# Patient Record
Sex: MALE | Race: WHITE | NOT HISPANIC OR LATINO | Employment: OTHER | URBAN - METROPOLITAN AREA
[De-identification: names, ages, dates, MRNs, and addresses within clinical notes are randomized per-mention and may not be internally consistent; named-entity substitution may affect disease eponyms.]

---

## 2017-02-14 DIAGNOSIS — E11.9 TYPE 2 DIABETES MELLITUS WITHOUT COMPLICATION, WITHOUT LONG-TERM CURRENT USE OF INSULIN (HCC): ICD-10-CM

## 2017-02-14 RX ORDER — LANCETS
EACH MISCELLANEOUS
Qty: 100 EACH | Refills: 4 | Status: SHIPPED | OUTPATIENT
Start: 2017-02-14 | End: 2018-03-24 | Stop reason: SDUPTHER

## 2017-04-03 ENCOUNTER — RESULTS ENCOUNTER (OUTPATIENT)
Dept: FAMILY MEDICINE CLINIC | Facility: CLINIC | Age: 77
End: 2017-04-03

## 2017-04-03 DIAGNOSIS — E11.9 TYPE 2 DIABETES MELLITUS WITHOUT COMPLICATION, WITHOUT LONG-TERM CURRENT USE OF INSULIN (HCC): ICD-10-CM

## 2017-04-12 LAB
ALBUMIN SERPL-MCNC: 4.3 G/DL (ref 3.5–5.2)
ALBUMIN/GLOB SERPL: 1.3 G/DL
ALP SERPL-CCNC: 137 U/L (ref 39–117)
ALT SERPL-CCNC: 24 U/L (ref 1–41)
AST SERPL-CCNC: 26 U/L (ref 1–40)
BILIRUB SERPL-MCNC: 0.5 MG/DL (ref 0.1–1.2)
BUN SERPL-MCNC: 20 MG/DL (ref 8–23)
BUN/CREAT SERPL: 19.4 (ref 7–25)
CALCIUM SERPL-MCNC: 9.7 MG/DL (ref 8.6–10.5)
CHLORIDE SERPL-SCNC: 103 MMOL/L (ref 98–107)
CHOLEST SERPL-MCNC: 191 MG/DL (ref 0–200)
CO2 SERPL-SCNC: 27.2 MMOL/L (ref 22–29)
CREAT SERPL-MCNC: 1.03 MG/DL (ref 0.76–1.27)
ERYTHROCYTE [DISTWIDTH] IN BLOOD BY AUTOMATED COUNT: 14 % (ref 11.5–14.5)
GLOBULIN SER CALC-MCNC: 3.4 GM/DL
GLUCOSE SERPL-MCNC: 121 MG/DL (ref 65–99)
HBA1C MFR BLD: 7.63 % (ref 4.8–5.6)
HCT VFR BLD AUTO: 48.4 % (ref 40.4–52.2)
HDLC SERPL-MCNC: 44 MG/DL (ref 40–60)
HGB BLD-MCNC: 15.9 G/DL (ref 13.7–17.6)
LDLC SERPL CALC-MCNC: 129 MG/DL (ref 0–100)
MCH RBC QN AUTO: 29.9 PG (ref 27–32.7)
MCHC RBC AUTO-ENTMCNC: 32.9 G/DL (ref 32.6–36.4)
MCV RBC AUTO: 91.1 FL (ref 79.8–96.2)
PLATELET # BLD AUTO: 204 10*3/MM3 (ref 140–500)
POTASSIUM SERPL-SCNC: 4.4 MMOL/L (ref 3.5–5.2)
PROT SERPL-MCNC: 7.7 G/DL (ref 6–8.5)
RBC # BLD AUTO: 5.31 10*6/MM3 (ref 4.6–6)
SODIUM SERPL-SCNC: 143 MMOL/L (ref 136–145)
TRIGL SERPL-MCNC: 91 MG/DL (ref 0–150)
VLDLC SERPL CALC-MCNC: 18.2 MG/DL (ref 5–40)
WBC # BLD AUTO: 6.4 10*3/MM3 (ref 4.5–10.7)

## 2017-04-19 ENCOUNTER — OFFICE VISIT (OUTPATIENT)
Dept: FAMILY MEDICINE CLINIC | Facility: CLINIC | Age: 77
End: 2017-04-19

## 2017-04-19 VITALS
SYSTOLIC BLOOD PRESSURE: 132 MMHG | HEART RATE: 63 BPM | OXYGEN SATURATION: 95 % | HEIGHT: 70 IN | TEMPERATURE: 97.9 F | WEIGHT: 173.3 LBS | DIASTOLIC BLOOD PRESSURE: 78 MMHG | BODY MASS INDEX: 24.81 KG/M2

## 2017-04-19 DIAGNOSIS — L57.0 ACTINIC KERATOSIS: ICD-10-CM

## 2017-04-19 DIAGNOSIS — E78.5 DYSLIPIDEMIA: ICD-10-CM

## 2017-04-19 DIAGNOSIS — Z79.4 TYPE 2 DIABETES MELLITUS WITHOUT COMPLICATION, WITH LONG-TERM CURRENT USE OF INSULIN (HCC): Primary | ICD-10-CM

## 2017-04-19 DIAGNOSIS — E11.9 TYPE 2 DIABETES MELLITUS WITHOUT COMPLICATION, WITH LONG-TERM CURRENT USE OF INSULIN (HCC): Primary | ICD-10-CM

## 2017-04-19 PROCEDURE — 99214 OFFICE O/P EST MOD 30 MIN: CPT | Performed by: INTERNAL MEDICINE

## 2017-04-19 NOTE — PROGRESS NOTES
Chief Complaint   Patient presents with   • Diabetes     4 month f/u   History of Present Illness   His diabetes developed fairly quickly following pancreatitis in the past. Initially, his sugars were greater than 200. When he presented to me in March, 2014, his sugar was 377. We started multiple oral medications, and his sugars were trending down. He developed acute and severe nausea and vomiting, and was eventually hospitalized with a gastric outlet obstruction.      He underwent partial gastrectomy (and cholecystectomy) in May, 2014. When he was discharged, we kept him on Lantus because of his history of pancreatitis. All of his oral medications were discontinued.      His A1c was up to 8.7% in April, so he changed his diet but did not change his morning Lantus dose.; he still uses 22 Units every night. His A1c dropped to 7.0% by August. He checks his sugars every morning, but not in the evenings. Readings are usually in the 120-130s (no log provided).     He saw Dr. Ramila Stanley for his eye exam lsast year. There was no evidence of retinopathy.     Mr. Greene  reports that he has never smoked. He has never used smokeless tobacco. He reports that he does not drink alcohol or use illicit drugs.        Current Outpatient Prescriptions:   •  aspirin 81 MG chewable tablet, Chew 81 mg daily., Disp: , Rfl:   •  JUVENTINO CONTOUR TEST test strip, USE TO TEST BLOOD SUGAR 3 TIMES A DAY for uncontrolled type 2 diabetes (E11.65)., Disp: 100 each, Rfl: 12  •  BD PEN NEEDLE DELISA U/F 32G X 4 MM misc, USE 1 WITH LANTUS EVERY DAY, Disp: 100 each, Rfl: 4  •  Insulin Degludec 200 UNIT/ML solution pen-injector, Inject 22 Units under the skin Daily., Disp: 1 pen, Rfl: 3  •  MICROLET LANCETS misc, USE 1 TIME A DAY, Disp: 100 each, Rfl: 4  •  omeprazole (PriLOSEC) 20 MG capsule, Take 20 mg by mouth daily., Disp: , Rfl:     ROS: No CP, claudication. No TIA/CVA symptoms. No paresthesias in the hands and feet.  No polyuria/polydipsia.  Sore,  "scaling site over the left eye.    /78 (BP Location: Left arm, Patient Position: Sitting, Cuff Size: Adult)  Pulse 63  Temp 97.9 °F (36.6 °C) (Oral)   Ht 70\" (177.8 cm)  Wt 173 lb 4.8 oz (78.6 kg)  SpO2 95%  BMI 24.87 kg/m2    EXAM  Constitutional: He is oriented to person, place, and time. He appears well-developed and well-nourished.   Eyes: Conjunctivae are normal. No scleral icterus.   Neck: Carotid bruit is not present. No thyroid mass and no thyromegaly present.   Cardiovascular: Normal rate, regular rhythm, normal heart sounds and intact distal pulses.   No pedal edema.   Abdominal: Soft. Bowel sounds are normal. He exhibits no abdominal bruit.   Neurological: He is alert and oriented to person, place, and time. He has normal strength.    Monofilament normal bilaterally.  No pedal lesion and pulses are full bilaterally.  Scaling, erythematous macule over the left eye, neat the eyebrow, c/w and AK.    Bennie was seen today for diabetes.    Diagnoses and all orders for this visit:    Type 2 diabetes mellitus without complication, with long-term current use of insulin    Dyslipidemia    Actinic keratosis  -     Ambulatory Referral to Dermatology    His A1c is up to 7.6 and his LDL is up to 126.  Instead of starting or changing or adding any new medications, he will look at his diet objectively, make some changes, and come back in 3 months so that we can see what his A1c is down to.  We will recheck his cholesterol in the fall.    "

## 2017-07-19 ENCOUNTER — OFFICE VISIT (OUTPATIENT)
Dept: FAMILY MEDICINE CLINIC | Facility: CLINIC | Age: 77
End: 2017-07-19

## 2017-07-19 VITALS — SYSTOLIC BLOOD PRESSURE: 122 MMHG | DIASTOLIC BLOOD PRESSURE: 68 MMHG | HEART RATE: 60 BPM

## 2017-07-19 DIAGNOSIS — Z79.4 TYPE 2 DIABETES MELLITUS WITHOUT COMPLICATION, WITH LONG-TERM CURRENT USE OF INSULIN (HCC): Primary | ICD-10-CM

## 2017-07-19 DIAGNOSIS — E11.9 TYPE 2 DIABETES MELLITUS WITHOUT COMPLICATION, WITH LONG-TERM CURRENT USE OF INSULIN (HCC): Primary | ICD-10-CM

## 2017-07-19 LAB
EXPIRATION DATE: ABNORMAL
HBA1C MFR BLD: 6.7 % (ref 4.8–5.6)
Lab: ABNORMAL

## 2017-07-19 PROCEDURE — 36416 COLLJ CAPILLARY BLOOD SPEC: CPT | Performed by: INTERNAL MEDICINE

## 2017-07-19 PROCEDURE — 99213 OFFICE O/P EST LOW 20 MIN: CPT | Performed by: INTERNAL MEDICINE

## 2017-07-19 PROCEDURE — 83036 HEMOGLOBIN GLYCOSYLATED A1C: CPT | Performed by: INTERNAL MEDICINE

## 2017-07-19 NOTE — PROGRESS NOTES
Chief Complaint   Patient presents with   • Diabetes     Chronic, now insulin-dependent, since a bout of pancreatitis years ago.    A1c was up to 7.63% in April. He has since changed his diet, decreasing his carb intake in the evenings.  He has noticed the difference in his readings.  He denies any hypoglycemic episodes.    No Known Allergies      Current Outpatient Prescriptions:   •  aspirin 81 MG chewable tablet, Chew 81 mg daily., Disp: , Rfl:   •  JUVENTINO CONTOUR TEST test strip, USE TO TEST BLOOD SUGAR 3 TIMES A DAY for uncontrolled type 2 diabetes (E11.65)., Disp: 100 each, Rfl: 12  •  BD PEN NEEDLE DELISA U/F 32G X 4 MM misc, USE 1 WITH LANTUS EVERY DAY, Disp: 100 each, Rfl: 4  •  Insulin Degludec 200 UNIT/ML solution pen-injector, Inject 22 Units under the skin Daily., Disp: 1 pen, Rfl: 3  •  MICROLET LANCETS misc, USE 1 TIME A DAY, Disp: 100 each, Rfl: 4  •  omeprazole (PriLOSEC) 20 MG capsule, Take 20 mg by mouth daily., Disp: , Rfl:     Sister recently  from breast cancer.    He  reports that he has never smoked. He has never used smokeless tobacco. He reports that he does not drink alcohol or use illicit drugs.    ROS: No vision changes. No chest pain. No claudication.  No paresthesias. No angina.      /68 (BP Location: Left arm, Patient Position: Sitting, Cuff Size: Adult)  Pulse 60    EXAM  Well-developed, well-nourished, in no acute distress.  No periorbital edema.  Regular rate and rhythm without murmur.  Normal S1 and S2.  No pedal edema and pedal pulses are full bilaterally.    Bennie was seen today for diabetes.    Diagnoses and all orders for this visit:    Type 2 diabetes mellitus without complication, with long-term current use of insulin  -     Hemoglobin A1c    A1c has improved and is now 6.7%.  He should continue to monitor her sugars, but I have asked him to do.  Testing around the time of dinner at least 1 night a week.  We may wish to move his Lantus to nighttime to improve  control as well.  He should continue with the diet changes that have his A1c down from 7.3.

## 2017-10-03 DIAGNOSIS — Z12.5 SPECIAL SCREENING FOR MALIGNANT NEOPLASM OF PROSTATE: Primary | ICD-10-CM

## 2017-10-03 DIAGNOSIS — Z00.00 ROUTINE GENERAL MEDICAL EXAMINATION AT HEALTH CARE FACILITY: ICD-10-CM

## 2017-10-08 ENCOUNTER — RESULTS ENCOUNTER (OUTPATIENT)
Dept: FAMILY MEDICINE CLINIC | Facility: CLINIC | Age: 77
End: 2017-10-08

## 2017-10-08 DIAGNOSIS — Z00.00 ROUTINE GENERAL MEDICAL EXAMINATION AT HEALTH CARE FACILITY: ICD-10-CM

## 2017-10-17 RX ORDER — PEN NEEDLE, DIABETIC 32GX 5/32"
NEEDLE, DISPOSABLE MISCELLANEOUS
Qty: 100 EACH | Refills: 3 | Status: SHIPPED | OUTPATIENT
Start: 2017-10-17 | End: 2018-09-21 | Stop reason: SDUPTHER

## 2017-10-18 ENCOUNTER — OFFICE VISIT (OUTPATIENT)
Dept: FAMILY MEDICINE CLINIC | Facility: CLINIC | Age: 77
End: 2017-10-18

## 2017-10-18 VITALS
SYSTOLIC BLOOD PRESSURE: 128 MMHG | OXYGEN SATURATION: 98 % | DIASTOLIC BLOOD PRESSURE: 76 MMHG | HEART RATE: 64 BPM | WEIGHT: 173.7 LBS | BODY MASS INDEX: 24.87 KG/M2 | HEIGHT: 70 IN

## 2017-10-18 DIAGNOSIS — Z85.46 PERSONAL HISTORY OF MALIGNANT NEOPLASM OF PROSTATE: ICD-10-CM

## 2017-10-18 DIAGNOSIS — Z79.4 TYPE 2 DIABETES MELLITUS WITHOUT COMPLICATION, WITH LONG-TERM CURRENT USE OF INSULIN (HCC): ICD-10-CM

## 2017-10-18 DIAGNOSIS — Z23 FLU VACCINE NEED: ICD-10-CM

## 2017-10-18 DIAGNOSIS — Z00.00 ROUTINE GENERAL MEDICAL EXAMINATION AT HEALTH CARE FACILITY: Primary | ICD-10-CM

## 2017-10-18 DIAGNOSIS — E11.9 TYPE 2 DIABETES MELLITUS WITHOUT COMPLICATION, WITH LONG-TERM CURRENT USE OF INSULIN (HCC): ICD-10-CM

## 2017-10-18 PROCEDURE — 90662 IIV NO PRSV INCREASED AG IM: CPT | Performed by: INTERNAL MEDICINE

## 2017-10-18 PROCEDURE — G0439 PPPS, SUBSEQ VISIT: HCPCS | Performed by: INTERNAL MEDICINE

## 2017-10-18 PROCEDURE — G0008 ADMIN INFLUENZA VIRUS VAC: HCPCS | Performed by: INTERNAL MEDICINE

## 2017-10-18 NOTE — PATIENT INSTRUCTIONS
Medicare Wellness  Personal Prevention Plan of Service     Date of Office Visit:  10/18/2017  Encounter Provider:  Olvin Correa MD  Place of Service:  [facility] INTERNAL MEDICINE  Patient Name: Bennie Greene  :  1940    As part of the Medicare Wellness portion of your visit today, we are providing you with this personalized preventive plan of services (PPPS). This plan is based upon recommendations of the [place] Preventive Services Task Force (USPSTF) and the Advisory Committee on Immunization Practices (ACIP).    This lists the preventive care services that should be considered, and provides dates of when you are due. Items listed as completed are up-to-date and do not require any further intervention.    Health Maintenance   Topic Date Due   • TDAP/TD VACCINES (1 - Tdap) ?   • PNEUMOCOCCAL VACCINES (65+ LOW/MEDIUM RISK) (1 of 2 - PCV13) ?   • DIABETIC FOOT EXAM  2018   • COLONOSCOPY  10/2024 ?   • ZOSTER VACCINE  ?   • DIABETIC EYE EXAM  Yearly by Dr. Stanley   • URINE MICROALBUMIN  2018   • INFLUENZA VACCINE  2017   • HEMOGLOBIN A1C  2018       Orders Placed This Encounter   Procedures   • Flu Vaccine High Dose PF 65YR+   • Hemoglobin A1c   • Comprehensive Metabolic Panel   • PSA   • Microalbumin / Creatinine Urine Ratio - Urine, Clean Catch       Return in about 3 months (around 2018) for 20 min.    Please have the Health Dept fax a record of your immunizations to our office.

## 2017-10-18 NOTE — PROGRESS NOTES
QUICK REFERENCE INFORMATION:  The ABCs of the Annual Wellness Visit    Subsequent Medicare Wellness Visit    HEALTH RISK ASSESSMENT    1940    Recent Hospitalizations:  No hospitalization(s) within the last year..        Current Medical Providers:  Patient Care Team:  Olvin Correa MD as PCP - General  Olvin Correa MD as PCP - Claims Attributed  Ramila Stanlye MD as Consulting Physician (Ophthalmology)  Shila Killian MD as Surgeon (General Surgery)  Mata Wright MD as Consulting Physician (Dermatology)        Smoking Status:  History   Smoking Status   • Never Smoker   Smokeless Tobacco   • Never Used       Alcohol Consumption:  History   Alcohol Use No       Depression Screen:   PHQ-2/PHQ-9 Depression Screening 10/18/2017   Little interest or pleasure in doing things 0   Feeling down, depressed, or hopeless 0   Total Score 0       Health Habits and Functional and Cognitive Screening:  Functional & Cognitive Status 10/18/2017   Do you have difficulty preparing food and eating? No   Do you have difficulty bathing yourself? No   Do you have difficulty getting dressed? No   Do you have difficulty using the toilet? No   Do you have difficulty moving around from place to place? No   In the past year have you fallen or experienced a near fall? No   Do you need help using the phone?  No   Are you deaf or do you have serious difficulty hearing?  No   Do you need help with transportation? No   Do you need help shopping? No   Do you need help preparing meals?  No   Do you need help with housework?  No   Do you need help with laundry? No   Do you need help taking your medications? No   Do you need help managing money? No       Health Habits  Current Diet: Well Balanced Diet  Dental Exam: Up to date  Eye Exam: Up to date  Exercise (times per week): 4 times per week  Current Exercise Activities Include: Walking      Does the patient have evidence of cognitive impairment? No    Aspirin use  counseling: Taking ASA appropriately as indicated      Recent Lab Results:  CMP:  Lab Results   Component Value Date     (H) 04/12/2017    BUN 20 04/12/2017    CREATININE 1.03 04/12/2017    EGFRIFNONA 70 04/12/2017    EGFRIFAFRI 85 04/12/2017    BCR 19.4 04/12/2017     04/12/2017    K 4.4 04/12/2017    CO2 27.2 04/12/2017    CALCIUM 9.7 04/12/2017    PROTENTOTREF 7.7 04/12/2017    ALBUMIN 4.30 04/12/2017    LABGLOBREF 3.4 04/12/2017    LABIL2 1.3 04/12/2017    BILITOT 0.5 04/12/2017    ALKPHOS 137 (H) 04/12/2017    AST 26 04/12/2017    ALT 24 04/12/2017     Lipid Panel:  Lab Results   Component Value Date    TRIG 91 04/12/2017    HDL 44 04/12/2017    VLDL 18.2 04/12/2017     HbA1c:  Lab Results   Component Value Date    HGBA1C 6.7 (A) 07/19/2017       Visual Acuity:  No exam data present    Age-appropriate Screening Schedule:  Refer to the list below for future screening recommendations based on patient's age, sex and/or medical conditions. Orders for these recommended tests are listed in the plan section. The patient has been provided with a written plan.    Health Maintenance   Topic Date Due   • TDAP/TD VACCINES (1 - Tdap) ?   • PNEUMOCOCCAL VACCINES (65+ LOW/MEDIUM RISK) (1 of 2 - PCV13) ?   • DIABETIC FOOT EXAM  04/06/2018   • COLONOSCOPY  10/2024 ?   • ZOSTER VACCINE  ?   • DIABETIC EYE EXAM  Yearly by Dr. Stanley   • URINE MICROALBUMIN  4/2018   • INFLUENZA VACCINE  08/01/2017   • HEMOGLOBIN A1C  01/19/2018        Subjective   History of Present Illness    Bennie Greene is a 77 y.o. male who presents for an Subsequent Wellness Visit.    The following portions of the patient's history were reviewed and updated as appropriate: allergies, current medications, past family history, past medical history, past social history, past surgical history and problem list.    Outpatient Medications Prior to Visit   Medication Sig Dispense Refill   • aspirin 81 MG chewable tablet Chew 81 mg daily.     • JUVENTINO  "CONTOUR TEST test strip USE TO TEST BLOOD SUGAR 3 TIMES A DAY for uncontrolled type 2 diabetes (E11.65). 100 each 12   • BD PEN NEEDLE DELISA U/F 32G X 4 MM misc USE 1 WITH LANTUS EVERY  each 3   • Insulin Degludec 200 UNIT/ML solution pen-injector Inject 22 Units under the skin Daily. 1 pen 3   • MICROLET LANCETS misc USE 1 TIME A  each 4   • omeprazole (PriLOSEC) 20 MG capsule Take 20 mg by mouth daily.       No facility-administered medications prior to visit.        Patient Active Problem List   Diagnosis   • Type 2 diabetes mellitus without complication       Advance Care Planning:  has NO advance directive - information provided to the patient today    Identification of Risk Factors:  Risk factors include: unhealthy diet and cardiovascular risk.    Review of Systems   Constitutional: Negative for unexpected weight change.   Respiratory: Negative for shortness of breath.    Cardiovascular: Negative for chest pain.   Musculoskeletal: Negative for gait problem.   Neurological: Negative for weakness.       Compared to one year ago, the patient feels his physical health is the same.  Compared to one year ago, the patient feels his mental health is the same.    Objective     Physical Exam  Well-developed, well-nourished, in no acute distress.  Regular rate and rhythm with a 1/6 early systolic murmur.  Mood is upbeat and affect is appropriate.  Alert and oriented ×4 and answers all questions appropriately.  Able to ascend and descend from the exam table fluidly and without assistance.  Station, gait, and coordination are normal.    Vitals:    10/18/17 0851   BP: 128/76   BP Location: Left arm   Patient Position: Sitting   Cuff Size: Adult   Pulse: 64   SpO2: 98%   Weight: 173 lb 11.2 oz (78.8 kg)   Height: 70\" (177.8 cm)       Body mass index is 24.92 kg/(m^2).  Discussed the patient's BMI with him. The BMI is in the acceptable range.    Assessment/Plan   Patient Self-Management and Personalized Health " Advice  The patient has been provided with information about: prevention of cardiac or vascular disease and preventive services including:   · Advance directive, Influenza vaccine, Pneumococcal vaccine , Prostate cancer screening discussed, Zostavax vaccine (Herpes Zoster).    Visit Diagnoses:    ICD-10-CM ICD-9-CM   1. Routine general medical examination at health care facility Z00.00 V70.0   2. Flu vaccine need Z23 V04.81       Orders Placed This Encounter   Procedures   • Flu Vaccine High Dose PF 65YR+       Outpatient Encounter Prescriptions as of 10/18/2017   Medication Sig Dispense Refill   • aspirin 81 MG chewable tablet Chew 81 mg daily.     • JUVENTINO CONTOUR TEST test strip USE TO TEST BLOOD SUGAR 3 TIMES A DAY for uncontrolled type 2 diabetes (E11.65). 100 each 12   • BD PEN NEEDLE DELISA U/F 32G X 4 MM misc USE 1 WITH LANTUS EVERY  each 3   • Insulin Degludec 200 UNIT/ML solution pen-injector Inject 22 Units under the skin Daily. 1 pen 3   • MICROLET LANCETS misc USE 1 TIME A  each 4   • omeprazole (PriLOSEC) 20 MG capsule Take 20 mg by mouth daily.       No facility-administered encounter medications on file as of 10/18/2017.        Reviewed use of high risk medication in the elderly: yes  Reviewed for potential of harmful drug interactions in the elderly: yes    Follow Up:  No Follow-up on file.     An After Visit Summary and PPPS with all of these plans were given to the patient.       We discussed age-appropriate health maintenance issues today.  He will have the health Department forward a copy of his immunizations to us.  However, he denies having had pneumonia vaccines or shingles vaccine.  If this is the case, we will begin immunizing him at his next office visit.

## 2017-10-19 LAB
ALBUMIN SERPL-MCNC: 4.3 G/DL (ref 3.5–5.2)
ALBUMIN/CREAT UR: ABNORMAL MG/G CREAT (ref 0–30)
ALBUMIN/GLOB SERPL: 1.7 G/DL
ALP SERPL-CCNC: 122 U/L (ref 39–117)
ALT SERPL-CCNC: 21 U/L (ref 1–41)
AST SERPL-CCNC: 19 U/L (ref 1–40)
BILIRUB SERPL-MCNC: 0.4 MG/DL (ref 0.1–1.2)
BUN SERPL-MCNC: 23 MG/DL (ref 8–23)
BUN/CREAT SERPL: 17.6 (ref 7–25)
CALCIUM SERPL-MCNC: 9.5 MG/DL (ref 8.6–10.5)
CHLORIDE SERPL-SCNC: 104 MMOL/L (ref 98–107)
CO2 SERPL-SCNC: 29.2 MMOL/L (ref 22–29)
CREAT SERPL-MCNC: 1.31 MG/DL (ref 0.76–1.27)
CREAT UR-MCNC: 142.5 MG/DL
GFR SERPLBLD CREATININE-BSD FMLA CKD-EPI: 53 ML/MIN/1.73
GFR SERPLBLD CREATININE-BSD FMLA CKD-EPI: 64 ML/MIN/1.73
GLOBULIN SER CALC-MCNC: 2.6 GM/DL
GLUCOSE SERPL-MCNC: 164 MG/DL (ref 65–99)
HBA1C MFR BLD: 6.98 % (ref 4.8–5.6)
MICROALBUMIN UR-MCNC: <3 UG/ML
POTASSIUM SERPL-SCNC: 4.2 MMOL/L (ref 3.5–5.2)
PROT SERPL-MCNC: 6.9 G/DL (ref 6–8.5)
PSA SERPL-MCNC: 0.12 NG/ML (ref 0–4)
SODIUM SERPL-SCNC: 144 MMOL/L (ref 136–145)

## 2017-12-27 DIAGNOSIS — E11.9 TYPE 2 DIABETES MELLITUS WITHOUT COMPLICATION, WITHOUT LONG-TERM CURRENT USE OF INSULIN (HCC): ICD-10-CM

## 2017-12-27 RX ORDER — INSULIN DEGLUDEC 200 U/ML
INJECTION, SOLUTION SUBCUTANEOUS
Qty: 2 PEN | Refills: 0 | Status: SHIPPED | OUTPATIENT
Start: 2017-12-27 | End: 2018-01-24 | Stop reason: SDUPTHER

## 2018-01-11 ENCOUNTER — OFFICE VISIT (OUTPATIENT)
Dept: FAMILY MEDICINE CLINIC | Facility: CLINIC | Age: 78
End: 2018-01-11

## 2018-01-11 VITALS
WEIGHT: 176.8 LBS | SYSTOLIC BLOOD PRESSURE: 104 MMHG | DIASTOLIC BLOOD PRESSURE: 80 MMHG | HEART RATE: 64 BPM | HEIGHT: 70 IN | OXYGEN SATURATION: 96 % | BODY MASS INDEX: 25.31 KG/M2

## 2018-01-11 DIAGNOSIS — Z79.4 TYPE 2 DIABETES MELLITUS WITHOUT COMPLICATION, WITH LONG-TERM CURRENT USE OF INSULIN (HCC): Primary | ICD-10-CM

## 2018-01-11 DIAGNOSIS — E11.9 TYPE 2 DIABETES MELLITUS WITHOUT COMPLICATION, WITH LONG-TERM CURRENT USE OF INSULIN (HCC): Primary | ICD-10-CM

## 2018-01-11 LAB
EXPIRATION DATE: ABNORMAL
HBA1C MFR BLD: 7.4 % (ref 4.8–5.6)
Lab: ABNORMAL

## 2018-01-11 PROCEDURE — 36416 COLLJ CAPILLARY BLOOD SPEC: CPT | Performed by: INTERNAL MEDICINE

## 2018-01-11 PROCEDURE — 83036 HEMOGLOBIN GLYCOSYLATED A1C: CPT | Performed by: INTERNAL MEDICINE

## 2018-01-11 PROCEDURE — 99213 OFFICE O/P EST LOW 20 MIN: CPT | Performed by: INTERNAL MEDICINE

## 2018-01-11 NOTE — PROGRESS NOTES
"Chief Complaint   Patient presents with   • Diabetes     3 month f/u     Chronic, now insulin-dependent DM, since a bout of severe pancreatitis years ago.  He has been on      A1c was up to 7.63% in April, 2017, but his readings were below 7% the rest of the year.  He checks his sugars most mornings and sees readings in the 110-135 range.  He denies any hypoglycemic episodes.    No Known Allergies      Current Outpatient Prescriptions:   •  aspirin 81 MG chewable tablet, Chew 81 mg daily., Disp: , Rfl:   •  JUVENTINO CONTOUR TEST test strip, USE TO TEST BLOOD SUGAR 3 TIMES A DAY for uncontrolled type 2 diabetes (E11.65)., Disp: 100 each, Rfl: 12  •  BD PEN NEEDLE DELISA U/F 32G X 4 MM misc, USE 1 WITH LANTUS EVERY DAY, Disp: 100 each, Rfl: 3  •  MICROLET LANCETS misc, USE 1 TIME A DAY, Disp: 100 each, Rfl: 4  •  omeprazole (PriLOSEC) 20 MG capsule, Take 20 mg by mouth daily., Disp: , Rfl:   •  TRESIBA FLEXTOUCH 200 UNIT/ML solution pen-injector, INJECT 22 UNITS UNDER THE SKIN DAILY., Disp: 2 pen, Rfl: 0      He  reports that he has never smoked. He has never used smokeless tobacco. He reports that he does not drink alcohol or use illicit drugs.      ROS:  No vision changes.  No polyuria.  No numbness or tingling.    /80 (BP Location: Left arm, Patient Position: Sitting, Cuff Size: Adult)  Pulse 64  Ht 177.8 cm (70\")  Wt 80.2 kg (176 lb 12.8 oz)  SpO2 96%  BMI 25.37 kg/m2      EXAM:  In NAD.  No carotid bruit.  RRR, no murmur.  No LE edema.      Bennie was seen today for diabetes.    Diagnoses and all orders for this visit:    Type 2 diabetes mellitus without complication, with long-term current use of insulin  -     POC Glycated Hemoglobin is 7.4%  -     Ambulatory Referral to Ophthalmology  Diet changes first, but we may try adding an SGLT-2 inhibitor for better control.   RTO in 3-4 months w/ labs prior.      "

## 2018-01-24 DIAGNOSIS — E11.9 TYPE 2 DIABETES MELLITUS WITHOUT COMPLICATION, WITHOUT LONG-TERM CURRENT USE OF INSULIN (HCC): ICD-10-CM

## 2018-01-25 RX ORDER — INSULIN DEGLUDEC 200 U/ML
INJECTION, SOLUTION SUBCUTANEOUS
Qty: 10 PEN | Refills: 1 | Status: SHIPPED | OUTPATIENT
Start: 2018-01-25 | End: 2018-04-25 | Stop reason: SDUPTHER

## 2018-03-24 DIAGNOSIS — E11.9 TYPE 2 DIABETES MELLITUS WITHOUT COMPLICATION, WITHOUT LONG-TERM CURRENT USE OF INSULIN (HCC): ICD-10-CM

## 2018-04-08 ENCOUNTER — RESULTS ENCOUNTER (OUTPATIENT)
Dept: FAMILY MEDICINE CLINIC | Facility: CLINIC | Age: 78
End: 2018-04-08

## 2018-04-08 DIAGNOSIS — Z79.4 TYPE 2 DIABETES MELLITUS WITHOUT COMPLICATION, WITH LONG-TERM CURRENT USE OF INSULIN (HCC): ICD-10-CM

## 2018-04-08 DIAGNOSIS — E11.9 TYPE 2 DIABETES MELLITUS WITHOUT COMPLICATION, WITH LONG-TERM CURRENT USE OF INSULIN (HCC): ICD-10-CM

## 2018-04-25 ENCOUNTER — OFFICE VISIT (OUTPATIENT)
Dept: FAMILY MEDICINE CLINIC | Facility: CLINIC | Age: 78
End: 2018-04-25

## 2018-04-25 VITALS
BODY MASS INDEX: 25.53 KG/M2 | SYSTOLIC BLOOD PRESSURE: 132 MMHG | HEIGHT: 70 IN | WEIGHT: 178.3 LBS | DIASTOLIC BLOOD PRESSURE: 82 MMHG | OXYGEN SATURATION: 93 % | HEART RATE: 61 BPM

## 2018-04-25 DIAGNOSIS — Z79.4 TYPE 2 DIABETES MELLITUS WITH HYPERGLYCEMIA, WITH LONG-TERM CURRENT USE OF INSULIN (HCC): Primary | ICD-10-CM

## 2018-04-25 DIAGNOSIS — E11.22 TYPE 2 DIABETES MELLITUS WITH STAGE 3 CHRONIC KIDNEY DISEASE, WITH LONG-TERM CURRENT USE OF INSULIN (HCC): ICD-10-CM

## 2018-04-25 DIAGNOSIS — Z79.4 TYPE 2 DIABETES MELLITUS WITH STAGE 3 CHRONIC KIDNEY DISEASE, WITH LONG-TERM CURRENT USE OF INSULIN (HCC): ICD-10-CM

## 2018-04-25 DIAGNOSIS — E11.65 TYPE 2 DIABETES MELLITUS WITH HYPERGLYCEMIA, WITH LONG-TERM CURRENT USE OF INSULIN (HCC): Primary | ICD-10-CM

## 2018-04-25 DIAGNOSIS — N18.30 TYPE 2 DIABETES MELLITUS WITH STAGE 3 CHRONIC KIDNEY DISEASE, WITH LONG-TERM CURRENT USE OF INSULIN (HCC): ICD-10-CM

## 2018-04-25 LAB
EXPIRATION DATE: ABNORMAL
HBA1C MFR BLD: 7.6 % (ref 4.8–5.6)
Lab: ABNORMAL

## 2018-04-25 PROCEDURE — 83036 HEMOGLOBIN GLYCOSYLATED A1C: CPT | Performed by: INTERNAL MEDICINE

## 2018-04-25 PROCEDURE — 36416 COLLJ CAPILLARY BLOOD SPEC: CPT | Performed by: INTERNAL MEDICINE

## 2018-04-25 PROCEDURE — 99213 OFFICE O/P EST LOW 20 MIN: CPT | Performed by: INTERNAL MEDICINE

## 2018-04-25 NOTE — PROGRESS NOTES
"Diabetes (3 month f/u)    Chronic, now insulin-dependent DM, since a bout of severe pancreatitis years ago.  He has been on insulin since that hospitalization.     A1c was up to 7.63% in April, 2017, his readings were below 7% the rest of the year, and then chantelle to 7.4% at our office visit 1/11/18.  He denied any further medications.  He has embarked on additional diet changes and has increased his activity this spring. He checks his sugars most mornings and sees readings in the  range. He denies any hypoglycemic episodes.     No Known Allergies       Current Outpatient Prescriptions:   •  aspirin 81 MG chewable tablet, Chew 81 mg daily., Disp: , Rfl:   •  JUVENTINO CONTOUR TEST test strip, USE TO TEST BLOOD SUGAR 3 TIMES A DAY for uncontrolled type 2 diabetes (E11.65)., Disp: 100 each, Rfl: 12  •  JUVENTINO MICROLET LANCETS lancets, USE 1 TIME A DAY, Disp: 100 each, Rfl: 3  •  BD PEN NEEDLE DELISA U/F 32G X 4 MM misc, USE 1 WITH LANTUS EVERY DAY, Disp: 100 each, Rfl: 3  •  omeprazole (PriLOSEC) 20 MG capsule, Take 20 mg by mouth daily., Disp: , Rfl:   •  TRESIBA FLEXTOUCH 200 UNIT/ML solution pen-injector, INJECT 22 UNITS UNDER THE SKIN DAILY., Disp: 10 pen, Rfl: 1    ROS:   No vision changes.  No syncope or near-syncope.  No angina.  No paresthesias in the extremities.    /82 (BP Location: Left arm, Patient Position: Sitting, Cuff Size: Adult)   Pulse 61   Ht 177.8 cm (70\")   Wt 80.9 kg (178 lb 4.8 oz)   SpO2 93%   BMI 25.58 kg/m²      Well-developed, well-nourished, in good spirits.  Sclerae are anicteric.    Conjunctivae are pink.  No periorbital edema.  RRR; no murmur.  No edema of the lower extremities.      Bennie was seen today for diabetes.    Diagnoses and all orders for this visit:    Type 2 diabetes mellitus with hyperglycemia, with long-term current use of insulin  -     POC Glycated Hemoglobin remains elevated at 7.6%.  We will have him increase the insulin to 26 units and recheck his A1c in 3 " months.  He will call if he develops hypoglycemia (readings below 80) more than 3 times in any one week.

## 2018-06-04 DIAGNOSIS — Z85.46 PERSONAL HISTORY OF MALIGNANT NEOPLASM OF PROSTATE: Primary | ICD-10-CM

## 2018-06-04 DIAGNOSIS — N18.30 TYPE 2 DIABETES MELLITUS WITH STAGE 3 CHRONIC KIDNEY DISEASE, WITH LONG-TERM CURRENT USE OF INSULIN (HCC): ICD-10-CM

## 2018-06-04 DIAGNOSIS — Z79.4 TYPE 2 DIABETES MELLITUS WITH STAGE 3 CHRONIC KIDNEY DISEASE, WITH LONG-TERM CURRENT USE OF INSULIN (HCC): ICD-10-CM

## 2018-06-04 DIAGNOSIS — E11.22 TYPE 2 DIABETES MELLITUS WITH STAGE 3 CHRONIC KIDNEY DISEASE, WITH LONG-TERM CURRENT USE OF INSULIN (HCC): ICD-10-CM

## 2018-07-22 ENCOUNTER — RESULTS ENCOUNTER (OUTPATIENT)
Dept: FAMILY MEDICINE CLINIC | Facility: CLINIC | Age: 78
End: 2018-07-22

## 2018-07-22 DIAGNOSIS — E11.22 TYPE 2 DIABETES MELLITUS WITH STAGE 3 CHRONIC KIDNEY DISEASE, WITH LONG-TERM CURRENT USE OF INSULIN (HCC): ICD-10-CM

## 2018-07-22 DIAGNOSIS — N18.30 TYPE 2 DIABETES MELLITUS WITH STAGE 3 CHRONIC KIDNEY DISEASE, WITH LONG-TERM CURRENT USE OF INSULIN (HCC): ICD-10-CM

## 2018-07-22 DIAGNOSIS — Z79.4 TYPE 2 DIABETES MELLITUS WITH STAGE 3 CHRONIC KIDNEY DISEASE, WITH LONG-TERM CURRENT USE OF INSULIN (HCC): ICD-10-CM

## 2018-07-22 DIAGNOSIS — Z85.46 PERSONAL HISTORY OF MALIGNANT NEOPLASM OF PROSTATE: ICD-10-CM

## 2018-08-03 LAB
ALBUMIN SERPL-MCNC: 4.1 G/DL (ref 3.5–5.2)
ALBUMIN/CREAT UR: <2.4 MG/G CREAT (ref 0–30)
ALBUMIN/GLOB SERPL: 1.6 G/DL
ALP SERPL-CCNC: 125 U/L (ref 39–117)
ALT SERPL-CCNC: 24 U/L (ref 1–41)
AST SERPL-CCNC: 27 U/L (ref 1–40)
BASOPHILS # BLD AUTO: 0.02 10*3/MM3 (ref 0–0.2)
BASOPHILS NFR BLD AUTO: 0.3 % (ref 0–1.5)
BILIRUB SERPL-MCNC: 0.5 MG/DL (ref 0.1–1.2)
BUN SERPL-MCNC: 21 MG/DL (ref 8–23)
BUN/CREAT SERPL: 19.6 (ref 7–25)
CALCIUM SERPL-MCNC: 9.1 MG/DL (ref 8.6–10.5)
CHLORIDE SERPL-SCNC: 103 MMOL/L (ref 98–107)
CHOLEST SERPL-MCNC: 149 MG/DL (ref 0–200)
CO2 SERPL-SCNC: 26.1 MMOL/L (ref 22–29)
CREAT SERPL-MCNC: 1.07 MG/DL (ref 0.76–1.27)
CREAT UR-MCNC: 125.2 MG/DL
EOSINOPHIL # BLD AUTO: 0.27 10*3/MM3 (ref 0–0.7)
EOSINOPHIL NFR BLD AUTO: 4.6 % (ref 0.3–6.2)
ERYTHROCYTE [DISTWIDTH] IN BLOOD BY AUTOMATED COUNT: 13.7 % (ref 11.5–14.5)
GLOBULIN SER CALC-MCNC: 2.5 GM/DL
GLUCOSE SERPL-MCNC: 176 MG/DL (ref 65–99)
HCT VFR BLD AUTO: 44.9 % (ref 40.4–52.2)
HDLC SERPL-MCNC: 37 MG/DL (ref 40–60)
HGB BLD-MCNC: 15.2 G/DL (ref 13.7–17.6)
IMM GRANULOCYTES # BLD: 0 10*3/MM3 (ref 0–0.03)
IMM GRANULOCYTES NFR BLD: 0 % (ref 0–0.5)
LDLC SERPL CALC-MCNC: 92 MG/DL (ref 0–100)
LYMPHOCYTES # BLD AUTO: 1.25 10*3/MM3 (ref 0.9–4.8)
LYMPHOCYTES NFR BLD AUTO: 21.2 % (ref 19.6–45.3)
MCH RBC QN AUTO: 30.6 PG (ref 27–32.7)
MCHC RBC AUTO-ENTMCNC: 33.9 G/DL (ref 32.6–36.4)
MCV RBC AUTO: 90.3 FL (ref 79.8–96.2)
MICROALBUMIN UR-MCNC: <3 UG/ML
MONOCYTES # BLD AUTO: 0.41 10*3/MM3 (ref 0.2–1.2)
MONOCYTES NFR BLD AUTO: 7 % (ref 5–12)
NEUTROPHILS # BLD AUTO: 3.94 10*3/MM3 (ref 1.9–8.1)
NEUTROPHILS NFR BLD AUTO: 66.9 % (ref 42.7–76)
PLATELET # BLD AUTO: 162 10*3/MM3 (ref 140–500)
POTASSIUM SERPL-SCNC: 4.3 MMOL/L (ref 3.5–5.2)
PROT SERPL-MCNC: 6.6 G/DL (ref 6–8.5)
PSA SERPL-MCNC: 0.09 NG/ML (ref 0–4)
RBC # BLD AUTO: 4.97 10*6/MM3 (ref 4.6–6)
SODIUM SERPL-SCNC: 142 MMOL/L (ref 136–145)
TRIGL SERPL-MCNC: 100 MG/DL (ref 0–150)
TSH SERPL DL<=0.005 MIU/L-ACNC: 5.91 MIU/ML (ref 0.27–4.2)
VLDLC SERPL CALC-MCNC: 20 MG/DL (ref 5–40)
WBC # BLD AUTO: 5.89 10*3/MM3 (ref 4.5–10.7)

## 2018-08-08 ENCOUNTER — OFFICE VISIT (OUTPATIENT)
Dept: FAMILY MEDICINE CLINIC | Facility: CLINIC | Age: 78
End: 2018-08-08

## 2018-08-08 VITALS
DIASTOLIC BLOOD PRESSURE: 78 MMHG | SYSTOLIC BLOOD PRESSURE: 132 MMHG | OXYGEN SATURATION: 98 % | HEART RATE: 61 BPM | WEIGHT: 177 LBS | HEIGHT: 70 IN | BODY MASS INDEX: 25.34 KG/M2

## 2018-08-08 DIAGNOSIS — E11.9 TYPE 2 DIABETES MELLITUS WITHOUT COMPLICATION, WITH LONG-TERM CURRENT USE OF INSULIN (HCC): Primary | ICD-10-CM

## 2018-08-08 DIAGNOSIS — Z79.4 TYPE 2 DIABETES MELLITUS WITHOUT COMPLICATION, WITH LONG-TERM CURRENT USE OF INSULIN (HCC): Primary | ICD-10-CM

## 2018-08-08 LAB
EXPIRATION DATE: ABNORMAL
HBA1C MFR BLD: 7.6 %
Lab: ABNORMAL

## 2018-08-08 PROCEDURE — 83036 HEMOGLOBIN GLYCOSYLATED A1C: CPT | Performed by: INTERNAL MEDICINE

## 2018-08-08 PROCEDURE — 36415 COLL VENOUS BLD VENIPUNCTURE: CPT | Performed by: INTERNAL MEDICINE

## 2018-08-08 PROCEDURE — 99213 OFFICE O/P EST LOW 20 MIN: CPT | Performed by: INTERNAL MEDICINE

## 2018-08-08 RX ORDER — METFORMIN HYDROCHLORIDE 750 MG/1
750 TABLET, EXTENDED RELEASE ORAL
Qty: 60 TABLET | Refills: 3 | Status: SHIPPED | OUTPATIENT
Start: 2018-08-08 | End: 2019-02-04 | Stop reason: SDUPTHER

## 2018-08-08 NOTE — PROGRESS NOTES
"Subjective   Bennie Greene is a 77 y.o. male who presents today for:    Diabetes (review labs)    History of Present Illness   He is now on an insulin requiring diabetic as a result of severe pancreatitis several years ago.  He was not diabetic and on medication prior to the pancreatitis episode.  He is diligent in giving himself insulin.  He checks his blood sugars every morning and sees results that are \"probably 10 points higher than they should be\".    Rare hypoglycemic episodes.      Mr. Greene  reports that he has never smoked. He has never used smokeless tobacco. He reports that he does not drink alcohol or use drugs.     No Known Allergies    Current Outpatient Prescriptions:   •  aspirin 81 MG chewable tablet, Chew 81 mg daily., Disp: , Rfl:   •  JUVENTINO CONTOUR TEST test strip, USE TO TEST BLOOD SUGAR 3 TIMES A DAY for uncontrolled type 2 diabetes (E11.65)., Disp: 100 each, Rfl: 12  •  JUVENTINO MICROLET LANCETS lancets, USE 1 TIME A DAY, Disp: 100 each, Rfl: 3  •  BD PEN NEEDLE DELISA U/F 32G X 4 MM misc, USE 1 WITH LANTUS EVERY DAY, Disp: 100 each, Rfl: 3  •  Insulin Degludec (TRESIBA FLEXTOUCH) 200 UNIT/ML solution pen-injector, Inject 30 Units under the skin Daily., Disp: 10 pen, Rfl: 1  •  omeprazole (PriLOSEC) 20 MG capsule, Take 20 mg by mouth daily., Disp: , Rfl:       Review of Systems   Constitutional: Negative for fatigue.   Eyes: Negative for visual disturbance.   Cardiovascular: Negative for chest pain.   Gastrointestinal: Negative for diarrhea.   Endocrine: Negative for polydipsia and polyuria.   Musculoskeletal: Negative for myalgias.   Skin: Negative for wound.   Neurological: Negative for numbness.         Objective   Vitals:    08/08/18 0801   BP: 132/78   BP Location: Left arm   Patient Position: Sitting   Cuff Size: Adult   Pulse: 61   SpO2: 98%   Weight: 80.3 kg (177 lb)   Height: 177.8 cm (70\")     Physical Exam   Constitutional: He is oriented to person, place, and time. He appears " well-developed and well-nourished. No distress.   Eyes: Conjunctivae are normal.   Neck: Neck supple. No thyroid mass and no thyromegaly present.   Cardiovascular: Normal rate, regular rhythm and intact distal pulses.    No murmur heard.  Pulmonary/Chest: Effort normal and breath sounds normal.   Abdominal: There is no tenderness.      During the foot exam he had a monofilament test performed.  Vascular Status -  His right foot exhibits no edema. His left foot exhibits no edema.  Skin Integrity  -  His right foot skin is intact.His left foot skin is intact..  Neurological: He is alert and oriented to person, place, and time. No sensory deficit.   Monofilament testing- normal bilaterally.             Bennie was seen today for diabetes.    Diagnoses and all orders for this visit:    Type 2 diabetes mellitus without complication, with long-term current use of insulin (CMS/[facility])  -     POCT glycated hemoglobin, total  -     metFORMIN ER (GLUCOPHAGE-XR) 750 MG 24 hr tablet; Take 1 tablet by mouth Daily With Breakfast.    1C of 7.6%.  Fasting blood sugar was 176 prior to today's office visit.  Remainder of his labs look pretty good.  TSH was marginally elevated, so we will recheck this down the road.    We will start the metformin as ordered above and see if that helps get his A1c down for prolonged period we will not increase his insulin.  If he starts noticing hypoglycemic episodes, he will contact us so that we can guide his reduction of the insulin as opposed to discontinuation of the metformin.  We will also check a C-peptide level with his next set of labs to help[ determine if we can transition him away from insulin and over to oral medications.

## 2018-09-07 DIAGNOSIS — E11.9 TYPE 2 DIABETES MELLITUS WITHOUT COMPLICATION, UNSPECIFIED LONG TERM INSULIN USE STATUS: Primary | ICD-10-CM

## 2018-09-24 RX ORDER — PEN NEEDLE, DIABETIC 32GX 5/32"
NEEDLE, DISPOSABLE MISCELLANEOUS
Qty: 100 EACH | Refills: 3 | Status: SHIPPED | OUTPATIENT
Start: 2018-09-24 | End: 2019-12-06 | Stop reason: SDUPTHER

## 2018-10-23 DIAGNOSIS — E11.9 TYPE 2 DIABETES MELLITUS WITHOUT COMPLICATION, WITHOUT LONG-TERM CURRENT USE OF INSULIN (HCC): ICD-10-CM

## 2018-10-23 RX ORDER — INSULIN DEGLUDEC 200 U/ML
INJECTION, SOLUTION SUBCUTANEOUS
Qty: 18 PEN | Refills: 0 | Status: SHIPPED | OUTPATIENT
Start: 2018-10-23 | End: 2019-01-25 | Stop reason: SDUPTHER

## 2018-11-01 ENCOUNTER — RESULTS ENCOUNTER (OUTPATIENT)
Dept: FAMILY MEDICINE CLINIC | Facility: CLINIC | Age: 78
End: 2018-11-01

## 2018-11-01 DIAGNOSIS — E11.9 TYPE 2 DIABETES MELLITUS WITHOUT COMPLICATION, WITH LONG-TERM CURRENT USE OF INSULIN (HCC): ICD-10-CM

## 2018-11-01 DIAGNOSIS — Z79.4 TYPE 2 DIABETES MELLITUS WITHOUT COMPLICATION, WITH LONG-TERM CURRENT USE OF INSULIN (HCC): ICD-10-CM

## 2018-11-28 ENCOUNTER — OFFICE VISIT (OUTPATIENT)
Dept: FAMILY MEDICINE CLINIC | Facility: CLINIC | Age: 78
End: 2018-11-28

## 2018-11-28 VITALS
DIASTOLIC BLOOD PRESSURE: 84 MMHG | SYSTOLIC BLOOD PRESSURE: 126 MMHG | OXYGEN SATURATION: 93 % | WEIGHT: 181.5 LBS | BODY MASS INDEX: 25.98 KG/M2 | HEIGHT: 70 IN | HEART RATE: 63 BPM

## 2018-11-28 DIAGNOSIS — E78.5 DYSLIPIDEMIA: ICD-10-CM

## 2018-11-28 DIAGNOSIS — E11.9 TYPE 2 DIABETES MELLITUS WITHOUT COMPLICATION, WITH LONG-TERM CURRENT USE OF INSULIN (HCC): Primary | ICD-10-CM

## 2018-11-28 DIAGNOSIS — Z79.4 TYPE 2 DIABETES MELLITUS WITHOUT COMPLICATION, WITH LONG-TERM CURRENT USE OF INSULIN (HCC): Primary | ICD-10-CM

## 2018-11-28 PROCEDURE — 99213 OFFICE O/P EST LOW 20 MIN: CPT | Performed by: INTERNAL MEDICINE

## 2018-11-28 NOTE — PROGRESS NOTES
Subjective   Bennie Greene is a 78 y.o. male who presents today for:    Diabetes (4 month f/u & review labs) and Hyperlipidemia    History of Present Illness   He is now on an insulin requiring diabetic as a result of severe pancreatitis several years ago.  He was not diabetic and on medication prior to the pancreatitis episode.  He is diligent in giving himself insulin.  He checks his blood sugars every morning; he did not bring a log with him today.    He has seen readings in the 170s on occasion.  He was given metformin in August to help get the sugars down, but often forgets to take it.  Last A1c was 7.6%.        Mr. Greene  reports that  has never smoked. he has never used smokeless tobacco. He reports that he does not drink alcohol or use drugs.     No Known Allergies    Current Outpatient Medications:   •  aspirin 81 MG chewable tablet, Chew 81 mg daily., Disp: , Rfl:   •  JUVENTINO CONTOUR TEST test strip, USE TO TEST BLOOD SUGAR 3 TIMES A DAY for uncontrolled type 2 diabetes (E11.65)., Disp: 100 each, Rfl: 12  •  JUVENTINO MICROLET LANCETS lancets, USE 1 TIME A DAY, Disp: 100 each, Rfl: 3  •  BD PEN NEEDLE DELISA U/F 32G X 4 MM misc, USE 1 WITH LANTUS EVERY DAY, Disp: 100 each, Rfl: 3  •  metFORMIN ER (GLUCOPHAGE-XR) 750 MG 24 hr tablet, Take 1 tablet by mouth Daily With Breakfast., Disp: 60 tablet, Rfl: 3  •  omeprazole (PriLOSEC) 20 MG capsule, Take 20 mg by mouth daily., Disp: , Rfl:   •  TRESIBA FLEXTOUCH 200 UNIT/ML solution pen-injector, INJECT 22 UNITS UNDER THE SKIN DAILY. (Patient taking differently: INJECT 24 UNITS UNDER THE SKIN DAILY.), Disp: 18 pen, Rfl: 0      Review of Systems   Constitutional: Negative for fatigue.   Eyes: Negative for visual disturbance.   Cardiovascular: Negative for chest pain.   Endocrine: Negative for polydipsia and polyuria.   Musculoskeletal: Negative for myalgias.   Skin: Negative for wound.   Neurological: Negative for numbness.         Objective   Vitals:    11/28/18 0841  "  BP: 126/84   BP Location: Right arm   Patient Position: Sitting   Cuff Size: Adult   Pulse: 63   SpO2: 93%   Weight: 82.3 kg (181 lb 8 oz)   Height: 177.8 cm (70\")     Physical Exam   Constitutional: He is oriented to person, place, and time. He appears well-developed and well-nourished. No distress.   Neck: No thyromegaly present.   Cardiovascular: Normal rate, regular rhythm and normal heart sounds.   Neurological: He is alert and oriented to person, place, and time.   Psychiatric: He has a normal mood and affect. His behavior is normal.           Bennie was seen today for diabetes and hyperlipidemia.    Diagnoses and all orders for this visit:    Type 2 diabetes mellitus without complication, with long-term current use of insulin (CMS/[facility])    Dyslipidemia    Take the metformin regularly.  Increase the fluid intake (aater or watered down Gatorade).  Follow-up in 4 months.  "

## 2018-11-29 LAB
ALBUMIN SERPL-MCNC: 3.6 G/DL (ref 3.5–5.2)
ALBUMIN/GLOB SERPL: 1.1 G/DL
ALP SERPL-CCNC: 123 U/L (ref 39–117)
ALT SERPL-CCNC: 23 U/L (ref 1–41)
AST SERPL-CCNC: 18 U/L (ref 1–40)
BILIRUB SERPL-MCNC: 0.4 MG/DL (ref 0.1–1.2)
BUN SERPL-MCNC: 15 MG/DL (ref 8–23)
BUN/CREAT SERPL: 14.7 (ref 7–25)
C PEPTIDE SERPL-MCNC: 1.2 NG/ML (ref 1.1–4.4)
CALCIUM SERPL-MCNC: 9.1 MG/DL (ref 8.6–10.5)
CHLORIDE SERPL-SCNC: 103 MMOL/L (ref 98–107)
CO2 SERPL-SCNC: 26.6 MMOL/L (ref 22–29)
CREAT SERPL-MCNC: 1.02 MG/DL (ref 0.76–1.27)
GLOBULIN SER CALC-MCNC: 3.2 GM/DL
GLUCOSE SERPL-MCNC: 190 MG/DL (ref 65–99)
HBA1C MFR BLD: 8.8 % (ref 4.8–5.6)
POTASSIUM SERPL-SCNC: 4.3 MMOL/L (ref 3.5–5.2)
PROT SERPL-MCNC: 6.8 G/DL (ref 6–8.5)
SODIUM SERPL-SCNC: 141 MMOL/L (ref 136–145)

## 2018-12-07 DIAGNOSIS — Z79.4 TYPE 2 DIABETES MELLITUS WITHOUT COMPLICATION, WITH LONG-TERM CURRENT USE OF INSULIN (HCC): Primary | ICD-10-CM

## 2018-12-07 DIAGNOSIS — E11.9 TYPE 2 DIABETES MELLITUS WITHOUT COMPLICATION, WITH LONG-TERM CURRENT USE OF INSULIN (HCC): Primary | ICD-10-CM

## 2018-12-07 DIAGNOSIS — E11.9 TYPE 2 DIABETES MELLITUS WITHOUT COMPLICATION, WITHOUT LONG-TERM CURRENT USE OF INSULIN (HCC): ICD-10-CM

## 2019-01-16 DIAGNOSIS — E11.9 TYPE 2 DIABETES MELLITUS WITHOUT COMPLICATION, WITHOUT LONG-TERM CURRENT USE OF INSULIN (HCC): ICD-10-CM

## 2019-01-16 DIAGNOSIS — E11.9 TYPE 2 DIABETES MELLITUS WITHOUT COMPLICATION (HCC): ICD-10-CM

## 2019-01-16 DIAGNOSIS — Z79.4 TYPE 2 DIABETES MELLITUS WITHOUT COMPLICATION, WITH LONG-TERM CURRENT USE OF INSULIN (HCC): Primary | ICD-10-CM

## 2019-01-16 DIAGNOSIS — E11.9 TYPE 2 DIABETES MELLITUS WITHOUT COMPLICATION, WITH LONG-TERM CURRENT USE OF INSULIN (HCC): Primary | ICD-10-CM

## 2019-01-25 DIAGNOSIS — E11.9 TYPE 2 DIABETES MELLITUS WITHOUT COMPLICATION, WITHOUT LONG-TERM CURRENT USE OF INSULIN (HCC): ICD-10-CM

## 2019-02-04 RX ORDER — METFORMIN HYDROCHLORIDE 750 MG/1
750 TABLET, EXTENDED RELEASE ORAL
Qty: 60 TABLET | Refills: 3 | Status: SHIPPED | OUTPATIENT
Start: 2019-02-04 | End: 2020-02-27 | Stop reason: SDUPTHER

## 2019-02-15 ENCOUNTER — OFFICE VISIT (OUTPATIENT)
Dept: FAMILY MEDICINE CLINIC | Facility: CLINIC | Age: 79
End: 2019-02-15

## 2019-02-15 VITALS
BODY MASS INDEX: 26.18 KG/M2 | OXYGEN SATURATION: 91 % | HEART RATE: 61 BPM | SYSTOLIC BLOOD PRESSURE: 122 MMHG | WEIGHT: 182.9 LBS | HEIGHT: 70 IN | DIASTOLIC BLOOD PRESSURE: 78 MMHG

## 2019-02-15 DIAGNOSIS — E11.65 TYPE 2 DIABETES MELLITUS WITH HYPERGLYCEMIA, WITHOUT LONG-TERM CURRENT USE OF INSULIN (HCC): Primary | ICD-10-CM

## 2019-02-15 LAB
EXPIRATION DATE: ABNORMAL
HBA1C MFR BLD: 8.8 % (ref 4.8–5.6)
Lab: ABNORMAL

## 2019-02-15 PROCEDURE — 83036 HEMOGLOBIN GLYCOSYLATED A1C: CPT | Performed by: INTERNAL MEDICINE

## 2019-02-15 PROCEDURE — 36416 COLLJ CAPILLARY BLOOD SPEC: CPT | Performed by: INTERNAL MEDICINE

## 2019-02-15 PROCEDURE — 99213 OFFICE O/P EST LOW 20 MIN: CPT | Performed by: INTERNAL MEDICINE

## 2019-02-15 NOTE — PATIENT INSTRUCTIONS
Increase insulin to 30 Units daily.    Resume the Metformin 750 m pill daily for 2 weeks.  Increase to 2 pills daily (or one pill twice a day, after meals) thereafter, if no problems.

## 2019-02-15 NOTE — PROGRESS NOTES
Subjective   Bennie Greene is a 78 y.o. 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  Because of it, he was started on insulin initially and has been maintained on insulin ever since.  He has only needed about 20-30 units of insulin.  In mid 2018, his A1c started to climb.  By late 2018, his A1c was 8.8%.  We have advocated for adding metformin earlier in the year, but went from sporadically taking it to not taking it at all.  He still is maintained on Tresiba 24 units once daily.    He checks his sugars every morning and sees readings between 130 and 170.  He denies any hypoglycemic episodes.  He does not check his blood sugars later in the day.    Mr. Greene  reports that  has never smoked. he has never used smokeless tobacco. He reports that he does not drink alcohol or use drugs.     No Known Allergies    Current Outpatient Medications:   •  aspirin 81 MG chewable tablet, Chew 81 mg daily., Disp: , Rfl:   •  JUVENTINO CONTOUR TEST test strip, USE TO TEST BLOOD SUGAR 3 TIMES A DAY for uncontrolled type 2 diabetes (E11.65)., Disp: 300 each, Rfl: 6  •  JUVENTINO MICROLET LANCETS lancets, Use to  Test blood sugar one time daily., Disp: 300 each, Rfl: 6  •  BD PEN NEEDLE DELISA U/F 32G X 4 MM misc, USE 1 WITH LANTUS EVERY DAY, Disp: 100 each, Rfl: 3  •  Insulin Degludec (TRESIBA FLEXTOUCH) 200 UNIT/ML solution pen-injector, Inject 30 Units under the skin into the appropriate area as directed Daily., Disp: 5 pen, Rfl: 3  •  metFORMIN ER (GLUCOPHAGE-XR) 750 MG 24 hr tablet, Take 1 tablet by mouth Daily With Breakfast., Disp: 60 tablet, Rfl: 3  •  omeprazole (PriLOSEC) 20 MG capsule, Take 20 mg by mouth daily., Disp: , Rfl:       Review of Systems   Constitutional: Negative for unexpected weight change.   Eyes: Negative for visual disturbance.   Cardiovascular: Negative for chest pain.   Neurological:  "Negative for numbness.         Objective   Vitals:    02/15/19 0805   BP: 122/78   BP Location: Left arm   Patient Position: Sitting   Cuff Size: Adult   Pulse: 61   SpO2: 91%   Weight: 83 kg (182 lb 14.4 oz)   Height: 177.8 cm (70\")     Physical Exam   Constitutional: He is oriented to person, place, and time. He appears well-developed and well-nourished. No distress.   Eyes: Conjunctivae are normal. No scleral icterus.   Cardiovascular: Normal rate and regular rhythm.   Neurological: He is alert and oriented to person, place, and time.           Bennie was seen today for diabetes.    Diagnoses and all orders for this visit:    Type 2 diabetes mellitus with hyperglycemia, without long-term current use of insulin (CMS/[facility])  -     POC Glycated Hemoglobin      A1c remains elevated at 8.8%, mainly because he did not restart metformin after our 11/28/18 office visit.  Records show that a refill was called to his pharmacy on 2/4/19, but he did not obtain that medication; he is still not taking metformin.    He will increase the insulin to 30 units and start metformin 750 mg once daily and increase per patient instructions.    I have asked him to return in approximately 4 months for a recheck.  He will either see our nurse practitioner or get established with a new physician in that time.  "

## 2019-03-06 ENCOUNTER — OFFICE VISIT (OUTPATIENT)
Dept: FAMILY MEDICINE CLINIC | Facility: CLINIC | Age: 79
End: 2019-03-06

## 2019-03-06 VITALS
DIASTOLIC BLOOD PRESSURE: 78 MMHG | OXYGEN SATURATION: 94 % | BODY MASS INDEX: 26.43 KG/M2 | RESPIRATION RATE: 16 BRPM | HEIGHT: 70 IN | SYSTOLIC BLOOD PRESSURE: 124 MMHG | WEIGHT: 184.6 LBS | TEMPERATURE: 98.4 F | HEART RATE: 62 BPM

## 2019-03-06 DIAGNOSIS — E11.9 TYPE 2 DIABETES MELLITUS WITHOUT COMPLICATION, WITH LONG-TERM CURRENT USE OF INSULIN (HCC): Primary | ICD-10-CM

## 2019-03-06 DIAGNOSIS — Z79.4 TYPE 2 DIABETES MELLITUS WITHOUT COMPLICATION, WITH LONG-TERM CURRENT USE OF INSULIN (HCC): Primary | ICD-10-CM

## 2019-03-06 PROCEDURE — 99213 OFFICE O/P EST LOW 20 MIN: CPT | Performed by: FAMILY MEDICINE

## 2019-03-06 NOTE — PROGRESS NOTES
Subjective   Bennie Greene is a 78 y.o. male. Presents today to establish care for follow up on diabetes mellitus, [place] [facility] [place]     History of Present Illness     Diabetes Mellitus - Stable.  Last A1c was 8.80.  Eye exam UTD.  Foot exam UTD per patient.  Does not need refills today.  No new numbness or tingling.  He met with his previous PCP about 2 weeks ago.    The following portions of the patient's history were reviewed and updated as appropriate: allergies, current medications, past family history, past medical history, past social history, past surgical history and problem list.    Review of Systems   Constitutional: Negative for activity change, appetite change, fatigue and fever.   HENT: Negative for ear pain, facial swelling and sore throat.    Eyes: Negative for discharge and itching.   Respiratory: Negative for cough, chest tightness and shortness of breath.    Cardiovascular: Negative for chest pain and palpitations.   Gastrointestinal: Negative for abdominal distention and constipation.   Endocrine: Negative for polydipsia, polyphagia and polyuria.   Genitourinary: Negative for difficulty urinating and flank pain.   Musculoskeletal: Negative for arthralgias and back pain.   Skin: Negative for color change, rash and wound.   Allergic/Immunologic: Negative for environmental allergies and food allergies.   Neurological: Negative for weakness and numbness.   Hematological: Negative for adenopathy. Does not bruise/bleed easily.   Psychiatric/Behavioral: Negative for decreased concentration and dysphoric mood. The patient is not nervous/anxious.        Objective   Physical Exam   Constitutional: He is oriented to person, place, and time. He appears well-developed and well-nourished. No distress.   HENT:   Mouth/Throat: Oropharynx is clear and moist. No oropharyngeal exudate.   Eyes: Conjunctivae are normal. Right eye exhibits no discharge. Left eye exhibits no discharge.   Neck:  Normal range of motion. Neck supple.   Cardiovascular: Normal rate, regular rhythm and normal heart sounds. Exam reveals no gallop and no friction rub.   No murmur heard.  Pulmonary/Chest: Effort normal and breath sounds normal. He has no wheezes. He has no rales.   Abdominal: Soft. Bowel sounds are normal. He exhibits no distension. There is no tenderness.   Musculoskeletal: He exhibits no edema or deformity.   Lymphadenopathy:     He has no cervical adenopathy.   Neurological: He is alert and oriented to person, place, and time.   Skin: Skin is warm and dry. No rash noted.   Psychiatric: He has a normal mood and affect. His behavior is normal.   Nursing note and vitals reviewed.      Assessment/Plan   Bennie was seen today for establish care and follow-up.    Diagnoses and all orders for this visit:    Type 2 diabetes mellitus without complication, with long-term current use of insulin (CMS/[facility])    No changes in medications at this time.  Recommend summertime follow-up for labs and repeat A1c.

## 2019-03-06 NOTE — PATIENT INSTRUCTIONS
Diabetes Mellitus and Nutrition  When you have diabetes (diabetes mellitus), it is very important to have healthy eating habits because your blood sugar (glucose) levels are greatly affected by what you eat and drink. Eating healthy foods in the appropriate amounts, at about the same times every day, can help you:  · Control your blood glucose.  · Lower your risk of heart disease.  · Improve your blood pressure.  · Reach or maintain a healthy weight.    Every person with diabetes is different, and each person has different needs for a meal plan. Your health care provider may recommend that you work with a diet and nutrition specialist (dietitian) to make a meal plan that is best for you. Your meal plan may vary depending on factors such as:  · The calories you need.  · The medicines you take.  · Your weight.  · Your blood glucose, blood pressure, and cholesterol levels.  · Your activity level.  · Other health conditions you have, such as heart or kidney disease.    How do carbohydrates affect me?  Carbohydrates affect your blood glucose level more than any other type of food. Eating carbohydrates naturally increases the amount of glucose in your blood. Carbohydrate counting is a method for keeping track of how many carbohydrates you eat. Counting carbohydrates is important to keep your blood glucose at a healthy level, especially if you use insulin or take certain oral diabetes medicines.  It is important to know how many carbohydrates you can safely have in each meal. This is different for every person. Your dietitian can help you calculate how many carbohydrates you should have at each meal and for snack.  Foods that contain carbohydrates include:  · Bread, cereal, rice, pasta, and crackers.  · Potatoes and corn.  · Peas, beans, and lentils.  · Milk and yogurt.  · Fruit and juice.  · Desserts, such as cakes, cookies, ice cream, and candy.    How does alcohol affect me?  Alcohol can cause a sudden decrease in blood  "glucose (hypoglycemia), especially if you use insulin or take certain oral diabetes medicines. Hypoglycemia can be a life-threatening condition. Symptoms of hypoglycemia (sleepiness, dizziness, and confusion) are similar to symptoms of having too much alcohol.  If your health care provider says that alcohol is safe for you, follow these guidelines:  · Limit alcohol intake to no more than 1 drink per day for nonpregnant women and 2 drinks per day for men. One drink equals 12 oz of beer, 5 oz of wine, or 1½ oz of hard liquor.  · Do not drink on an empty stomach.  · Keep yourself hydrated with water, diet soda, or unsweetened iced tea.  · Keep in mind that regular soda, juice, and other mixers may contain a lot of sugar and must be counted as carbohydrates.    What are tips for following this plan?  Reading food labels  · Start by checking the serving size on the label. The amount of calories, carbohydrates, fats, and other nutrients listed on the label are based on one serving of the food. Many foods contain more than one serving per package.  · Check the total grams (g) of carbohydrates in one serving. You can calculate the number of servings of carbohydrates in one serving by dividing the total carbohydrates by 15. For example, if a food has 30 g of total carbohydrates, it would be equal to 2 servings of carbohydrates.  · Check the number of grams (g) of saturated and trans fats in one serving. Choose foods that have low or no amount of these fats.  · Check the number of milligrams (mg) of sodium in one serving. Most people should limit total sodium intake to less than 2,300 mg per day.  · Always check the nutrition information of foods labeled as \"low-fat\" or \"nonfat\". These foods may be higher in added sugar or refined carbohydrates and should be avoided.  · Talk to your dietitian to identify your daily goals for nutrients listed on the label.  Shopping  · Avoid buying canned, premade, or processed foods. These " foods tend to be high in fat, sodium, and added sugar.  · Shop around the outside edge of the grocery store. This includes fresh fruits and vegetables, bulk grains, fresh meats, and fresh dairy.  Cooking  · Use low-heat cooking methods, such as baking, instead of high-heat cooking methods like deep frying.  · Cook using healthy oils, such as olive, canola, or sunflower oil.  · Avoid cooking with butter, cream, or high-fat meats.  Meal planning  · Eat meals and snacks regularly, preferably at the same times every day. Avoid going long periods of time without eating.  · Eat foods high in fiber, such as fresh fruits, vegetables, beans, and whole grains. Talk to your dietitian about how many servings of carbohydrates you can eat at each meal.  · Eat 4-6 ounces of lean protein each day, such as lean meat, chicken, fish, eggs, or tofu. 1 ounce is equal to 1 ounce of meat, chicken, or fish, 1 egg, or 1/4 cup of tofu.  · Eat some foods each day that contain healthy fats, such as avocado, nuts, seeds, and fish.  Lifestyle    · Check your blood glucose regularly.  · Exercise at least 30 minutes 5 or more days each week, or as told by your health care provider.  · Take medicines as told by your health care provider.  · Do not use any products that contain nicotine or tobacco, such as cigarettes and e-cigarettes. If you need help quitting, ask your health care provider.  · Work with a counselor or diabetes educator to identify strategies to manage stress and any emotional and social challenges.  What are some questions to ask my health care provider?  · Do I need to meet with a diabetes educator?  · Do I need to meet with a dietitian?  · What number can I call if I have questions?  · When are the best times to check my blood glucose?  Where to find more information:  · American Diabetes Association: diabetes.org/food-and-fitness/food  · Academy of Nutrition and Dietetics:  www.eatright.org/resources/health/diseases-and-conditions/diabetes  · National Pledger of Diabetes and Digestive and Kidney Diseases (NIH): www.niddk.nih.gov/health-information/diabetes/overview/diet-eating-physical-activity  Summary  · A healthy meal plan will help you control your blood glucose and maintain a healthy lifestyle.  · Working with a diet and nutrition specialist (dietitian) can help you make a meal plan that is best for you.  · Keep in mind that carbohydrates and alcohol have immediate effects on your blood glucose levels. It is important to count carbohydrates and to use alcohol carefully.  This information is not intended to replace advice given to you by your health care provider. Make sure you discuss any questions you have with your health care provider.  Document Released: 09/14/2006 Document Revised: 01/22/2018 Document Reviewed: 01/22/2018  [employer] Interactive Patient Education © 2018 [employer]

## 2019-05-04 ENCOUNTER — HOSPITAL ENCOUNTER (EMERGENCY)
Facility: HOSPITAL | Age: 79
Discharge: HOME | End: 2019-05-04
Attending: EMERGENCY MEDICINE | Admitting: EMERGENCY MEDICINE

## 2019-05-04 VITALS
BODY MASS INDEX: 23.62 KG/M2 | HEIGHT: 70 IN | SYSTOLIC BLOOD PRESSURE: 124 MMHG | RESPIRATION RATE: 16 BRPM | TEMPERATURE: 97 F | OXYGEN SATURATION: 94 % | HEART RATE: 61 BPM | DIASTOLIC BLOOD PRESSURE: 91 MMHG | WEIGHT: 165 LBS
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
## 2019-05-04 DIAGNOSIS — S50.311A ABRASION OF RIGHT ELBOW, INITIAL ENCOUNTER: Primary | ICD-10-CM

## 2019-05-04 PROCEDURE — 99283 EMERGENCY DEPT VISIT LOW MDM: CPT
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
## 2019-10-01 ENCOUNTER — OFFICE VISIT (OUTPATIENT)
Dept: FAMILY MEDICINE CLINIC | Facility: CLINIC | Age: 79
End: 2019-10-01

## 2019-10-01 VITALS
TEMPERATURE: 98 F | HEIGHT: 70 IN | RESPIRATION RATE: 16 BRPM | DIASTOLIC BLOOD PRESSURE: 74 MMHG | WEIGHT: 179.6 LBS | SYSTOLIC BLOOD PRESSURE: 136 MMHG | BODY MASS INDEX: 25.71 KG/M2 | HEART RATE: 56 BPM | OXYGEN SATURATION: 97 %

## 2019-10-01 DIAGNOSIS — E11.9 TYPE 2 DIABETES MELLITUS WITHOUT COMPLICATION, WITH LONG-TERM CURRENT USE OF INSULIN (HCC): Primary | ICD-10-CM

## 2019-10-01 DIAGNOSIS — Z79.4 TYPE 2 DIABETES MELLITUS WITHOUT COMPLICATION, WITH LONG-TERM CURRENT USE OF INSULIN (HCC): Primary | ICD-10-CM

## 2019-10-01 DIAGNOSIS — E07.9 DISORDER OF THYROID, UNSPECIFIED: ICD-10-CM

## 2019-10-01 DIAGNOSIS — E78.5 DYSLIPIDEMIA: ICD-10-CM

## 2019-10-01 DIAGNOSIS — R79.89 ELEVATED TSH: ICD-10-CM

## 2019-10-01 DIAGNOSIS — Z23 NEED FOR INFLUENZA VACCINATION: ICD-10-CM

## 2019-10-01 LAB
EXPIRATION DATE: ABNORMAL
HBA1C MFR BLD: 9 %
Lab: ABNORMAL

## 2019-10-01 PROCEDURE — G0008 ADMIN INFLUENZA VIRUS VAC: HCPCS | Performed by: FAMILY MEDICINE

## 2019-10-01 PROCEDURE — 99215 OFFICE O/P EST HI 40 MIN: CPT | Performed by: FAMILY MEDICINE

## 2019-10-01 PROCEDURE — 90653 IIV ADJUVANT VACCINE IM: CPT | Performed by: FAMILY MEDICINE

## 2019-10-01 RX ORDER — DIPHENHYDRAMINE HYDROCHLORIDE 25 MG/1
1 CAPSULE, LIQUID FILLED ORAL 2 TIMES DAILY
Qty: 1 EACH | Refills: 0 | Status: SHIPPED | OUTPATIENT
Start: 2019-10-01

## 2019-10-01 NOTE — PROGRESS NOTES
Subjective   Bennie Greene is a 79 y.o. male. Presents today for medication management for diabetes mellitus.     History of Present Illness     Diabetes mellitus- uncontrolled.  Patient taking medication as prescribed.  No new numbness, tingling.   Patient taking Tresiba 24 units daily and metformin ER 750mg.  Currently testing once a day and getting in the 120's.      Dyspilidemia - Stable.  Diet controlled per patient.  Patient is due for labs today.    Elevated TSH - Repeat lab today.  He was not aware that anything was wrong with his thyroid.  His last TSH was just over 5 about a year ago.    The following portions of the patient's history were reviewed and updated as appropriate: allergies, current medications, past family history, past medical history, past social history, past surgical history and problem list.    Review of Systems   Constitutional: Negative for fatigue and fever.   Respiratory: Negative for shortness of breath and wheezing.    Cardiovascular: Negative for chest pain and palpitations.   Gastrointestinal: Negative for constipation and nausea.   All other systems reviewed and are negative.      Objective   Physical Exam   Constitutional: He appears well-developed and well-nourished. No distress.   Cardiovascular: Normal rate, regular rhythm and normal heart sounds. Exam reveals no gallop and no friction rub.   No murmur heard.  Pulmonary/Chest: Effort normal and breath sounds normal. He has no wheezes. He has no rales.   Abdominal: Soft. Bowel sounds are normal. He exhibits no distension. There is no tenderness.   Skin: Skin is warm. Capillary refill takes less than 2 seconds. He is not diaphoretic.   Nursing note and vitals reviewed.      Assessment/Plan   Bennie was seen today for med management.    Diagnoses and all orders for this visit:    Type 2 diabetes mellitus without complication, with long-term current use of insulin (CMS/[facility])  -     Comprehensive Metabolic Panel  -     POCT  glycated hemoglobin, total  -     Hemoglobin A1c  -     glucose blood test strip; 1 each by Other route 2 (Two) Times a Day. Use as instructed  -     Blood Glucose Monitoring Suppl (BLOOD GLUCOSE MONITOR SYSTEM) w/Device kit; 1 each 2 (Two) Times a Day.    Dyslipidemia  -     Lipid Panel    Elevated TSH  -     TSH Rfx On Abnormal To Free T4    Disorder of thyroid, unspecified   -     TSH Rfx On Abnormal To Free T4    Need for influenza vaccination  -     Fluad Quad >65 years (1207-1949)    I spent approximately 40 minutes in the room with greater than 50% of the time counseling regarding this patient's diabetes status as well as pathophysiology, medication uses, side effects, and the usefulness of the A1c.  A1c today was 9.0.  The patient was adamant that his blood sugars have only ever been in the 120s but he only checks once a day and always in the morning.  He wished to have a venous check to make sure he was correct before changing any medication.  He was more interested in changing the insulin than the oral so if it comes back and it still elevated then we will alter his insulin dose.  We will get a cholesterol panel and recheck his TSH in order to make sure his thyroid is back to normal and does not require supplementation.  Flu shot given today.

## 2019-10-01 NOTE — PATIENT INSTRUCTIONS

## 2019-10-02 LAB
ALBUMIN SERPL-MCNC: 4.1 G/DL (ref 3.5–5.2)
ALBUMIN/GLOB SERPL: 1.5 G/DL
ALP SERPL-CCNC: 129 U/L (ref 39–117)
ALT SERPL-CCNC: 13 U/L (ref 1–41)
AST SERPL-CCNC: 16 U/L (ref 1–40)
BILIRUB SERPL-MCNC: 0.6 MG/DL (ref 0.2–1.2)
BUN SERPL-MCNC: 15 MG/DL (ref 8–23)
BUN/CREAT SERPL: 14.2 (ref 7–25)
CALCIUM SERPL-MCNC: 8.9 MG/DL (ref 8.6–10.5)
CHLORIDE SERPL-SCNC: 105 MMOL/L (ref 98–107)
CHOLEST SERPL-MCNC: 160 MG/DL (ref 0–200)
CO2 SERPL-SCNC: 26.5 MMOL/L (ref 22–29)
CREAT SERPL-MCNC: 1.06 MG/DL (ref 0.76–1.27)
GLOBULIN SER CALC-MCNC: 2.8 GM/DL
GLUCOSE SERPL-MCNC: 121 MG/DL (ref 65–99)
HBA1C MFR BLD: 9.6 % (ref 4.8–5.6)
HDLC SERPL-MCNC: 38 MG/DL (ref 40–60)
LDLC SERPL CALC-MCNC: 103 MG/DL (ref 0–100)
POTASSIUM SERPL-SCNC: 4.2 MMOL/L (ref 3.5–5.2)
PROT SERPL-MCNC: 6.9 G/DL (ref 6–8.5)
SODIUM SERPL-SCNC: 144 MMOL/L (ref 136–145)
T4 FREE SERPL-MCNC: 1.15 NG/DL (ref 0.93–1.7)
TRIGL SERPL-MCNC: 97 MG/DL (ref 0–150)
TSH SERPL DL<=0.005 MIU/L-ACNC: 7.41 UIU/ML (ref 0.27–4.2)
VLDLC SERPL CALC-MCNC: 19.4 MG/DL

## 2019-10-03 ENCOUNTER — TELEPHONE (OUTPATIENT)
Dept: FAMILY MEDICINE CLINIC | Facility: CLINIC | Age: 79
End: 2019-10-03

## 2019-10-03 DIAGNOSIS — E07.9 DISORDER OF THYROID, UNSPECIFIED: Primary | ICD-10-CM

## 2019-10-03 RX ORDER — LEVOTHYROXINE SODIUM 0.03 MG/1
25 TABLET ORAL DAILY
Qty: 30 TABLET | Refills: 3 | Status: SHIPPED | OUTPATIENT
Start: 2019-10-03 | End: 2019-12-26

## 2019-10-03 NOTE — TELEPHONE ENCOUNTER
----- Message from Beatrice Martinez MA sent at 10/3/2019  9:16 AM EDT -----  Patient aware. He is okay with started medication for thyroid.

## 2019-12-03 DIAGNOSIS — E11.9 TYPE 2 DIABETES MELLITUS WITHOUT COMPLICATION, WITH LONG-TERM CURRENT USE OF INSULIN (HCC): ICD-10-CM

## 2019-12-03 DIAGNOSIS — Z79.4 TYPE 2 DIABETES MELLITUS WITHOUT COMPLICATION, WITH LONG-TERM CURRENT USE OF INSULIN (HCC): ICD-10-CM

## 2019-12-06 DIAGNOSIS — Z79.4 TYPE 2 DIABETES MELLITUS WITHOUT COMPLICATION, WITH LONG-TERM CURRENT USE OF INSULIN (HCC): Primary | ICD-10-CM

## 2019-12-06 DIAGNOSIS — E11.9 TYPE 2 DIABETES MELLITUS WITHOUT COMPLICATION, WITH LONG-TERM CURRENT USE OF INSULIN (HCC): Primary | ICD-10-CM

## 2019-12-06 RX ORDER — PEN NEEDLE, DIABETIC 32GX 5/32"
NEEDLE, DISPOSABLE MISCELLANEOUS
Qty: 100 EACH | Refills: 3 | Status: SHIPPED | OUTPATIENT
Start: 2019-12-06 | End: 2020-11-19 | Stop reason: SDUPTHER

## 2019-12-26 DIAGNOSIS — E07.9 DISORDER OF THYROID, UNSPECIFIED: ICD-10-CM

## 2019-12-26 RX ORDER — LEVOTHYROXINE SODIUM 0.03 MG/1
TABLET ORAL
Qty: 30 TABLET | Refills: 0 | Status: SHIPPED | OUTPATIENT
Start: 2019-12-26 | End: 2020-01-17

## 2020-01-17 DIAGNOSIS — E07.9 DISORDER OF THYROID, UNSPECIFIED: ICD-10-CM

## 2020-01-17 RX ORDER — LEVOTHYROXINE SODIUM 0.03 MG/1
TABLET ORAL
Qty: 30 TABLET | Refills: 0 | Status: SHIPPED | OUTPATIENT
Start: 2020-01-17 | End: 2020-02-17

## 2020-02-15 DIAGNOSIS — E07.9 DISORDER OF THYROID, UNSPECIFIED: ICD-10-CM

## 2020-02-17 RX ORDER — LEVOTHYROXINE SODIUM 0.03 MG/1
TABLET ORAL
Qty: 30 TABLET | Refills: 0 | Status: SHIPPED | OUTPATIENT
Start: 2020-02-17 | End: 2020-02-27 | Stop reason: SDUPTHER

## 2020-02-27 DIAGNOSIS — E07.9 DISORDER OF THYROID, UNSPECIFIED: ICD-10-CM

## 2020-02-27 DIAGNOSIS — E11.9 TYPE 2 DIABETES MELLITUS WITHOUT COMPLICATION, WITHOUT LONG-TERM CURRENT USE OF INSULIN (HCC): ICD-10-CM

## 2020-02-27 RX ORDER — METFORMIN HYDROCHLORIDE 750 MG/1
750 TABLET, EXTENDED RELEASE ORAL
Qty: 180 TABLET | Refills: 0 | Status: SHIPPED | OUTPATIENT
Start: 2020-02-27 | End: 2020-07-20

## 2020-02-27 RX ORDER — LEVOTHYROXINE SODIUM 0.03 MG/1
25 TABLET ORAL DAILY
Qty: 90 TABLET | Refills: 0 | Status: SHIPPED | OUTPATIENT
Start: 2020-02-27 | End: 2020-03-03

## 2020-03-02 ENCOUNTER — OFFICE VISIT (OUTPATIENT)
Dept: FAMILY MEDICINE CLINIC | Facility: CLINIC | Age: 80
End: 2020-03-02

## 2020-03-02 VITALS
WEIGHT: 179.2 LBS | OXYGEN SATURATION: 98 % | BODY MASS INDEX: 25.65 KG/M2 | TEMPERATURE: 98 F | RESPIRATION RATE: 17 BRPM | HEIGHT: 70 IN | DIASTOLIC BLOOD PRESSURE: 72 MMHG | SYSTOLIC BLOOD PRESSURE: 132 MMHG | HEART RATE: 109 BPM

## 2020-03-02 DIAGNOSIS — Z13.0 SCREENING FOR DEFICIENCY ANEMIA: ICD-10-CM

## 2020-03-02 DIAGNOSIS — E78.49 OTHER HYPERLIPIDEMIA: ICD-10-CM

## 2020-03-02 DIAGNOSIS — E03.9 ACQUIRED HYPOTHYROIDISM: ICD-10-CM

## 2020-03-02 DIAGNOSIS — E11.9 TYPE 2 DIABETES MELLITUS WITHOUT COMPLICATION, WITH LONG-TERM CURRENT USE OF INSULIN (HCC): Primary | ICD-10-CM

## 2020-03-02 DIAGNOSIS — Z79.4 TYPE 2 DIABETES MELLITUS WITHOUT COMPLICATION, WITH LONG-TERM CURRENT USE OF INSULIN (HCC): Primary | ICD-10-CM

## 2020-03-02 LAB
EXPIRATION DATE: ABNORMAL
HBA1C MFR BLD: 7.9 %
Lab: ABNORMAL

## 2020-03-02 PROCEDURE — 83036 HEMOGLOBIN GLYCOSYLATED A1C: CPT | Performed by: FAMILY MEDICINE

## 2020-03-02 PROCEDURE — 99214 OFFICE O/P EST MOD 30 MIN: CPT | Performed by: FAMILY MEDICINE

## 2020-03-02 NOTE — PROGRESS NOTES
Subjective   Bennie Greene is a 79 y.o. male. Presents today for medication management for diabetes mellitus.     History of Present Illness     Diabetes mellitus- controlled.  Patient taking medication as prescribed.  No new numbness, tingling.   Patient taking Tresiba 30 units daily and metformin ER 750mg.  Currently testing once a day and getting in the 120's.      Dyspilidemia - Stable.  Diet controlled per patient.      Hypothyroidism - Stable.  Taking 25 mcg daily.  Patient denying any symptoms of hypothyroidism such as cold intolerance, constipation, mood swings.    The following portions of the patient's history were reviewed and updated as appropriate: allergies, current medications, past family history, past medical history, past social history, past surgical history and problem list.    Review of Systems   Constitutional: Negative for fatigue and fever.   Respiratory: Negative for shortness of breath and wheezing.    Cardiovascular: Negative for chest pain and palpitations.   Gastrointestinal: Negative for constipation and nausea.   All other systems reviewed and are negative.      Objective   Physical Exam   Constitutional: He appears well-developed and well-nourished. No distress.   Cardiovascular: Normal rate, regular rhythm and normal heart sounds. Exam reveals no gallop and no friction rub.   No murmur heard.  Pulmonary/Chest: Effort normal and breath sounds normal. He has no wheezes. He has no rales.   Abdominal: Soft. Bowel sounds are normal. He exhibits no distension. There is no tenderness.   Skin: Skin is warm. Capillary refill takes less than 2 seconds. He is not diaphoretic.   Nursing note and vitals reviewed.  I have reviewed the HPI, review of systems, and physical exam and they are all accurate with the patient and for this encounter.  A1c - 7.9    Assessment/Plan   Bennie was seen today for med management.    Diagnoses and all orders for this visit:    Type 2 diabetes mellitus without  complication, with long-term current use of insulin (CMS/[facility])  -     TSH Rfx On Abnormal To Free T4  -     Comprehensive Metabolic Panel; Future  -     Hemoglobin A1c; Future    Other hyperlipidemia  -     Lipid Panel; Future    Acquired hypothyroidism  -     TSH Rfx On Abnormal To Free T4  -     TSH Rfx On Abnormal To Free T4; Future    Screening for deficiency anemia  -     CBC (No Diff); Future    Everything appears to be stable.  We will get a repeat of his thyroid hormone today.  I went ahead and put in for his labs for his nex Medicare visit.  I will see him in 6 months and he will do the labs prior to his visit.  Continue all medications as the same.

## 2020-03-02 NOTE — PATIENT INSTRUCTIONS

## 2020-03-03 ENCOUNTER — TELEPHONE (OUTPATIENT)
Dept: FAMILY MEDICINE CLINIC | Facility: CLINIC | Age: 80
End: 2020-03-03

## 2020-03-03 DIAGNOSIS — E07.9 DISORDER OF THYROID, UNSPECIFIED: ICD-10-CM

## 2020-03-03 LAB
T4 FREE SERPL-MCNC: 1.18 NG/DL (ref 0.93–1.7)
TSH SERPL DL<=0.005 MIU/L-ACNC: 6.32 UIU/ML (ref 0.27–4.2)

## 2020-03-03 RX ORDER — LEVOTHYROXINE SODIUM 0.05 MG/1
50 TABLET ORAL DAILY
Qty: 90 TABLET | Refills: 3 | Status: SHIPPED | OUTPATIENT
Start: 2020-03-03 | End: 2020-10-01

## 2020-03-03 NOTE — TELEPHONE ENCOUNTER
Your thyroid level is a little better but still needs some work.  I would increase your thyroid to 50 mcg/day.  I will then send a new prescription for that.  You may feel free to take 2 of your 25's per day to finish those out until you get the 50 mcg/day prescription.  Please call for lab orders about a week or so before I see you next time and we will recheck your thyroid in a few other things.

## 2020-05-24 DIAGNOSIS — E07.9 DISORDER OF THYROID, UNSPECIFIED: ICD-10-CM

## 2020-05-26 RX ORDER — LEVOTHYROXINE SODIUM 0.03 MG/1
TABLET ORAL
Qty: 90 TABLET | Refills: 0 | Status: SHIPPED | OUTPATIENT
Start: 2020-05-26 | End: 2020-10-01

## 2020-05-27 DIAGNOSIS — E11.9 TYPE 2 DIABETES MELLITUS WITHOUT COMPLICATION, WITHOUT LONG-TERM CURRENT USE OF INSULIN (HCC): ICD-10-CM

## 2020-05-27 RX ORDER — INSULIN DEGLUDEC 200 U/ML
INJECTION, SOLUTION SUBCUTANEOUS
Qty: 5 PEN | Refills: 2 | Status: SHIPPED | OUTPATIENT
Start: 2020-05-27 | End: 2020-08-20

## 2020-07-20 RX ORDER — METFORMIN HYDROCHLORIDE 750 MG/1
TABLET, EXTENDED RELEASE ORAL
Qty: 90 TABLET | Refills: 1 | Status: SHIPPED | OUTPATIENT
Start: 2020-07-20 | End: 2020-10-01

## 2020-08-20 DIAGNOSIS — E11.9 TYPE 2 DIABETES MELLITUS WITHOUT COMPLICATION, WITHOUT LONG-TERM CURRENT USE OF INSULIN (HCC): ICD-10-CM

## 2020-08-20 RX ORDER — INSULIN DEGLUDEC 200 U/ML
INJECTION, SOLUTION SUBCUTANEOUS
Qty: 18 PEN | Refills: 1 | Status: SHIPPED | OUTPATIENT
Start: 2020-08-20 | End: 2022-01-11

## 2020-08-31 ENCOUNTER — RESULTS ENCOUNTER (OUTPATIENT)
Dept: FAMILY MEDICINE CLINIC | Facility: CLINIC | Age: 80
End: 2020-08-31

## 2020-08-31 DIAGNOSIS — Z79.4 TYPE 2 DIABETES MELLITUS WITHOUT COMPLICATION, WITH LONG-TERM CURRENT USE OF INSULIN (HCC): ICD-10-CM

## 2020-08-31 DIAGNOSIS — Z13.0 SCREENING FOR DEFICIENCY ANEMIA: ICD-10-CM

## 2020-08-31 DIAGNOSIS — E78.49 OTHER HYPERLIPIDEMIA: ICD-10-CM

## 2020-08-31 DIAGNOSIS — E11.9 TYPE 2 DIABETES MELLITUS WITHOUT COMPLICATION, WITH LONG-TERM CURRENT USE OF INSULIN (HCC): ICD-10-CM

## 2020-08-31 DIAGNOSIS — E03.9 ACQUIRED HYPOTHYROIDISM: ICD-10-CM

## 2020-09-24 DIAGNOSIS — E07.9 DISORDER OF THYROID, UNSPECIFIED: ICD-10-CM

## 2020-09-24 LAB
ALBUMIN SERPL-MCNC: 4.1 G/DL (ref 3.5–5.2)
ALBUMIN/GLOB SERPL: 1.7 G/DL
ALP SERPL-CCNC: 132 U/L (ref 39–117)
ALT SERPL-CCNC: 19 U/L (ref 1–41)
AST SERPL-CCNC: 19 U/L (ref 1–40)
BILIRUB SERPL-MCNC: 0.5 MG/DL (ref 0–1.2)
BUN SERPL-MCNC: 15 MG/DL (ref 8–23)
BUN/CREAT SERPL: 13.5 (ref 7–25)
CALCIUM SERPL-MCNC: 9 MG/DL (ref 8.6–10.5)
CHLORIDE SERPL-SCNC: 103 MMOL/L (ref 98–107)
CHOLEST SERPL-MCNC: 187 MG/DL (ref 0–200)
CO2 SERPL-SCNC: 29.9 MMOL/L (ref 22–29)
CREAT SERPL-MCNC: 1.11 MG/DL (ref 0.76–1.27)
ERYTHROCYTE [DISTWIDTH] IN BLOOD BY AUTOMATED COUNT: 13.6 % (ref 12.3–15.4)
GLOBULIN SER CALC-MCNC: 2.4 GM/DL
GLUCOSE SERPL-MCNC: 125 MG/DL (ref 65–99)
HBA1C MFR BLD: 7.8 % (ref 4.8–5.6)
HCT VFR BLD AUTO: 45.1 % (ref 37.5–51)
HDLC SERPL-MCNC: 39 MG/DL (ref 40–60)
HGB BLD-MCNC: 14.9 G/DL (ref 13–17.7)
LDLC SERPL CALC-MCNC: 126 MG/DL (ref 0–100)
MCH RBC QN AUTO: 29.2 PG (ref 26.6–33)
MCHC RBC AUTO-ENTMCNC: 33 G/DL (ref 31.5–35.7)
MCV RBC AUTO: 88.4 FL (ref 79–97)
PLATELET # BLD AUTO: 165 10*3/MM3 (ref 140–450)
POTASSIUM SERPL-SCNC: 4 MMOL/L (ref 3.5–5.2)
PROT SERPL-MCNC: 6.5 G/DL (ref 6–8.5)
RBC # BLD AUTO: 5.1 10*6/MM3 (ref 4.14–5.8)
SODIUM SERPL-SCNC: 142 MMOL/L (ref 136–145)
T4 FREE SERPL-MCNC: 1.18 NG/DL (ref 0.93–1.7)
TRIGL SERPL-MCNC: 112 MG/DL (ref 0–150)
TSH SERPL DL<=0.005 MIU/L-ACNC: 7.55 UIU/ML (ref 0.27–4.2)
VLDLC SERPL CALC-MCNC: 22.4 MG/DL
WBC # BLD AUTO: 6.36 10*3/MM3 (ref 3.4–10.8)

## 2020-10-01 ENCOUNTER — OFFICE VISIT (OUTPATIENT)
Dept: FAMILY MEDICINE CLINIC | Facility: CLINIC | Age: 80
End: 2020-10-01

## 2020-10-01 VITALS
TEMPERATURE: 97.3 F | HEART RATE: 65 BPM | OXYGEN SATURATION: 95 % | HEIGHT: 70 IN | DIASTOLIC BLOOD PRESSURE: 80 MMHG | SYSTOLIC BLOOD PRESSURE: 124 MMHG | WEIGHT: 173 LBS | BODY MASS INDEX: 24.77 KG/M2

## 2020-10-01 DIAGNOSIS — Z23 NEED FOR INFLUENZA VACCINATION: ICD-10-CM

## 2020-10-01 DIAGNOSIS — Z79.4 TYPE 2 DIABETES MELLITUS WITHOUT COMPLICATION, WITH LONG-TERM CURRENT USE OF INSULIN (HCC): Primary | ICD-10-CM

## 2020-10-01 DIAGNOSIS — E11.9 TYPE 2 DIABETES MELLITUS WITHOUT COMPLICATION, WITH LONG-TERM CURRENT USE OF INSULIN (HCC): Primary | ICD-10-CM

## 2020-10-01 DIAGNOSIS — E78.49 OTHER HYPERLIPIDEMIA: ICD-10-CM

## 2020-10-01 DIAGNOSIS — E07.9 DISORDER OF THYROID, UNSPECIFIED: ICD-10-CM

## 2020-10-01 PROCEDURE — 90694 VACC AIIV4 NO PRSRV 0.5ML IM: CPT | Performed by: FAMILY MEDICINE

## 2020-10-01 PROCEDURE — G0008 ADMIN INFLUENZA VIRUS VAC: HCPCS | Performed by: FAMILY MEDICINE

## 2020-10-01 PROCEDURE — 99214 OFFICE O/P EST MOD 30 MIN: CPT | Performed by: FAMILY MEDICINE

## 2020-10-01 RX ORDER — METFORMIN HYDROCHLORIDE 500 MG/1
1000 TABLET, EXTENDED RELEASE ORAL
Qty: 180 TABLET | Refills: 3 | Status: SHIPPED | OUTPATIENT
Start: 2020-10-01 | End: 2022-01-11

## 2020-10-01 RX ORDER — LEVOTHYROXINE SODIUM 0.05 MG/1
50 TABLET ORAL DAILY
Qty: 90 TABLET | Refills: 3 | Status: SHIPPED | OUTPATIENT
Start: 2020-10-01 | End: 2022-01-11 | Stop reason: SDUPTHER

## 2020-10-01 NOTE — PROGRESS NOTES
Subjective   Bennie Greene is a 80 y.o. male.  Follow-up on diabetes, hypothyroidism, and hyperlipidemia    History of Present Illness     Diabetes mellitus-stable.  No new numbness, tingling.  Patient is taking metformin XR 750mg as prescribed.  No side effects.  Last A1c was 7.80.      HLD-stable.  Patient taking no meds and is diet controlled.  Trying to adhere to a low-fat diet.    Hypothyroidism - uncontrolled.  Patient taking levothyroxine 25mcg.  Patient denying any symptoms of hypothyroidism such as cold intolerance, constipation, mood swings.  Last TSH 7.550 with T4 1.18      The following portions of the patient's history were reviewed and updated as appropriate: allergies, current medications, past family history, past medical history, past social history, past surgical history and problem list.    Review of Systems   Constitutional: Negative for fatigue and fever.   Respiratory: Negative for shortness of breath and wheezing.    Cardiovascular: Negative for chest pain and palpitations.   Gastrointestinal: Negative for constipation and nausea.       Objective   Physical Exam  Vitals signs and nursing note reviewed.   Constitutional:       General: He is not in acute distress.     Appearance: He is well-developed. He is not diaphoretic.   Cardiovascular:      Rate and Rhythm: Normal rate and regular rhythm.      Heart sounds: Normal heart sounds. No murmur. No friction rub. No gallop.    Pulmonary:      Effort: Pulmonary effort is normal.      Breath sounds: Normal breath sounds. No wheezing or rales.   Skin:     General: Skin is warm.      Capillary Refill: Capillary refill takes less than 2 seconds.         Assessment/Plan   Bennie was seen today for diabetes.    Diagnoses and all orders for this visit:    Type 2 diabetes mellitus without complication, with long-term current use of insulin (CMS/[facility])  -     metFORMIN ER (GLUCOPHAGE-XR) 500 MG 24 hr tablet; Take 2 tablets by mouth Daily With  Breakfast.    Disorder of thyroid, unspecified   -     levothyroxine (Synthroid) 50 MCG tablet; Take 1 tablet by mouth Daily.    Other hyperlipidemia    Need for influenza vaccination  -     Fluad Quad 65+ yrs (1164-1718)      Diabetes is uncontrolled so I will increase his metformin to 1000 mg XR per day.  Hypothyroidism is also out of goal so I will increase his Synthroid to 50 mcg.  LDL is slightly increased in his cholesterol so I will just watch that for now and retest in about 6 months.  Flu shot today.  See back in 6 months.

## 2020-10-01 NOTE — PATIENT INSTRUCTIONS

## 2020-10-20 DIAGNOSIS — Z79.4 TYPE 2 DIABETES MELLITUS WITHOUT COMPLICATION, WITH LONG-TERM CURRENT USE OF INSULIN (HCC): ICD-10-CM

## 2020-10-20 DIAGNOSIS — E11.9 TYPE 2 DIABETES MELLITUS WITHOUT COMPLICATION, WITH LONG-TERM CURRENT USE OF INSULIN (HCC): ICD-10-CM

## 2020-11-19 DIAGNOSIS — Z79.4 TYPE 2 DIABETES MELLITUS WITHOUT COMPLICATION, WITH LONG-TERM CURRENT USE OF INSULIN (HCC): ICD-10-CM

## 2020-11-19 DIAGNOSIS — E11.9 TYPE 2 DIABETES MELLITUS WITHOUT COMPLICATION, WITH LONG-TERM CURRENT USE OF INSULIN (HCC): ICD-10-CM

## 2020-11-19 RX ORDER — PEN NEEDLE, DIABETIC 32GX 5/32"
NEEDLE, DISPOSABLE MISCELLANEOUS
Qty: 100 EACH | Refills: 3 | Status: SHIPPED | OUTPATIENT
Start: 2020-11-19 | End: 2022-02-18 | Stop reason: SDUPTHER

## 2021-04-01 DIAGNOSIS — Z79.4 TYPE 2 DIABETES MELLITUS WITHOUT COMPLICATION, WITH LONG-TERM CURRENT USE OF INSULIN (HCC): ICD-10-CM

## 2021-04-01 DIAGNOSIS — E11.9 TYPE 2 DIABETES MELLITUS WITHOUT COMPLICATION, WITH LONG-TERM CURRENT USE OF INSULIN (HCC): ICD-10-CM

## 2021-04-12 NOTE — PROGRESS NOTES
"Chief Complaint  Diabetes (6 month follow up ), Hyperlipidemia, and Hypothyroidism    Subjective     {Problem List  Visit Diagnosis   Encounters  Notes  Medications  Labs  Result Review Imaging  Media :23}     Bennie Greene presents to [facility]  History of Present Illness    Diabetes mellitus-stable.  No new numbness, tingling.  Patient is taking metformin XR 750mg as prescribed.  No side effects.  Last A1c was 7.80.  Running around .     HLD-stable.  Patient taking no meds and is diet controlled.  Trying to adhere to a low-fat diet.     Hypothyroidism - uncontrolled.  Patient taking levothyroxine 25mcg.  Patient denying any symptoms of hypothyroidism such as cold intolerance, constipation, mood swings.  Last TSH 7.550 with T4 1.18    Objective   Vital Signs:   /80 (BP Location: Left arm, Patient Position: Sitting, Cuff Size: Adult)   Pulse 50   Temp 98 °F (36.7 °C) (Temporal)   Resp 16   Ht 175.3 cm (69\")   Wt 79.2 kg (174 lb 9.6 oz)   SpO2 98%   BMI 25.78 kg/m²     Physical Exam  Vitals and nursing note reviewed.   Constitutional:       General: He is not in acute distress.     Appearance: Normal appearance.   Cardiovascular:      Rate and Rhythm: Normal rate and regular rhythm.      Heart sounds: Normal heart sounds.   Pulmonary:      Effort: Pulmonary effort is normal.      Breath sounds: Normal breath sounds.   Neurological:      Mental Status: He is alert.        Result Review :   The following data was reviewed by: Travon Shah MD on 04/16/2021:  Common labs    Common Labsle 9/23/20 9/23/20 9/23/20 9/23/20    0828 0828 0828 0828   Glucose  125 (A)     BUN  15     Creatinine  1.11     eGFR Non  Am  64     eGFR African Am  77     Sodium  142     Potassium  4.0     Chloride  103     Calcium  9.0     Total Protein  6.5     Albumin  4.10     Total Bilirubin  0.5     Alkaline Phosphatase  132 (A)     AST (SGOT)  19     ALT (SGPT)  19     WBC " 6.36      Hemoglobin 14.9      Hematocrit 45.1      Platelets 165      Total Cholesterol   187    Triglycerides   112    HDL Cholesterol   39 (A)    LDL Cholesterol    126 (A)    Hemoglobin A1C    7.80 (A)   (A) Abnormal value       Comments are available for some flowsheets but are not being displayed.                     Assessment and Plan    Diagnoses and all orders for this visit:    1. Type 2 diabetes mellitus without complication, with long-term current use of insulin (CMS/[facility]) (Primary)  -     CBC (No Diff)  -     Comprehensive Metabolic Panel  -     Hemoglobin A1c    2. Acquired hypothyroidism  -     Comprehensive Metabolic Panel  -     TSH Rfx On Abnormal To Free T4    3. Other hyperlipidemia  -     CBC (No Diff)  -     Comprehensive Metabolic Panel  -     Lipid Panel    4. Encounter for hepatitis C screening test for low risk patient  -     Hepatitis C Antibody      Plan as above.  Labs today.  Continue current medications.      Follow Up   Return in about 6 months (around 10/16/2021) for Recheck.  Patient was given instructions and counseling regarding his condition or for health maintenance advice. Please see specific information pulled into the AVS if appropriate.

## 2021-04-16 ENCOUNTER — OFFICE VISIT (OUTPATIENT)
Dept: INTERNAL MEDICINE | Facility: CLINIC | Age: 81
End: 2021-04-16

## 2021-04-16 VITALS
SYSTOLIC BLOOD PRESSURE: 132 MMHG | DIASTOLIC BLOOD PRESSURE: 80 MMHG | RESPIRATION RATE: 16 BRPM | HEIGHT: 69 IN | HEART RATE: 50 BPM | WEIGHT: 174.6 LBS | TEMPERATURE: 98 F | BODY MASS INDEX: 25.86 KG/M2 | OXYGEN SATURATION: 98 %

## 2021-04-16 DIAGNOSIS — E03.9 ACQUIRED HYPOTHYROIDISM: ICD-10-CM

## 2021-04-16 DIAGNOSIS — Z79.4 TYPE 2 DIABETES MELLITUS WITHOUT COMPLICATION, WITH LONG-TERM CURRENT USE OF INSULIN (HCC): Primary | ICD-10-CM

## 2021-04-16 DIAGNOSIS — E78.49 OTHER HYPERLIPIDEMIA: ICD-10-CM

## 2021-04-16 DIAGNOSIS — E11.9 TYPE 2 DIABETES MELLITUS WITHOUT COMPLICATION, WITH LONG-TERM CURRENT USE OF INSULIN (HCC): Primary | ICD-10-CM

## 2021-04-16 DIAGNOSIS — Z11.59 ENCOUNTER FOR HEPATITIS C SCREENING TEST FOR LOW RISK PATIENT: ICD-10-CM

## 2021-04-16 PROCEDURE — 99214 OFFICE O/P EST MOD 30 MIN: CPT | Performed by: FAMILY MEDICINE

## 2021-04-16 NOTE — PATIENT INSTRUCTIONS
Diabetes Mellitus and Nutrition, Adult  When you have diabetes, or diabetes mellitus, it is very important to have healthy eating habits because your blood sugar (glucose) levels are greatly affected by what you eat and drink. Eating healthy foods in the right amounts, at about the same times every day, can help you:  · Control your blood glucose.  · Lower your risk of heart disease.  · Improve your blood pressure.  · Reach or maintain a healthy weight.  What can affect my meal plan?  Every person with diabetes is different, and each person has different needs for a meal plan. Your health care provider may recommend that you work with a dietitian to make a meal plan that is best for you. Your meal plan may vary depending on factors such as:  · The calories you need.  · The medicines you take.  · Your weight.  · Your blood glucose, blood pressure, and cholesterol levels.  · Your activity level.  · Other health conditions you have, such as heart or kidney disease.  How do carbohydrates affect me?  Carbohydrates, also called carbs, affect your blood glucose level more than any other type of food. Eating carbs naturally raises the amount of glucose in your blood. Carb counting is a method for keeping track of how many carbs you eat. Counting carbs is important to keep your blood glucose at a healthy level, especially if you use insulin or take certain oral diabetes medicines.  It is important to know how many carbs you can safely have in each meal. This is different for every person. Your dietitian can help you calculate how many carbs you should have at each meal and for each snack.  How does alcohol affect me?  Alcohol can cause a sudden decrease in blood glucose (hypoglycemia), especially if you use insulin or take certain oral diabetes medicines. Hypoglycemia can be a life-threatening condition. Symptoms of hypoglycemia, such as sleepiness, dizziness, and confusion, are similar to symptoms of having too much  "alcohol.  · Do not drink alcohol if:  ? Your health care provider tells you not to drink.  ? You are pregnant, may be pregnant, or are planning to become pregnant.  · If you drink alcohol:  ? Do not drink on an empty stomach.  ? Limit how much you use to:  § 0-1 drink a day for women.  § 0-2 drinks a day for men.  ? Be aware of how much alcohol is in your drink. In the [place] one drink equals one 12 oz bottle of beer (355 mL), one 5 oz glass of wine (148 mL), or one 1½ oz glass of hard liquor (44 mL).  ? Keep yourself hydrated with water, diet soda, or unsweetened iced tea.  § Keep in mind that regular soda, juice, and other mixers may contain a lot of sugar and must be counted as carbs.  What are tips for following this plan?    Reading food labels  · Start by checking the serving size on the \"Nutrition Facts\" label of packaged foods and drinks. The amount of calories, carbs, fats, and other nutrients listed on the label is based on one serving of the item. Many items contain more than one serving per package.  · Check the total grams (g) of carbs in one serving. You can calculate the number of servings of carbs in one serving by dividing the total carbs by 15. For example, if a food has 30 g of total carbs per serving, it would be equal to 2 servings of carbs.  · Check the number of grams (g) of saturated fats and trans fats in one serving. Choose foods that have a low amount or none of these fats.  · Check the number of milligrams (mg) of salt (sodium) in one serving. Most people should limit total sodium intake to less than 2,300 mg per day.  · Always check the nutrition information of foods labeled as \"low-fat\" or \"nonfat.\" These foods may be higher in added sugar or refined carbs and should be avoided.  · Talk to your dietitian to identify your daily goals for nutrients listed on the label.  Shopping  · Avoid buying canned, pre-made, or processed foods. These foods tend to be high in fat, sodium, and added " sugar.  · Shop around the outside edge of the grocery store. This is where you will most often find fresh fruits and vegetables, bulk grains, fresh meats, and fresh dairy.  Cooking  · Use low-heat cooking methods, such as baking, instead of high-heat cooking methods like deep frying.  · Cook using healthy oils, such as olive, canola, or sunflower oil.  · Avoid cooking with butter, cream, or high-fat meats.  Meal planning  · Eat meals and snacks regularly, preferably at the same times every day. Avoid going long periods of time without eating.  · Eat foods that are high in fiber, such as fresh fruits, vegetables, beans, and whole grains. Talk with your dietitian about how many servings of carbs you can eat at each meal.  · Eat 4-6 oz (112-168 g) of lean protein each day, such as lean meat, chicken, fish, eggs, or tofu. One ounce (oz) of lean protein is equal to:  ? 1 oz (28 g) of meat, chicken, or fish.  ? 1 egg.  ? ¼ cup (62 g) of tofu.  · Eat some foods each day that contain healthy fats, such as avocado, nuts, seeds, and fish.  What foods should I eat?  Fruits  Berries. Apples. Oranges. Peaches. Apricots. Plums. Grapes. Bart. Papaya. Pomegranate. Kiwi. Cherries.  Vegetables  Lettuce. Spinach. Leafy greens, including kale, chard, nirav greens, and mustard greens. Beets. Cauliflower. Cabbage. Broccoli. Carrots. Green beans. Tomatoes. Peppers. Onions. Cucumbers. [place] sprouts.  Grains  Whole grains, such as whole-wheat or whole-grain bread, crackers, tortillas, cereal, and pasta. Unsweetened oatmeal. Quinoa. Brown or wild rice.  Meats and other proteins  Seafood. Poultry without skin. Lean cuts of poultry and beef. Tofu. Nuts. Seeds.  Dairy  Low-fat or fat-free dairy products such as milk, yogurt, and cheese.  The items listed above may not be a complete list of foods and beverages you can eat. Contact a dietitian for more information.  What foods should I avoid?  Fruits  Fruits canned with  syrup.  Vegetables  Canned vegetables. Frozen vegetables with butter or cream sauce.  Grains  Refined white flour and flour products such as bread, pasta, snack foods, and cereals. Avoid all processed foods.  Meats and other proteins  Fatty cuts of meat. Poultry with skin. Breaded or fried meats. Processed meat. Avoid saturated fats.  Dairy  Full-fat yogurt, cheese, or milk.  Beverages  Sweetened drinks, such as soda or iced tea.  The items listed above may not be a complete list of foods and beverages you should avoid. Contact a dietitian for more information.  Questions to ask a health care provider  · Do I need to meet with a diabetes educator?  · Do I need to meet with a dietitian?  · What number can I call if I have questions?  · When are the best times to check my blood glucose?  Where to find more information:  · American Diabetes Association: diabetes.org  · Academy of Nutrition and Dietetics: www.eatright.org  · National Cost of Diabetes and Digestive and Kidney Diseases: www.niddk.nih.gov  · Association of Diabetes Care and Education Specialists: www.diabeteseducator.org  Summary  · It is important to have healthy eating habits because your blood sugar (glucose) levels are greatly affected by what you eat and drink.  · A healthy meal plan will help you control your blood glucose and maintain a healthy lifestyle.  · Your health care provider may recommend that you work with a dietitian to make a meal plan that is best for you.  · Keep in mind that carbohydrates (carbs) and alcohol have immediate effects on your blood glucose levels. It is important to count carbs and to use alcohol carefully.  This information is not intended to replace advice given to you by your health care provider. Make sure you discuss any questions you have with your health care provider.  Document Revised: 11/24/2020 Document Reviewed: 11/24/2020  Elsevier Patient Education © 2021 [employer]

## 2021-04-21 LAB
ALBUMIN SERPL-MCNC: 4.3 G/DL (ref 3.5–5.2)
ALBUMIN/GLOB SERPL: 1.9 G/DL
ALP SERPL-CCNC: 123 U/L (ref 39–117)
ALT SERPL-CCNC: 30 U/L (ref 1–41)
AST SERPL-CCNC: 29 U/L (ref 1–40)
BILIRUB SERPL-MCNC: 0.7 MG/DL (ref 0–1.2)
BUN SERPL-MCNC: 20 MG/DL (ref 8–23)
BUN/CREAT SERPL: 19 (ref 7–25)
CALCIUM SERPL-MCNC: 9.4 MG/DL (ref 8.6–10.5)
CHLORIDE SERPL-SCNC: 104 MMOL/L (ref 98–107)
CHOLEST SERPL-MCNC: 165 MG/DL (ref 0–200)
CO2 SERPL-SCNC: 27.4 MMOL/L (ref 22–29)
CREAT SERPL-MCNC: 1.05 MG/DL (ref 0.76–1.27)
ERYTHROCYTE [DISTWIDTH] IN BLOOD BY AUTOMATED COUNT: 13.2 % (ref 12.3–15.4)
GLOBULIN SER CALC-MCNC: 2.3 GM/DL
GLUCOSE SERPL-MCNC: 115 MG/DL (ref 65–99)
HBA1C MFR BLD: 12.1 % (ref 4.8–5.6)
HCT VFR BLD AUTO: 46.8 % (ref 37.5–51)
HCV AB S/CO SERPL IA: <0.1 S/CO RATIO (ref 0–0.9)
HDLC SERPL-MCNC: 43 MG/DL (ref 40–60)
HGB BLD-MCNC: 15.5 G/DL (ref 13–17.7)
LDLC SERPL CALC-MCNC: 112 MG/DL (ref 0–100)
MCH RBC QN AUTO: 29.6 PG (ref 26.6–33)
MCHC RBC AUTO-ENTMCNC: 33.1 G/DL (ref 31.5–35.7)
MCV RBC AUTO: 89.3 FL (ref 79–97)
PLATELET # BLD AUTO: 173 10*3/MM3 (ref 140–450)
POTASSIUM SERPL-SCNC: 4 MMOL/L (ref 3.5–5.2)
PROT SERPL-MCNC: 6.6 G/DL (ref 6–8.5)
RBC # BLD AUTO: 5.24 10*6/MM3 (ref 4.14–5.8)
SODIUM SERPL-SCNC: 140 MMOL/L (ref 136–145)
T4 FREE SERPL-MCNC: 1.17 NG/DL (ref 0.93–1.7)
TRIGL SERPL-MCNC: 48 MG/DL (ref 0–150)
TSH SERPL DL<=0.005 MIU/L-ACNC: 4.77 UIU/ML (ref 0.27–4.2)
VLDLC SERPL CALC-MCNC: 10 MG/DL (ref 5–40)
WBC # BLD AUTO: 4.66 10*3/MM3 (ref 3.4–10.8)

## 2021-04-22 DIAGNOSIS — R73.09 ELEVATED HEMOGLOBIN A1C: ICD-10-CM

## 2021-04-22 DIAGNOSIS — E11.9 TYPE 2 DIABETES MELLITUS WITHOUT COMPLICATION, WITH LONG-TERM CURRENT USE OF INSULIN (HCC): Primary | ICD-10-CM

## 2021-04-22 DIAGNOSIS — Z79.4 TYPE 2 DIABETES MELLITUS WITHOUT COMPLICATION, WITH LONG-TERM CURRENT USE OF INSULIN (HCC): Primary | ICD-10-CM

## 2021-04-28 DIAGNOSIS — R73.09 ELEVATED HEMOGLOBIN A1C: ICD-10-CM

## 2021-04-28 DIAGNOSIS — Z79.4 TYPE 2 DIABETES MELLITUS WITHOUT COMPLICATION, WITH LONG-TERM CURRENT USE OF INSULIN (HCC): Primary | ICD-10-CM

## 2021-04-28 DIAGNOSIS — E11.9 TYPE 2 DIABETES MELLITUS WITHOUT COMPLICATION, WITH LONG-TERM CURRENT USE OF INSULIN (HCC): Primary | ICD-10-CM

## 2021-04-28 LAB — HBA1C MFR BLD: 11.5 % (ref 4.8–5.6)

## 2021-08-30 ENCOUNTER — OFFICE VISIT (OUTPATIENT)
Dept: ENDOCRINOLOGY | Age: 81
End: 2021-08-30

## 2021-08-30 VITALS
SYSTOLIC BLOOD PRESSURE: 110 MMHG | BODY MASS INDEX: 27.06 KG/M2 | HEIGHT: 66 IN | DIASTOLIC BLOOD PRESSURE: 64 MMHG | WEIGHT: 168.4 LBS

## 2021-08-30 DIAGNOSIS — E78.2 HYPERLIPEMIA, MIXED: ICD-10-CM

## 2021-08-30 DIAGNOSIS — E03.9 ACQUIRED HYPOTHYROIDISM: ICD-10-CM

## 2021-08-30 DIAGNOSIS — Z79.4 TYPE 2 DIABETES MELLITUS WITH HYPERGLYCEMIA, WITH LONG-TERM CURRENT USE OF INSULIN (HCC): Primary | ICD-10-CM

## 2021-08-30 DIAGNOSIS — E11.65 TYPE 2 DIABETES MELLITUS WITH HYPERGLYCEMIA, WITH LONG-TERM CURRENT USE OF INSULIN (HCC): Primary | ICD-10-CM

## 2021-08-30 PROCEDURE — 99204 OFFICE O/P NEW MOD 45 MIN: CPT | Performed by: INTERNAL MEDICINE

## 2021-08-30 NOTE — PROGRESS NOTES
"Chief Complaint  Diabetes    Subjective          History of Present Illness    Bennie Greene 80 y.o. presents with Type 2 dm as a new patient. Consulted by Dr. Shah    Type 2 dm - Diagnosed about 6 years ago.   Today in clinic pt reports being on metformin - 500 mg po bid, tresiba 30 units in the am.   Avg bg -150 mg/dl.  Checks BG - once a day.   Sensor -x   Dm retinopathy - no hx,Last eye exam - 8/27/21  Dm nephropathy - x  Dm neuropathy - x,Dm neuropathy meds -   CAD - x  CVA - x  Episodes of hypoglycemia - x  Pt is physically active. weight has been stable.   Pt tries to follow DM diet for most part.   On Ace inb.    No recent steroid use at around the time of September 2020 to April 2021 but definitely noted a significant jump in the A1c.    Hypothyroidism  On levothyroxine 50 mcg oral daily.              Review      I have reviewed the patient's allergies, medicines, past medical hx, family hx and social hx in detail.    Objective   Vital Signs:   /64   Ht 167.6 cm (66\")   Wt 76.4 kg (168 lb 6.4 oz)   BMI 27.18 kg/m²   Physical Exam   General appearance - no distress  Eyes- anicteric sclera  Ear nose and throat-external ears and nose normal.    Respiratory-normal chest on inspection.  No respiratory distress noted.  Skin-no rashes.  Neuro-alert and oriented x3            Result Review :   The following data was reviewed by: Grecia Parham MD on 08/30/2021:  Orders Only on 04/27/2021   Component Date Value Ref Range Status   • Hemoglobin A1C 04/27/2021 11.5* 4.8 - 5.6 % Final    Comment:          Prediabetes: 5.7 - 6.4           Diabetes: >6.4           Glycemic control for adults with diabetes: <7.0       Data reviewed: referral documentation        Results Review:    I reviewed the patient's new clinical results.     Assessment and Plan    Problem List Items Addressed This Visit        Other    Acquired hypothyroidism (Chronic)    Relevant Orders    Hemoglobin A1c    TSH    Basic Metabolic Panel "    T4, Free    Basic Metabolic Panel    Hemoglobin A1c    Lipid Panel    TSH    T4, Free    Microalbumin / Creatinine Urine Ratio - Urine, Clean Catch    C-Peptide    Anti-islet Cell Antibody    Glutamic Acid Decarboxylase    Insulin Antibody      Other Visit Diagnoses     Type 2 diabetes mellitus with hyperglycemia, with long-term current use of insulin (CMS/[facility])    -  Primary    Relevant Orders    Hemoglobin A1c    TSH    Basic Metabolic Panel    T4, Free    Basic Metabolic Panel    Hemoglobin A1c    Lipid Panel    TSH    T4, Free    Microalbumin / Creatinine Urine Ratio - Urine, Clean Catch    C-Peptide    Anti-islet Cell Antibody    Glutamic Acid Decarboxylase    Insulin Antibody    Hyperlipemia, mixed        Relevant Orders    Hemoglobin A1c    TSH    Basic Metabolic Panel    T4, Free    Basic Metabolic Panel    Hemoglobin A1c    Lipid Panel    TSH    T4, Free    Microalbumin / Creatinine Urine Ratio - Urine, Clean Catch    C-Peptide    Anti-islet Cell Antibody    Glutamic Acid Decarboxylase    Insulin Antibody        Type 2 diabetes mellitus-uncontrolled with severely elevated blood sugars  Check HbA1c  Rule out type 1 diabetes by checking C-peptide levels, phil 65, insulin antibodies etc.  Adjust oral hypoglycemic agents or insulin based on the work-up     Placed CGM on the patient to further evaluate the blood glucose trends.        Hypothyroidism  Check thyroid panel  Adjust the dosage of levothyroxine based on the work-up.    Interpreted the blood work-up/imaging results performed by the primary care/consulting physician -    Refills sent to pharmacy    Follow Up     Patient was given instructions and counseling regarding her condition or for health maintenance advice. Please see specific information pulled into the AVS if appropriate.       Thank you for asking me to see your patient, Bennie Greene in consultation.         Grecia Parham MD  08/30/21      EMR Dragon / transcription disclaimer:    "  \"Dictated utilizing Dragon dictation\".         "

## 2021-09-05 LAB
BUN SERPL-MCNC: 18 MG/DL (ref 8–23)
BUN/CREAT SERPL: 18.6 (ref 7–25)
C PEPTIDE SERPL-MCNC: 2.4 NG/ML (ref 1.1–4.4)
CALCIUM SERPL-MCNC: 9.3 MG/DL (ref 8.6–10.5)
CHLORIDE SERPL-SCNC: 102 MMOL/L (ref 98–107)
CO2 SERPL-SCNC: 27.5 MMOL/L (ref 22–29)
CREAT SERPL-MCNC: 0.97 MG/DL (ref 0.76–1.27)
GAD65 AB SER IA-ACNC: <5 U/ML (ref 0–5)
GLUCOSE SERPL-MCNC: 237 MG/DL (ref 65–99)
HBA1C MFR BLD: 11.1 % (ref 4.8–5.6)
INSULIN AB SER-ACNC: <5 UU/ML
PANC ISLET CELL AB TITR SER: NEGATIVE {TITER}
POTASSIUM SERPL-SCNC: 4.7 MMOL/L (ref 3.5–5.2)
SODIUM SERPL-SCNC: 141 MMOL/L (ref 136–145)
T4 FREE SERPL-MCNC: 1.23 NG/DL (ref 0.93–1.7)
TSH SERPL DL<=0.005 MIU/L-ACNC: 2.9 UIU/ML (ref 0.27–4.2)

## 2021-09-13 ENCOUNTER — TREATMENT (OUTPATIENT)
Dept: ENDOCRINOLOGY | Age: 81
End: 2021-09-13

## 2021-09-13 DIAGNOSIS — E11.9 TYPE 2 DIABETES MELLITUS WITHOUT COMPLICATION, WITH LONG-TERM CURRENT USE OF INSULIN (HCC): Chronic | ICD-10-CM

## 2021-09-13 DIAGNOSIS — Z79.4 TYPE 2 DIABETES MELLITUS WITHOUT COMPLICATION, WITH LONG-TERM CURRENT USE OF INSULIN (HCC): Chronic | ICD-10-CM

## 2021-09-13 NOTE — PROGRESS NOTES
Patient was in office today A placement of a CGM Freestyle Elly Pro CGM was place on back of patient arm covered with tegaderm this was done on 8/30/21. Verbal instruction was given for care CGM and Patient was instructed to bring the CGM back in 14 days of placement    Continues glucose monitoring data      Patient wore continuous glucose monitoring-free style elly Pro from 8/30-September 10  Average blood glucose reading was 240 mg/dL range  Estimated HbA1c 9.1%  Likelihood of low blood glucose levels was low  Likelihood of high blood glucose levels was very high  Blood glucose trends showed high blood sugars    Current treatment regimen  Tresiba 35 units daily  Metformin 500 mg twice daily    Changes to the treatment regimen  Increase Tresiba to 40 units daily  Continue Metformin 500 mg twice daily  Add Trulicity 1.5 mg subcutaneous once a week.    Please let the patient know about these changes and send in the prescription of these medications.

## 2021-09-15 DIAGNOSIS — Z79.4 TYPE 2 DIABETES MELLITUS WITHOUT COMPLICATION, WITH LONG-TERM CURRENT USE OF INSULIN (HCC): Primary | ICD-10-CM

## 2021-09-15 DIAGNOSIS — E11.9 TYPE 2 DIABETES MELLITUS WITHOUT COMPLICATION, WITH LONG-TERM CURRENT USE OF INSULIN (HCC): Primary | ICD-10-CM

## 2021-10-07 DIAGNOSIS — Z79.4 TYPE 2 DIABETES MELLITUS WITHOUT COMPLICATION, WITH LONG-TERM CURRENT USE OF INSULIN (HCC): ICD-10-CM

## 2021-10-07 DIAGNOSIS — E11.9 TYPE 2 DIABETES MELLITUS WITHOUT COMPLICATION, WITH LONG-TERM CURRENT USE OF INSULIN (HCC): ICD-10-CM

## 2021-10-07 RX ORDER — METFORMIN HYDROCHLORIDE 500 MG/1
1000 TABLET, EXTENDED RELEASE ORAL
Qty: 180 TABLET | Refills: 3 | OUTPATIENT
Start: 2021-10-07

## 2021-10-08 DIAGNOSIS — E07.9 DISORDER OF THYROID, UNSPECIFIED: ICD-10-CM

## 2021-10-08 RX ORDER — LEVOTHYROXINE SODIUM 0.05 MG/1
TABLET ORAL
Qty: 90 TABLET | Refills: 3 | OUTPATIENT
Start: 2021-10-08

## 2021-10-29 ENCOUNTER — OFFICE VISIT (OUTPATIENT)
Dept: INTERNAL MEDICINE | Facility: CLINIC | Age: 81
End: 2021-10-29

## 2021-10-29 VITALS
HEART RATE: 59 BPM | WEIGHT: 175.6 LBS | HEIGHT: 66 IN | RESPIRATION RATE: 16 BRPM | TEMPERATURE: 97.3 F | SYSTOLIC BLOOD PRESSURE: 123 MMHG | BODY MASS INDEX: 28.22 KG/M2 | DIASTOLIC BLOOD PRESSURE: 64 MMHG | OXYGEN SATURATION: 94 %

## 2021-10-29 DIAGNOSIS — Z23 NEED FOR INFLUENZA VACCINATION: ICD-10-CM

## 2021-10-29 DIAGNOSIS — E78.49 OTHER HYPERLIPIDEMIA: ICD-10-CM

## 2021-10-29 DIAGNOSIS — Z79.4 TYPE 2 DIABETES MELLITUS WITH HYPERGLYCEMIA, WITH LONG-TERM CURRENT USE OF INSULIN (HCC): Primary | ICD-10-CM

## 2021-10-29 DIAGNOSIS — E03.9 ACQUIRED HYPOTHYROIDISM: ICD-10-CM

## 2021-10-29 DIAGNOSIS — E11.65 TYPE 2 DIABETES MELLITUS WITH HYPERGLYCEMIA, WITH LONG-TERM CURRENT USE OF INSULIN (HCC): Primary | ICD-10-CM

## 2021-10-29 PROCEDURE — G0008 ADMIN INFLUENZA VIRUS VAC: HCPCS | Performed by: FAMILY MEDICINE

## 2021-10-29 PROCEDURE — 99214 OFFICE O/P EST MOD 30 MIN: CPT | Performed by: FAMILY MEDICINE

## 2021-10-29 PROCEDURE — 90662 IIV NO PRSV INCREASED AG IM: CPT | Performed by: FAMILY MEDICINE

## 2021-10-29 NOTE — PATIENT INSTRUCTIONS
Diabetes Mellitus and Nutrition, Adult  When you have diabetes, or diabetes mellitus, it is very important to have healthy eating habits because your blood sugar (glucose) levels are greatly affected by what you eat and drink. Eating healthy foods in the right amounts, at about the same times every day, can help you:  · Control your blood glucose.  · Lower your risk of heart disease.  · Improve your blood pressure.  · Reach or maintain a healthy weight.  What can affect my meal plan?  Every person with diabetes is different, and each person has different needs for a meal plan. Your health care provider may recommend that you work with a dietitian to make a meal plan that is best for you. Your meal plan may vary depending on factors such as:  · The calories you need.  · The medicines you take.  · Your weight.  · Your blood glucose, blood pressure, and cholesterol levels.  · Your activity level.  · Other health conditions you have, such as heart or kidney disease.  How do carbohydrates affect me?  Carbohydrates, also called carbs, affect your blood glucose level more than any other type of food. Eating carbs naturally raises the amount of glucose in your blood. Carb counting is a method for keeping track of how many carbs you eat. Counting carbs is important to keep your blood glucose at a healthy level, especially if you use insulin or take certain oral diabetes medicines.  It is important to know how many carbs you can safely have in each meal. This is different for every person. Your dietitian can help you calculate how many carbs you should have at each meal and for each snack.  How does alcohol affect me?  Alcohol can cause a sudden decrease in blood glucose (hypoglycemia), especially if you use insulin or take certain oral diabetes medicines. Hypoglycemia can be a life-threatening condition. Symptoms of hypoglycemia, such as sleepiness, dizziness, and confusion, are similar to symptoms of having too much  "alcohol.  · Do not drink alcohol if:  ? Your health care provider tells you not to drink.  ? You are pregnant, may be pregnant, or are planning to become pregnant.  · If you drink alcohol:  ? Do not drink on an empty stomach.  ? Limit how much you use to:  § 0-1 drink a day for women.  § 0-2 drinks a day for men.  ? Be aware of how much alcohol is in your drink. In the [place] one drink equals one 12 oz bottle of beer (355 mL), one 5 oz glass of wine (148 mL), or one 1½ oz glass of hard liquor (44 mL).  ? Keep yourself hydrated with water, diet soda, or unsweetened iced tea.  § Keep in mind that regular soda, juice, and other mixers may contain a lot of sugar and must be counted as carbs.  What are tips for following this plan?    Reading food labels  · Start by checking the serving size on the \"Nutrition Facts\" label of packaged foods and drinks. The amount of calories, carbs, fats, and other nutrients listed on the label is based on one serving of the item. Many items contain more than one serving per package.  · Check the total grams (g) of carbs in one serving. You can calculate the number of servings of carbs in one serving by dividing the total carbs by 15. For example, if a food has 30 g of total carbs per serving, it would be equal to 2 servings of carbs.  · Check the number of grams (g) of saturated fats and trans fats in one serving. Choose foods that have a low amount or none of these fats.  · Check the number of milligrams (mg) of salt (sodium) in one serving. Most people should limit total sodium intake to less than 2,300 mg per day.  · Always check the nutrition information of foods labeled as \"low-fat\" or \"nonfat.\" These foods may be higher in added sugar or refined carbs and should be avoided.  · Talk to your dietitian to identify your daily goals for nutrients listed on the label.  Shopping  · Avoid buying canned, pre-made, or processed foods. These foods tend to be high in fat, sodium, and added " sugar.  · Shop around the outside edge of the grocery store. This is where you will most often find fresh fruits and vegetables, bulk grains, fresh meats, and fresh dairy.  Cooking  · Use low-heat cooking methods, such as baking, instead of high-heat cooking methods like deep frying.  · Cook using healthy oils, such as olive, canola, or sunflower oil.  · Avoid cooking with butter, cream, or high-fat meats.  Meal planning  · Eat meals and snacks regularly, preferably at the same times every day. Avoid going long periods of time without eating.  · Eat foods that are high in fiber, such as fresh fruits, vegetables, beans, and whole grains. Talk with your dietitian about how many servings of carbs you can eat at each meal.  · Eat 4-6 oz (112-168 g) of lean protein each day, such as lean meat, chicken, fish, eggs, or tofu. One ounce (oz) of lean protein is equal to:  ? 1 oz (28 g) of meat, chicken, or fish.  ? 1 egg.  ? ¼ cup (62 g) of tofu.  · Eat some foods each day that contain healthy fats, such as avocado, nuts, seeds, and fish.  What foods should I eat?  Fruits  Berries. Apples. Oranges. Peaches. Apricots. Plums. Grapes. Bart. Papaya. Pomegranate. Kiwi. Cherries.  Vegetables  Lettuce. Spinach. Leafy greens, including kale, chard, nirav greens, and mustard greens. Beets. Cauliflower. Cabbage. Broccoli. Carrots. Green beans. Tomatoes. Peppers. Onions. Cucumbers. [place] sprouts.  Grains  Whole grains, such as whole-wheat or whole-grain bread, crackers, tortillas, cereal, and pasta. Unsweetened oatmeal. Quinoa. Brown or wild rice.  Meats and other proteins  Seafood. Poultry without skin. Lean cuts of poultry and beef. Tofu. Nuts. Seeds.  Dairy  Low-fat or fat-free dairy products such as milk, yogurt, and cheese.  The items listed above may not be a complete list of foods and beverages you can eat. Contact a dietitian for more information.  What foods should I avoid?  Fruits  Fruits canned with  syrup.  Vegetables  Canned vegetables. Frozen vegetables with butter or cream sauce.  Grains  Refined white flour and flour products such as bread, pasta, snack foods, and cereals. Avoid all processed foods.  Meats and other proteins  Fatty cuts of meat. Poultry with skin. Breaded or fried meats. Processed meat. Avoid saturated fats.  Dairy  Full-fat yogurt, cheese, or milk.  Beverages  Sweetened drinks, such as soda or iced tea.  The items listed above may not be a complete list of foods and beverages you should avoid. Contact a dietitian for more information.  Questions to ask a health care provider  · Do I need to meet with a diabetes educator?  · Do I need to meet with a dietitian?  · What number can I call if I have questions?  · When are the best times to check my blood glucose?  Where to find more information:  · American Diabetes Association: diabetes.org  · Academy of Nutrition and Dietetics: www.eatright.org  · National [place] of Diabetes and Digestive and Kidney Diseases: www.niddk.nih.gov  · Association of Diabetes Care and Education Specialists: www.diabeteseducator.org  Summary  · It is important to have healthy eating habits because your blood sugar (glucose) levels are greatly affected by what you eat and drink.  · A healthy meal plan will help you control your blood glucose and maintain a healthy lifestyle.  · Your health care provider may recommend that you work with a dietitian to make a meal plan that is best for you.  · Keep in mind that carbohydrates (carbs) and alcohol have immediate effects on your blood glucose levels. It is important to count carbs and to use alcohol carefully.  This information is not intended to replace advice given to you by your health care provider. Make sure you discuss any questions you have with your health care provider.  Document Revised: 11/24/2020 Document Reviewed: 11/24/2020  Elsevier Patient Education © 2021 [employer]

## 2021-10-29 NOTE — PROGRESS NOTES
"Chief Complaint  Diabetes, Hyperlipidemia, and Hypothyroidism    Subjective          Bennie Greene presents to [facility]  History of Present Illness     Diabetes mellitus-improving.  Currently seeing endocrinology due to the markedly elevated A1c over this year.  Reviewed note from Dr. Parham on 8/30/21.  No new numbness, tingling.  Patient is taking metformin XR 1000mg, Teresiba 30 units as prescribed.  No side effects.  Last A1c was 11.10.  Running around 79 - low 100.     HLD-stable.  Patient taking no meds and is diet controlled.  Trying to adhere to a low-fat diet.     Hypothyroidism - controlled based on last TSH 2.9 and free t4 1.23.  Patient taking levothyroxine 50mcg.  Patient denying any symptoms of hypothyroidism such as cold intolerance, constipation, mood swings.      Objective   Vital Signs:   /64 (BP Location: Right arm, Patient Position: Sitting, Cuff Size: Adult)   Pulse 59   Temp 97.3 °F (36.3 °C) (Temporal)   Resp 16   Ht 167.6 cm (66\")   Wt 79.7 kg (175 lb 9.6 oz)   SpO2 94%   BMI 28.34 kg/m²     Physical Exam  Vitals and nursing note reviewed.   Constitutional:       General: He is not in acute distress.     Appearance: Normal appearance.   Cardiovascular:      Rate and Rhythm: Normal rate and regular rhythm.      Heart sounds: Normal heart sounds. No murmur heard.      Pulmonary:      Effort: Pulmonary effort is normal.      Breath sounds: Normal breath sounds.   Neurological:      Mental Status: He is alert.        Result Review :   The following data was reviewed by: Travon Shah MD on 10/29/2021:  Common labs    Common Labsle 4/16/21 4/16/21 4/16/21 4/16/21 4/27/21 8/30/21 8/30/21    0911 0911 0911 0911  1235 1235   Glucose  115 (A)     237 (A)   BUN  20     18   Creatinine  1.05     0.97   eGFR Non  Am  68     75   eGFR African Am  83     90   Sodium  140     141   Potassium  4.0     4.7   Chloride  104     102   Calcium  9.4     9.3 "   Total Protein  6.6        Albumin  4.30        Total Bilirubin  0.7        Alkaline Phosphatase  123 (A)        AST (SGOT)  29        ALT (SGPT)  30        WBC 4.66         Hemoglobin 15.5         Hematocrit 46.8         Platelets 173         Total Cholesterol    165      Triglycerides    48      HDL Cholesterol    43      LDL Cholesterol     112 (A)      Hemoglobin A1C   12.10 (A) (C)  11.5 (A) 11.10 (A)    (A) Abnormal value (C) Corrected value       Comments are available for some flowsheets but are not being displayed.                     Assessment and Plan    Diagnoses and all orders for this visit:    1. Type 2 diabetes mellitus with hyperglycemia, with long-term current use of insulin (HCC) (Primary)    2. Acquired hypothyroidism    3. Other hyperlipidemia    4. Need for influenza vaccination  -     Fluzone High-Dose 65+yrs ([id number])      Reviewed labs from endocrine as above and can use those.  I would recommend him continue all of his medications currently.  He says he is not can take the Trulicity and given that his blood sugars are already running in the 70s to low 100s, I would say that would be okay for now.  However he should let endocrine know about that change.    Continue all other medications as above.        Follow Up   Return in about 6 months (around 5/2/2022) for Medicare Wellness.  Patient was given instructions and counseling regarding his condition or for health maintenance advice. Please see specific information pulled into the AVS if appropriate.

## 2021-10-31 ENCOUNTER — HOSPITAL ENCOUNTER (OUTPATIENT)
Facility: HOSPITAL | Age: 81
Setting detail: OBSERVATION
Discharge: HOME | End: 2021-11-01
Attending: EMERGENCY MEDICINE | Admitting: INTERNAL MEDICINE

## 2021-10-31 ENCOUNTER — APPOINTMENT (OUTPATIENT)
Dept: GENERAL RADIOLOGY | Facility: HOSPITAL | Age: 81
End: 2021-10-31

## 2021-10-31 ENCOUNTER — APPOINTMENT (OUTPATIENT)
Dept: CT IMAGING | Facility: HOSPITAL | Age: 81
End: 2021-10-31

## 2021-10-31 ENCOUNTER — APPOINTMENT (OUTPATIENT)
Dept: MRI IMAGING | Facility: HOSPITAL | Age: 81
End: 2021-10-31

## 2021-10-31 DIAGNOSIS — S62.235A CLOSED NONDISPLACED FRACTURE OF BASE OF FIRST METACARPAL BONE OF LEFT HAND, UNSPECIFIED FRACTURE MORPHOLOGY, INITIAL ENCOUNTER: ICD-10-CM

## 2021-10-31 DIAGNOSIS — S09.90XA INJURY OF HEAD, INITIAL ENCOUNTER: ICD-10-CM

## 2021-10-31 DIAGNOSIS — S16.1XXA STRAIN OF NECK MUSCLE, INITIAL ENCOUNTER: ICD-10-CM

## 2021-10-31 DIAGNOSIS — R55 SYNCOPE AND COLLAPSE: Primary | ICD-10-CM

## 2021-10-31 DIAGNOSIS — S12.391A OTHER CLOSED NONDISPLACED FRACTURE OF FOURTH CERVICAL VERTEBRA, INITIAL ENCOUNTER (HCC): ICD-10-CM

## 2021-10-31 DIAGNOSIS — T07.XXXA MULTIPLE ABRASIONS: ICD-10-CM

## 2021-10-31 DIAGNOSIS — S12.301A CLOSED NONDISPLACED FRACTURE OF FOURTH CERVICAL VERTEBRA, UNSPECIFIED FRACTURE MORPHOLOGY, INITIAL ENCOUNTER (HCC): ICD-10-CM

## 2021-10-31 PROBLEM — S92.352A FRACTURE OF FIFTH METATARSAL BONE OF LEFT FOOT: Status: ACTIVE | Noted: 2021-10-31

## 2021-10-31 PROBLEM — S62.509A THUMB FRACTURE: Status: ACTIVE | Noted: 2021-10-31

## 2021-10-31 PROBLEM — S12.300A C4 CERVICAL FRACTURE: Status: ACTIVE | Noted: 2021-10-31

## 2021-10-31 LAB
ALBUMIN SERPL-MCNC: 4.1 G/DL (ref 3.5–5.2)
ALBUMIN/GLOB SERPL: 1.5 G/DL
ALP SERPL-CCNC: 132 U/L (ref 39–117)
ALT SERPL W P-5'-P-CCNC: 21 U/L (ref 1–41)
ANION GAP SERPL CALCULATED.3IONS-SCNC: 8.3 MMOL/L (ref 5–15)
ANION GAP SERPL CALCULATED.3IONS-SCNC: 9.8 MMOL/L (ref 5–15)
AST SERPL-CCNC: 26 U/L (ref 1–40)
BACTERIA UR QL AUTO: NORMAL /HPF
BASOPHILS # BLD AUTO: 0.05 10*3/MM3 (ref 0–0.2)
BASOPHILS NFR BLD AUTO: 0.6 % (ref 0–1.5)
BILIRUB SERPL-MCNC: 0.4 MG/DL (ref 0–1.2)
BILIRUB UR QL STRIP: NEGATIVE
BUN SERPL-MCNC: 15 MG/DL (ref 8–23)
BUN SERPL-MCNC: 18 MG/DL (ref 8–23)
BUN/CREAT SERPL: 16.2 (ref 7–25)
BUN/CREAT SERPL: 17 (ref 7–25)
CALCIUM SPEC-SCNC: 8.5 MG/DL (ref 8.6–10.5)
CALCIUM SPEC-SCNC: 8.7 MG/DL (ref 8.6–10.5)
CHLORIDE SERPL-SCNC: 105 MMOL/L (ref 98–107)
CHLORIDE SERPL-SCNC: 105 MMOL/L (ref 98–107)
CLARITY UR: CLEAR
CO2 SERPL-SCNC: 23.2 MMOL/L (ref 22–29)
CO2 SERPL-SCNC: 25.7 MMOL/L (ref 22–29)
COLOR UR: YELLOW
CREAT SERPL-MCNC: 0.88 MG/DL (ref 0.76–1.27)
CREAT SERPL-MCNC: 1.11 MG/DL (ref 0.76–1.27)
DEPRECATED RDW RBC AUTO: 41.2 FL (ref 37–54)
DEPRECATED RDW RBC AUTO: 44.1 FL (ref 37–54)
EOSINOPHIL # BLD AUTO: 0.15 10*3/MM3 (ref 0–0.4)
EOSINOPHIL NFR BLD AUTO: 1.9 % (ref 0.3–6.2)
ERYTHROCYTE [DISTWIDTH] IN BLOOD BY AUTOMATED COUNT: 13 % (ref 12.3–15.4)
ERYTHROCYTE [DISTWIDTH] IN BLOOD BY AUTOMATED COUNT: 13.4 % (ref 12.3–15.4)
GFR SERPL CREATININE-BSD FRML MDRD: 64 ML/MIN/1.73
GFR SERPL CREATININE-BSD FRML MDRD: 83 ML/MIN/1.73
GLOBULIN UR ELPH-MCNC: 2.7 GM/DL
GLUCOSE BLDC GLUCOMTR-MCNC: 197 MG/DL (ref 70–130)
GLUCOSE BLDC GLUCOMTR-MCNC: 260 MG/DL (ref 70–130)
GLUCOSE BLDC GLUCOMTR-MCNC: 289 MG/DL (ref 70–130)
GLUCOSE BLDC GLUCOMTR-MCNC: 99 MG/DL (ref 70–130)
GLUCOSE SERPL-MCNC: 227 MG/DL (ref 65–99)
GLUCOSE SERPL-MCNC: 233 MG/DL (ref 65–99)
GLUCOSE UR STRIP-MCNC: ABNORMAL MG/DL
HCT VFR BLD AUTO: 42.5 % (ref 37.5–51)
HCT VFR BLD AUTO: 44.2 % (ref 37.5–51)
HGB BLD-MCNC: 14.4 G/DL (ref 13–17.7)
HGB BLD-MCNC: 14.7 G/DL (ref 13–17.7)
HGB UR QL STRIP.AUTO: ABNORMAL
HYALINE CASTS UR QL AUTO: NORMAL /LPF
IMM GRANULOCYTES # BLD AUTO: 0.02 10*3/MM3 (ref 0–0.05)
IMM GRANULOCYTES NFR BLD AUTO: 0.2 % (ref 0–0.5)
KETONES UR QL STRIP: NEGATIVE
LEUKOCYTE ESTERASE UR QL STRIP.AUTO: NEGATIVE
LYMPHOCYTES # BLD AUTO: 0.95 10*3/MM3 (ref 0.7–3.1)
LYMPHOCYTES NFR BLD AUTO: 11.8 % (ref 19.6–45.3)
MCH RBC QN AUTO: 29.3 PG (ref 26.6–33)
MCH RBC QN AUTO: 29.4 PG (ref 26.6–33)
MCHC RBC AUTO-ENTMCNC: 33.3 G/DL (ref 31.5–35.7)
MCHC RBC AUTO-ENTMCNC: 33.9 G/DL (ref 31.5–35.7)
MCV RBC AUTO: 86.7 FL (ref 79–97)
MCV RBC AUTO: 88.2 FL (ref 79–97)
MONOCYTES # BLD AUTO: 0.67 10*3/MM3 (ref 0.1–0.9)
MONOCYTES NFR BLD AUTO: 8.4 % (ref 5–12)
NEUTROPHILS NFR BLD AUTO: 6.18 10*3/MM3 (ref 1.7–7)
NEUTROPHILS NFR BLD AUTO: 77.1 % (ref 42.7–76)
NITRITE UR QL STRIP: NEGATIVE
NRBC BLD AUTO-RTO: 0 /100 WBC (ref 0–0.2)
PH UR STRIP.AUTO: 6 [PH] (ref 5–8)
PLATELET # BLD AUTO: 172 10*3/MM3 (ref 140–450)
PLATELET # BLD AUTO: 180 10*3/MM3 (ref 140–450)
PMV BLD AUTO: 10.5 FL (ref 6–12)
PMV BLD AUTO: 10.6 FL (ref 6–12)
POTASSIUM SERPL-SCNC: 3.9 MMOL/L (ref 3.5–5.2)
POTASSIUM SERPL-SCNC: 4.4 MMOL/L (ref 3.5–5.2)
PROT SERPL-MCNC: 6.8 G/DL (ref 6–8.5)
PROT UR QL STRIP: NEGATIVE
QT INTERVAL: 446 MS
RBC # BLD AUTO: 4.9 10*6/MM3 (ref 4.14–5.8)
RBC # BLD AUTO: 5.01 10*6/MM3 (ref 4.14–5.8)
RBC # UR: NORMAL /HPF
REF LAB TEST METHOD: NORMAL
SARS-COV-2 RNA RESP QL NAA+PROBE: NOT DETECTED
SODIUM SERPL-SCNC: 138 MMOL/L (ref 136–145)
SODIUM SERPL-SCNC: 139 MMOL/L (ref 136–145)
SP GR UR STRIP: 1.01 (ref 1–1.03)
SQUAMOUS #/AREA URNS HPF: NORMAL /HPF
TROPONIN T SERPL-MCNC: <0.01 NG/ML (ref 0–0.03)
UROBILINOGEN UR QL STRIP: ABNORMAL
WBC # BLD AUTO: 8.02 10*3/MM3 (ref 3.4–10.8)
WBC # BLD AUTO: 9.4 10*3/MM3 (ref 3.4–10.8)
WBC UR QL AUTO: NORMAL /HPF

## 2021-10-31 PROCEDURE — 85027 COMPLETE CBC AUTOMATED: CPT

## 2021-10-31 PROCEDURE — 63710000001 INSULIN GLARGINE PER 5 UNITS: Performed by: NURSE PRACTITIONER

## 2021-10-31 PROCEDURE — G0378 HOSPITAL OBSERVATION PER HR: HCPCS

## 2021-10-31 PROCEDURE — 0 IOPAMIDOL PER 1 ML: Performed by: INTERNAL MEDICINE

## 2021-10-31 PROCEDURE — C9803 HOPD COVID-19 SPEC COLLECT: HCPCS

## 2021-10-31 PROCEDURE — 96374 THER/PROPH/DIAG INJ IV PUSH: CPT

## 2021-10-31 PROCEDURE — 72141 MRI NECK SPINE W/O DYE: CPT

## 2021-10-31 PROCEDURE — 99204 OFFICE O/P NEW MOD 45 MIN: CPT | Performed by: INTERNAL MEDICINE

## 2021-10-31 PROCEDURE — U0003 INFECTIOUS AGENT DETECTION BY NUCLEIC ACID (DNA OR RNA); SEVERE ACUTE RESPIRATORY SYNDROME CORONAVIRUS 2 (SARS-COV-2) (CORONAVIRUS DISEASE [COVID-19]), AMPLIFIED PROBE TECHNIQUE, MAKING USE OF HIGH THROUGHPUT TECHNOLOGIES AS DESCRIBED BY CMS-2020-01-R: HCPCS | Performed by: PHYSICIAN ASSISTANT

## 2021-10-31 PROCEDURE — 93005 ELECTROCARDIOGRAM TRACING: CPT | Performed by: PHYSICIAN ASSISTANT

## 2021-10-31 PROCEDURE — 73200 CT UPPER EXTREMITY W/O DYE: CPT

## 2021-10-31 PROCEDURE — 99284 EMERGENCY DEPT VISIT MOD MDM: CPT

## 2021-10-31 PROCEDURE — 72125 CT NECK SPINE W/O DYE: CPT

## 2021-10-31 PROCEDURE — 73130 X-RAY EXAM OF HAND: CPT

## 2021-10-31 PROCEDURE — 93010 ELECTROCARDIOGRAM REPORT: CPT | Performed by: INTERNAL MEDICINE

## 2021-10-31 PROCEDURE — 71045 X-RAY EXAM CHEST 1 VIEW: CPT

## 2021-10-31 PROCEDURE — 73502 X-RAY EXAM HIP UNI 2-3 VIEWS: CPT

## 2021-10-31 PROCEDURE — 85025 COMPLETE CBC W/AUTO DIFF WBC: CPT | Performed by: PHYSICIAN ASSISTANT

## 2021-10-31 PROCEDURE — 70450 CT HEAD/BRAIN W/O DYE: CPT

## 2021-10-31 PROCEDURE — 81001 URINALYSIS AUTO W/SCOPE: CPT | Performed by: PHYSICIAN ASSISTANT

## 2021-10-31 PROCEDURE — 25010000002 HYDRALAZINE PER 20 MG: Performed by: PHYSICIAN ASSISTANT

## 2021-10-31 PROCEDURE — 82962 GLUCOSE BLOOD TEST: CPT

## 2021-10-31 PROCEDURE — 70498 CT ANGIOGRAPHY NECK: CPT

## 2021-10-31 PROCEDURE — 63710000001 INSULIN LISPRO (HUMAN) PER 5 UNITS

## 2021-10-31 PROCEDURE — U0005 INFEC AGEN DETEC AMPLI PROBE: HCPCS | Performed by: PHYSICIAN ASSISTANT

## 2021-10-31 PROCEDURE — 84484 ASSAY OF TROPONIN QUANT: CPT | Performed by: PHYSICIAN ASSISTANT

## 2021-10-31 PROCEDURE — 99204 OFFICE O/P NEW MOD 45 MIN: CPT | Performed by: NEUROLOGICAL SURGERY

## 2021-10-31 PROCEDURE — 80053 COMPREHEN METABOLIC PANEL: CPT | Performed by: PHYSICIAN ASSISTANT

## 2021-10-31 RX ORDER — HYDROCODONE BITARTRATE AND ACETAMINOPHEN 5; 325 MG/1; MG/1
1 TABLET ORAL EVERY 6 HOURS PRN
Status: DISCONTINUED | OUTPATIENT
Start: 2021-10-31 | End: 2021-11-01 | Stop reason: HOSPADM

## 2021-10-31 RX ORDER — INSULIN LISPRO 100 [IU]/ML
0-9 INJECTION, SOLUTION INTRAVENOUS; SUBCUTANEOUS
Status: DISCONTINUED | OUTPATIENT
Start: 2021-10-31 | End: 2021-11-01 | Stop reason: HOSPADM

## 2021-10-31 RX ORDER — NICOTINE POLACRILEX 4 MG
15 LOZENGE BUCCAL
Status: DISCONTINUED | OUTPATIENT
Start: 2021-10-31 | End: 2021-11-01 | Stop reason: HOSPADM

## 2021-10-31 RX ORDER — NITROGLYCERIN 0.4 MG/1
0.4 TABLET SUBLINGUAL
Status: DISCONTINUED | OUTPATIENT
Start: 2021-10-31 | End: 2021-11-01 | Stop reason: HOSPADM

## 2021-10-31 RX ORDER — SODIUM CHLORIDE 0.9 % (FLUSH) 0.9 %
10 SYRINGE (ML) INJECTION AS NEEDED
Status: DISCONTINUED | OUTPATIENT
Start: 2021-10-31 | End: 2021-11-01 | Stop reason: HOSPADM

## 2021-10-31 RX ORDER — UREA 10 %
3 LOTION (ML) TOPICAL NIGHTLY PRN
Status: DISCONTINUED | OUTPATIENT
Start: 2021-10-31 | End: 2021-11-01 | Stop reason: HOSPADM

## 2021-10-31 RX ORDER — LEVOTHYROXINE SODIUM 0.05 MG/1
50 TABLET ORAL DAILY
Status: DISCONTINUED | OUTPATIENT
Start: 2021-10-31 | End: 2021-11-01 | Stop reason: HOSPADM

## 2021-10-31 RX ORDER — HYDRALAZINE HYDROCHLORIDE 20 MG/ML
10 INJECTION INTRAMUSCULAR; INTRAVENOUS ONCE
Status: COMPLETED | OUTPATIENT
Start: 2021-10-31 | End: 2021-10-31

## 2021-10-31 RX ORDER — PANTOPRAZOLE SODIUM 40 MG/1
40 TABLET, DELAYED RELEASE ORAL EVERY MORNING
Status: DISCONTINUED | OUTPATIENT
Start: 2021-10-31 | End: 2021-11-01 | Stop reason: HOSPADM

## 2021-10-31 RX ORDER — ONDANSETRON 2 MG/ML
4 INJECTION INTRAMUSCULAR; INTRAVENOUS EVERY 6 HOURS PRN
Status: DISCONTINUED | OUTPATIENT
Start: 2021-10-31 | End: 2021-11-01 | Stop reason: HOSPADM

## 2021-10-31 RX ORDER — ONDANSETRON 4 MG/1
4 TABLET, FILM COATED ORAL EVERY 6 HOURS PRN
Status: DISCONTINUED | OUTPATIENT
Start: 2021-10-31 | End: 2021-11-01 | Stop reason: HOSPADM

## 2021-10-31 RX ORDER — HYDRALAZINE HYDROCHLORIDE 20 MG/ML
20 INJECTION INTRAMUSCULAR; INTRAVENOUS ONCE
Status: DISCONTINUED | OUTPATIENT
Start: 2021-10-31 | End: 2021-10-31

## 2021-10-31 RX ORDER — INSULIN GLARGINE 100 [IU]/ML
20 INJECTION, SOLUTION SUBCUTANEOUS EVERY MORNING
Status: DISCONTINUED | OUTPATIENT
Start: 2021-10-31 | End: 2021-11-01 | Stop reason: HOSPADM

## 2021-10-31 RX ORDER — ACETAMINOPHEN 325 MG/1
650 TABLET ORAL EVERY 4 HOURS PRN
Status: DISCONTINUED | OUTPATIENT
Start: 2021-10-31 | End: 2021-11-01 | Stop reason: HOSPADM

## 2021-10-31 RX ORDER — DEXTROSE MONOHYDRATE 25 G/50ML
25 INJECTION, SOLUTION INTRAVENOUS
Status: DISCONTINUED | OUTPATIENT
Start: 2021-10-31 | End: 2021-11-01 | Stop reason: HOSPADM

## 2021-10-31 RX ADMIN — INSULIN LISPRO 6 UNITS: 100 INJECTION, SOLUTION INTRAVENOUS; SUBCUTANEOUS at 08:58

## 2021-10-31 RX ADMIN — INSULIN LISPRO 2 UNITS: 100 INJECTION, SOLUTION INTRAVENOUS; SUBCUTANEOUS at 16:48

## 2021-10-31 RX ADMIN — INSULIN GLARGINE 20 UNITS: 100 INJECTION, SOLUTION SUBCUTANEOUS at 12:00

## 2021-10-31 RX ADMIN — SODIUM CHLORIDE 1000 ML: 9 INJECTION, SOLUTION INTRAVENOUS at 02:02

## 2021-10-31 RX ADMIN — INSULIN LISPRO 6 UNITS: 100 INJECTION, SOLUTION INTRAVENOUS; SUBCUTANEOUS at 11:48

## 2021-10-31 RX ADMIN — IOPAMIDOL 100 ML: 755 INJECTION, SOLUTION INTRAVENOUS at 05:17

## 2021-10-31 RX ADMIN — HYDRALAZINE HYDROCHLORIDE 10 MG: 20 INJECTION INTRAMUSCULAR; INTRAVENOUS at 04:44

## 2021-10-31 RX ADMIN — LEVOTHYROXINE SODIUM 50 MCG: 0.05 TABLET ORAL at 11:48

## 2021-10-31 RX ADMIN — PANTOPRAZOLE SODIUM 40 MG: 40 TABLET, DELAYED RELEASE ORAL at 11:48

## 2021-11-01 ENCOUNTER — READMISSION MANAGEMENT (OUTPATIENT)
Dept: CALL CENTER | Facility: HOSPITAL | Age: 81
End: 2021-11-01

## 2021-11-01 ENCOUNTER — APPOINTMENT (OUTPATIENT)
Dept: CARDIOLOGY | Facility: HOSPITAL | Age: 81
End: 2021-11-01

## 2021-11-01 VITALS
DIASTOLIC BLOOD PRESSURE: 84 MMHG | HEIGHT: 70 IN | TEMPERATURE: 97.9 F | BODY MASS INDEX: 24.62 KG/M2 | OXYGEN SATURATION: 98 % | RESPIRATION RATE: 18 BRPM | SYSTOLIC BLOOD PRESSURE: 146 MMHG | WEIGHT: 171.96 LBS | HEART RATE: 61 BPM

## 2021-11-01 LAB
ALBUMIN SERPL-MCNC: 3.5 G/DL (ref 3.5–5.2)
ALBUMIN/GLOB SERPL: 1.3 G/DL
ALP SERPL-CCNC: 103 U/L (ref 39–117)
ALT SERPL W P-5'-P-CCNC: 21 U/L (ref 1–41)
ANION GAP SERPL CALCULATED.3IONS-SCNC: 11.7 MMOL/L (ref 5–15)
AORTIC DIMENSIONLESS INDEX: 0.9 (DI)
ASCENDING AORTA: 3.1 CM
AST SERPL-CCNC: 39 U/L (ref 1–40)
BH CV ECHO MEAS - ACS: 2.1 CM
BH CV ECHO MEAS - AO ACC TIME: 0.08 SEC
BH CV ECHO MEAS - AO MAX PG (FULL): -0.82 MMHG
BH CV ECHO MEAS - AO MAX PG: 7.8 MMHG
BH CV ECHO MEAS - AO MEAN PG (FULL): 0.54 MMHG
BH CV ECHO MEAS - AO MEAN PG: 5 MMHG
BH CV ECHO MEAS - AO ROOT AREA (BSA CORRECTED): 1.7
BH CV ECHO MEAS - AO ROOT AREA: 9 CM^2
BH CV ECHO MEAS - AO ROOT DIAM: 3.4 CM
BH CV ECHO MEAS - AO V2 MAX: 139.9 CM/SEC
BH CV ECHO MEAS - AO V2 MEAN: 106.2 CM/SEC
BH CV ECHO MEAS - AO V2 VTI: 31.1 CM
BH CV ECHO MEAS - ASC AORTA: 3.1 CM
BH CV ECHO MEAS - AVA(I,A): 3.6 CM^2
BH CV ECHO MEAS - AVA(I,D): 3.6 CM^2
BH CV ECHO MEAS - AVA(V,A): 3.9 CM^2
BH CV ECHO MEAS - AVA(V,D): 3.9 CM^2
BH CV ECHO MEAS - BSA(HAYCOCK): 2 M^2
BH CV ECHO MEAS - BSA: 2 M^2
BH CV ECHO MEAS - BZI_BMI: 24.9 KILOGRAMS/M^2
BH CV ECHO MEAS - BZI_METRIC_HEIGHT: 177 CM
BH CV ECHO MEAS - BZI_METRIC_WEIGHT: 78 KG
BH CV ECHO MEAS - CONTRAST EF (2CH): 74.7 CM2
BH CV ECHO MEAS - CONTRAST EF 4CH: 75 CM2
BH CV ECHO MEAS - EDV(CUBED): 32.3 ML
BH CV ECHO MEAS - EDV(MOD-SP2): 95 ML
BH CV ECHO MEAS - EDV(MOD-SP4): 96 ML
BH CV ECHO MEAS - EDV(TEICH): 40.4 ML
BH CV ECHO MEAS - EF(CUBED): 79.8 %
BH CV ECHO MEAS - EF(MOD-BP): 74.5 %
BH CV ECHO MEAS - EF(TEICH): 73.6 %
BH CV ECHO MEAS - ESV(CUBED): 6.5 ML
BH CV ECHO MEAS - ESV(MOD-SP2): 24 ML
BH CV ECHO MEAS - ESV(MOD-SP4): 24 ML
BH CV ECHO MEAS - ESV(TEICH): 10.7 ML
BH CV ECHO MEAS - FS: 41.3 %
BH CV ECHO MEAS - IVS/LVPW: 0.93
BH CV ECHO MEAS - IVSD: 1.4 CM
BH CV ECHO MEAS - LAT PEAK E' VEL: 11.5 CM/SEC
BH CV ECHO MEAS - LV DIASTOLIC VOL/BSA (35-75): 49.2 ML/M^2
BH CV ECHO MEAS - LV MASS(C)D: 161.5 GRAMS
BH CV ECHO MEAS - LV MASS(C)DI: 82.8 GRAMS/M^2
BH CV ECHO MEAS - LV MAX PG: 8.6 MMHG
BH CV ECHO MEAS - LV MEAN PG: 4.4 MMHG
BH CV ECHO MEAS - LV SYSTOLIC VOL/BSA (12-30): 12.3 ML/M^2
BH CV ECHO MEAS - LV V1 MAX: 147 CM/SEC
BH CV ECHO MEAS - LV V1 MEAN: 97.2 CM/SEC
BH CV ECHO MEAS - LV V1 VTI: 29.8 CM
BH CV ECHO MEAS - LVIDD: 3.2 CM
BH CV ECHO MEAS - LVIDS: 1.9 CM
BH CV ECHO MEAS - LVLD AP2: 8.7 CM
BH CV ECHO MEAS - LVLD AP4: 8.1 CM
BH CV ECHO MEAS - LVLS AP2: 6.3 CM
BH CV ECHO MEAS - LVLS AP4: 6.8 CM
BH CV ECHO MEAS - LVOT AREA (M): 3.8 CM^2
BH CV ECHO MEAS - LVOT AREA: 3.8 CM^2
BH CV ECHO MEAS - LVOT DIAM: 2.2 CM
BH CV ECHO MEAS - LVPWD: 1.5 CM
BH CV ECHO MEAS - MED PEAK E' VEL: 7.7 CM/SEC
BH CV ECHO MEAS - MV A DUR: 0.12 SEC
BH CV ECHO MEAS - MV A MAX VEL: 70.7 CM/SEC
BH CV ECHO MEAS - MV DEC SLOPE: 176.2 CM/SEC^2
BH CV ECHO MEAS - MV DEC TIME: 375 SEC
BH CV ECHO MEAS - MV E MAX VEL: 60.9 CM/SEC
BH CV ECHO MEAS - MV E/A: 0.86
BH CV ECHO MEAS - MV MAX PG: 2.6 MMHG
BH CV ECHO MEAS - MV MEAN PG: 1 MMHG
BH CV ECHO MEAS - MV P1/2T MAX VEL: 66 CM/SEC
BH CV ECHO MEAS - MV P1/2T: 109.7 MSEC
BH CV ECHO MEAS - MV V2 MAX: 80.8 CM/SEC
BH CV ECHO MEAS - MV V2 MEAN: 47.5 CM/SEC
BH CV ECHO MEAS - MV V2 VTI: 24.1 CM
BH CV ECHO MEAS - MVA P1/2T LCG: 3.3 CM^2
BH CV ECHO MEAS - MVA(P1/2T): 2 CM^2
BH CV ECHO MEAS - MVA(VTI): 4.6 CM^2
BH CV ECHO MEAS - PA ACC TIME: 0.15 SEC
BH CV ECHO MEAS - PA MAX PG (FULL): 1.3 MMHG
BH CV ECHO MEAS - PA MAX PG: 2.2 MMHG
BH CV ECHO MEAS - PA PR(ACCEL): 13.6 MMHG
BH CV ECHO MEAS - PA V2 MAX: 74.7 CM/SEC
BH CV ECHO MEAS - PULM A REVS DUR: 0.08 SEC
BH CV ECHO MEAS - PULM A REVS VEL: 37 CM/SEC
BH CV ECHO MEAS - PULM DIAS VEL: 43.6 CM/SEC
BH CV ECHO MEAS - PULM S/D: 1.2
BH CV ECHO MEAS - PULM SYS VEL: 50.1 CM/SEC
BH CV ECHO MEAS - PVA(V,A): 2.9 CM^2
BH CV ECHO MEAS - PVA(V,D): 2.9 CM^2
BH CV ECHO MEAS - QP/QS: 0.37
BH CV ECHO MEAS - RAP SYSTOLE: 3 MMHG
BH CV ECHO MEAS - RV MAX PG: 0.97 MMHG
BH CV ECHO MEAS - RV MEAN PG: 0.43 MMHG
BH CV ECHO MEAS - RV V1 MAX: 49.3 CM/SEC
BH CV ECHO MEAS - RV V1 MEAN: 30.2 CM/SEC
BH CV ECHO MEAS - RV V1 VTI: 9.4 CM
BH CV ECHO MEAS - RVOT AREA: 4.4 CM^2
BH CV ECHO MEAS - RVOT DIAM: 2.4 CM
BH CV ECHO MEAS - RVSP: 3 MMHG
BH CV ECHO MEAS - SI(AO): 143.6 ML/M^2
BH CV ECHO MEAS - SI(CUBED): 13.2 ML/M^2
BH CV ECHO MEAS - SI(LVOT): 57.3 ML/M^2
BH CV ECHO MEAS - SI(MOD-SP2): 36.4 ML/M^2
BH CV ECHO MEAS - SI(MOD-SP4): 36.9 ML/M^2
BH CV ECHO MEAS - SI(TEICH): 15.2 ML/M^2
BH CV ECHO MEAS - SUP REN AO DIAM: 2.3 CM
BH CV ECHO MEAS - SV(AO): 280.2 ML
BH CV ECHO MEAS - SV(CUBED): 25.7 ML
BH CV ECHO MEAS - SV(LVOT): 111.7 ML
BH CV ECHO MEAS - SV(MOD-SP2): 71 ML
BH CV ECHO MEAS - SV(MOD-SP4): 72 ML
BH CV ECHO MEAS - SV(RVOT): 41.8 ML
BH CV ECHO MEAS - SV(TEICH): 29.7 ML
BH CV ECHO MEAS - TAPSE (>1.6): 2 CM
BH CV ECHO MEASUREMENTS AVERAGE E/E' RATIO: 6.34
BH CV VAS BP LEFT ARM: NORMAL MMHG
BH CV XLRA - RV BASE: 3.1 CM
BH CV XLRA - RV LENGTH: 7.3 CM
BH CV XLRA - RV MID: 2.6 CM
BH CV XLRA - TDI S': 11.2 CM/SEC
BILIRUB SERPL-MCNC: 0.6 MG/DL (ref 0–1.2)
BUN SERPL-MCNC: 15 MG/DL (ref 8–23)
BUN/CREAT SERPL: 13.8 (ref 7–25)
CALCIUM SPEC-SCNC: 8.6 MG/DL (ref 8.6–10.5)
CHLORIDE SERPL-SCNC: 104 MMOL/L (ref 98–107)
CO2 SERPL-SCNC: 25.3 MMOL/L (ref 22–29)
CREAT SERPL-MCNC: 1.09 MG/DL (ref 0.76–1.27)
DEPRECATED RDW RBC AUTO: 42.1 FL (ref 37–54)
ERYTHROCYTE [DISTWIDTH] IN BLOOD BY AUTOMATED COUNT: 13.1 % (ref 12.3–15.4)
GFR SERPL CREATININE-BSD FRML MDRD: 65 ML/MIN/1.73
GLOBULIN UR ELPH-MCNC: 2.8 GM/DL
GLUCOSE BLDC GLUCOMTR-MCNC: 112 MG/DL (ref 70–130)
GLUCOSE BLDC GLUCOMTR-MCNC: 192 MG/DL (ref 70–130)
GLUCOSE BLDC GLUCOMTR-MCNC: 235 MG/DL (ref 70–130)
GLUCOSE SERPL-MCNC: 117 MG/DL (ref 65–99)
HBA1C MFR BLD: 9.5 % (ref 4.8–5.6)
HCT VFR BLD AUTO: 43.1 % (ref 37.5–51)
HGB BLD-MCNC: 14.1 G/DL (ref 13–17.7)
LEFT ATRIUM VOLUME INDEX: 15 ML/M2
MCH RBC QN AUTO: 29 PG (ref 26.6–33)
MCHC RBC AUTO-ENTMCNC: 32.7 G/DL (ref 31.5–35.7)
MCV RBC AUTO: 88.5 FL (ref 79–97)
PLATELET # BLD AUTO: 173 10*3/MM3 (ref 140–450)
PMV BLD AUTO: 10.7 FL (ref 6–12)
POTASSIUM SERPL-SCNC: 3.9 MMOL/L (ref 3.5–5.2)
PROT SERPL-MCNC: 6.3 G/DL (ref 6–8.5)
RBC # BLD AUTO: 4.87 10*6/MM3 (ref 4.14–5.8)
SINUS: 3.6 CM
SODIUM SERPL-SCNC: 141 MMOL/L (ref 136–145)
STJ: 3 CM
WBC # BLD AUTO: 6.67 10*3/MM3 (ref 3.4–10.8)

## 2021-11-01 PROCEDURE — 97760 ORTHOTIC MGMT&TRAING 1ST ENC: CPT

## 2021-11-01 PROCEDURE — 82962 GLUCOSE BLOOD TEST: CPT

## 2021-11-01 PROCEDURE — G0378 HOSPITAL OBSERVATION PER HR: HCPCS

## 2021-11-01 PROCEDURE — 63710000001 INSULIN LISPRO (HUMAN) PER 5 UNITS

## 2021-11-01 PROCEDURE — 97166 OT EVAL MOD COMPLEX 45 MIN: CPT

## 2021-11-01 PROCEDURE — 93306 TTE W/DOPPLER COMPLETE: CPT | Performed by: INTERNAL MEDICINE

## 2021-11-01 PROCEDURE — 85027 COMPLETE CBC AUTOMATED: CPT | Performed by: NURSE PRACTITIONER

## 2021-11-01 PROCEDURE — 80053 COMPREHEN METABOLIC PANEL: CPT | Performed by: NURSE PRACTITIONER

## 2021-11-01 PROCEDURE — 36415 COLL VENOUS BLD VENIPUNCTURE: CPT

## 2021-11-01 PROCEDURE — 93306 TTE W/DOPPLER COMPLETE: CPT

## 2021-11-01 PROCEDURE — 97530 THERAPEUTIC ACTIVITIES: CPT

## 2021-11-01 PROCEDURE — 99213 OFFICE O/P EST LOW 20 MIN: CPT | Performed by: NURSE PRACTITIONER

## 2021-11-01 PROCEDURE — 97110 THERAPEUTIC EXERCISES: CPT

## 2021-11-01 PROCEDURE — 83036 HEMOGLOBIN GLYCOSYLATED A1C: CPT

## 2021-11-01 PROCEDURE — 97162 PT EVAL MOD COMPLEX 30 MIN: CPT

## 2021-11-01 PROCEDURE — 25010000002 PERFLUTREN (DEFINITY) 8.476 MG IN SODIUM CHLORIDE (PF) 0.9 % 10 ML INJECTION: Performed by: NURSE PRACTITIONER

## 2021-11-01 PROCEDURE — 93246 EXT ECG>7D<15D RECORDING: CPT

## 2021-11-01 PROCEDURE — 63710000001 INSULIN GLARGINE PER 5 UNITS: Performed by: NURSE PRACTITIONER

## 2021-11-01 RX ORDER — HYDROCODONE BITARTRATE AND ACETAMINOPHEN 5; 325 MG/1; MG/1
1 TABLET ORAL EVERY 6 HOURS PRN
Qty: 10 TABLET | Refills: 0 | Status: SHIPPED | OUTPATIENT
Start: 2021-11-01 | End: 2021-11-07

## 2021-11-01 RX ORDER — ACETAMINOPHEN 325 MG/1
650 TABLET ORAL EVERY 4 HOURS PRN
Start: 2021-11-01 | End: 2022-04-07

## 2021-11-01 RX ADMIN — INSULIN GLARGINE 20 UNITS: 100 INJECTION, SOLUTION SUBCUTANEOUS at 06:41

## 2021-11-01 RX ADMIN — INSULIN LISPRO 2 UNITS: 100 INJECTION, SOLUTION INTRAVENOUS; SUBCUTANEOUS at 12:00

## 2021-11-01 RX ADMIN — PANTOPRAZOLE SODIUM 40 MG: 40 TABLET, DELAYED RELEASE ORAL at 06:41

## 2021-11-01 RX ADMIN — PERFLUTREN 2 ML: 6.52 INJECTION, SUSPENSION INTRAVENOUS at 14:07

## 2021-11-01 NOTE — CONSULTS
Orthopaedic & Hand Surgery  Thumb pain Consult Note  Dr. Chapin Singh  [phone]    CC: Left thumb pain    HPI:  Patient is a 81 y.o. right-hand-dominant male who presents with left thumb pain    Pain is characterized as follows:    • Onset: 10/31/2021  • Location: Left thumb base  • Quality: Achy  • Severity: Moderate at this point  • Modifying factors: Improved with immobilization  • Context: Sustained injury when he fell down 12 steps in his house after what is described as a syncopal episode. Woke up, amnestic to the direct events, surrounded by family. Presented to emergency department where he was noted to have pain in his neck as well as his left thumb and right hip. Imaging was obtained and notable for a nondisplaced fracture of the thumb metacarpal base. Orthopedics was consulted for further evaluation and management. Also being evaluated by cardiology for syncopal work-up and neurosurgery as a result of a C4 fracture    MEDICAL HISTORY  Past Medical History:   Diagnosis Date   • Diabetes mellitus (HCC)    • Dyspepsia    • Erectile dysfunction    • Gastritis 04/09/2014    Marked gastritis by EGD- Dr. BRAXTON Garcia; H. Pylori +   • Gastroparesis     s/p gastrectomy   • GERD (gastroesophageal reflux disease)    • Personal history of malignant neoplasm of prostate     2006 s/p XRT   • Pes cavus    • SCC (squamous cell carcinoma)     scalp/ neck   ·   Past Surgical History:   Procedure Laterality Date   • CHOLECYSTECTOMY  05/2014    Dr. Ton Killian   • COLONOSCOPY  10/2014    Dr. Killian   • PARTIAL GASTRECTOMY  05/2014    Dr. Killian; for gastric outlet obstruction   ·   Prior to Admission medications    Medication Sig Start Date End Date Taking? Authorizing Provider   En Noir Microlet Lancets lancets USE TO TEST BLOOD SUGAR ONE TIME DAILY. 4/1/21   Travon Shah MD   BD Pen Needle Nola U/F 32G X 4 MM misc Use with insulin every day 11/19/20   Travon Shah MD   Blood Glucose Monitoring Suppl  (BLOOD GLUCOSE MONITOR SYSTEM) w/Device kit 1 each 2 (Two) Times a Day. 10/1/19   Travon Shah MD   glucose blood test strip 1 each by Other route 2 (Two) Times a Day. Use as instructed 10/20/20   Travon Shah MD   levothyroxine (Synthroid) 50 MCG tablet Take 1 tablet by mouth Daily. 10/1/20   Travon Shah MD   metFORMIN ER (GLUCOPHAGE-XR) 500 MG 24 hr tablet Take 2 tablets by mouth Daily With Breakfast.  Patient taking differently: Take 500 mg by mouth Daily With Breakfast & Dinner. 10/1/20   Travon Shah MD   omeprazole (PriLOSEC) 20 MG capsule Take 20 mg by mouth daily.    Provider, MD Luis Carlos   TRESIBA FLEXTOUCH 200 UNIT/ML solution pen-injector pen injection INJECT 30 UNITS UNDER THE SKIN INTO THE APPROPRIATE AREA AS DIRECTED DAILY  Patient taking differently: Inject 30 Units under the skin into the appropriate area as directed Daily. 8/20/20   Travon Shah MD   ·   · No Known Allergies  Most Recent Immunizations   Administered Date(s) Administered   • COVID-19 (MODERNA) 02/09/2021   • Fluad Quad 65+ 10/01/2020   • Fluzone High Dose =>65 Years ([employer] ONLY) 11/06/2018   • Fluzone High-Dose 65+yrs 10/29/2021   • TD Preservative Free 06/27/2018   ·   Social History     Tobacco Use   • Smoking status: Former Smoker   • Smokeless tobacco: Never Used   Substance Use Topics   • Alcohol use: No   ·    Social History     Substance and Sexual Activity   Drug Use No   ·     REVIEW OF SYSTEMS:  · General: negative for fevers or chills  · Head: negative for headache  · Respiratory: negative for shortness of breath.   · Cardiovascular: negative for chest pain.   · Gastrointestinal: negative for nausea or vomiting.   · Neurological: negative for numbness or paresthesias  · Psychiatric/Behavioral: negative for memory loss.   · All other systems reviewed and are negative    VITALS: /67 (BP Location: Right arm, Patient Position: Lying)   Pulse 63   Temp 97.8 °F (36.6 °C) (Oral)   Resp 16   " Ht 177.8 cm (70\")   Wt 78.4 kg (172 lb 12.8 oz)   SpO2 94%   BMI 24.79 kg/m²  Body mass index is 24.79 kg/m².    PHYSICAL EXAM:   · CONSTITUTIONAL: No acute distress  · EXTREMITY: Left Upper Extremity  · INSPECTION: Skin warm and well-perfused. In thumb spica splint currently. Denies laceration to the skin.  · Palpation: Tender palpation at the base of the thumb but not otherwise in the hand  · Vascular: Brisk cap refill to all digits including the thumb  · Sensation: Intact to light touch in the thumb and the remaining fingertips.  · Motor: Able to flex, extend and abduct the fingers including the thumb.  · Range of Motion / Stability: Range of motion of the thumb is restricted due to splinting, however, he can fully range the thumb IP joint without pain.    RADIOLOGY REVIEW:   XR Hand 3+ View Left    Result Date: 10/31/2021  Fracture involving the base of the thumb metacarpal.  This report was finalized on 10/31/2021 4:59 AM by Dr. Xiao Miranda M.D.       XR Hip With or Without Pelvis 2 - 3 View Right    Result Date: 10/31/2021  No acute fracture or subluxation identified.  This report was finalized on 10/31/2021 2:40 AM by Dr. Xiao Miranda M.D.      CT scan of the left hand and wrist with thin axial images and coronal  and sagittal reconstructions     HISTORY: Trauma. Probable fracture at base of first metacarpal noted on  plain films.     The CT scan was performed through the hand and wrist with thin axial  images followed by coronal and sagittal reconstructions and compared to  plain films performed 11 hours ago. The following findings are present:  1. The CT scan confirms the presence of a nondisplaced fracture at the  base of the first metacarpal at its outer margin. There is no associated  articular irregularity that there are severe degenerative changes at the  first carpal metacarpal joint.  2. No other acute fracture is seen. There is slight degenerative change  involving the first MCP " joint and to a lesser extent the second and  third MCP joints.     Radiation dose reduction techniques were utilized, including automated  exposure control and exposure modulation based on body size.     This report was finalized on 10/31/2021 5:25 PM by Dr. Navi Vegas M.D.    I have independently reviewed the images noted above, and agree with radiologist assessment as noted above.  LABS:   Results for the past 24 hours:   Recent Results (from the past 24 hour(s))   POC Glucose Once    Collection Time: 10/31/21  8:41 AM    Specimen: Blood   Result Value Ref Range    Glucose 289 (H) 70 - 130 mg/dL   POC Glucose Once    Collection Time: 10/31/21 11:20 AM    Specimen: Blood   Result Value Ref Range    Glucose 260 (H) 70 - 130 mg/dL   POC Glucose Once    Collection Time: 10/31/21  4:33 PM    Specimen: Blood   Result Value Ref Range    Glucose 197 (H) 70 - 130 mg/dL   POC Glucose Once    Collection Time: 10/31/21  8:41 PM    Specimen: Blood   Result Value Ref Range    Glucose 99 70 - 130 mg/dL   POC Glucose Once    Collection Time: 11/01/21  6:02 AM    Specimen: Blood   Result Value Ref Range    Glucose 112 70 - 130 mg/dL       IMPRESSION:  Patient is a 81 y.o. male with left thumb metacarpal extra-articular base fracture sustained 10/31/2021.    I discussed the nature of the condition with the patient including relevant anatomy, natural history and conservative and surgical treatment options. Conservative treatment would entail transition to an Orthoplast thumb spica brace fabricated by occupational therapy with immobilization all the time for at least 4 weeks, transitioning to range of motion exercises and gentle weaning of the brace for an additional 4 to 6 weeks. 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 Following a thorough conversation, questions were  solicited and answered to his satisfaction. He voiced understanding of the conversation and stated desire to proceed with conservative care as outlined above.    PLAN:   · Admited to: Joshua Evans MD  · Diet: Per Primary Team  · Weight Bearing:  · Left Upper Extremity: Light use of the hand only. He should wear the brace at all times initially, coming out of it only for showering and hand hygiene.  · Labs: None additional needed  · Imaging: None additional needed  · Surgery: No surgery indicated for this injury  · Follow-up in approximately 2 weeks for repeat evaluation and to ensure no displacement. He should present sooner if pain worsens.  · Disposition: Acute, per primary. Orthopedics to sign off at this time. Thank you for allowing us to take part in the care of your patient. Please call us with any questions or concerns.    Chapin Singh MD, PhD  Orthopaedic & Hand Surgery  [facility]  [phone] - [place] [facility]  [phone] - [facility]

## 2021-11-01 NOTE — DISCHARGE INSTRUCTIONS
Remain in hard collar at all times except for showers.  Do not flex, extend, bend, or twist neck while out of collar or shower.  May drop chin rest at meals by turning front yellow [place] counterclockwise.  Resume position with arrow pointing at 12:00 after meal complete  No driving  No lift push pull more than 5 pounds.  No lifting overhead  Follow-up neurosurgery 6 weeks with x-rays  Return to ER or call neurosurgery for new onset extremity pain, numbness, tingling, weakness, bowel or bladder changes, new or uncontrolled neck pain

## 2021-11-01 NOTE — DISCHARGE SUMMARY
NAME: Bennie Greene ADMIT: 10/31/2021   : 1940  PCP: Travon Shah MD    MRN: [id number] LOS: 0 days   AGE/SEX: 81 y.o. male  ROOM: [facility] ([place]     Date of Admission:  10/31/2021  Date of Discharge:  2021    PCP: Travon Shah MD    CHIEF COMPLAINT  Fall, Head Injury, Neck Pain, and Wrist Injury      DISCHARGE DIAGNOSIS  Active Hospital Problems    Diagnosis  POA   • **Syncope and collapse [R55]  Yes   • C4 cervical fracture (HCC) [S12.300A]  Yes   • Thumb fracture [S62.509A]  Yes   • Other hyperlipidemia [E78.49]  Yes   • Acquired hypothyroidism [E03.9]  Yes   • Personal history of malignant neoplasm of prostate [Z85.46]  Not Applicable   • Type 2 diabetes mellitus with hyperglycemia, with long-term current use of insulin (HCC) [E11.65, Z79.4]  Not Applicable      Resolved Hospital Problems   No resolved problems to display.       SECONDARY DIAGNOSES  Past Medical History:   Diagnosis Date   • Diabetes mellitus (HCC)    • Dyspepsia    • Erectile dysfunction    • Gastritis 2014    Marked gastritis by EGD- Dr. BRAXTON Garcia; H. Pylori +   • Gastroparesis     s/p gastrectomy   • GERD (gastroesophageal reflux disease)    • Personal history of malignant neoplasm of prostate      s/p XRT   • Pes cavus    • SCC (squamous cell carcinoma)     scalp/ neck       CONSULTS   [facility]  Cardiology  Hand Surgery    HOSPITAL COURSE    Mr. Greene is a 81 year old male who presented to the hospital with complaints of a syncopal episode and fall. Occurred without warning. No prior neurological or cardiac history. Doubt seizures as no post-ictal phase.  Clearly was not orthostatic or vasovagal and occurred after he was going up the stairs to get a piece of cake.  There is also possibility that it may have just been a mechanical fall and then he hit his head and did not remember.  He was monitored on telemetry in the hospital without any significant arrhythmia.  He is getting a 2D echo (was unremarkable) and  will be discharged with a Zio patch to monitor for arrhythmia after discharge.    Patient did have C4 fracture.  He was seen by neurosurgery who obtain MRI with results as below.  No plans for any surgery and their instructions are as follows.  Additionally he does have a thumb fracture and will follow up with hand surgery as an outpatient    PLAN:   Okay for discharge from neurosurgical standpoint when medically cleared  Remain in hard collar at all times except for showers.  Do not flex, extend, bend, or twist neck while out of collar or shower.  No driving  No lift push pull more than 5 pounds.  No lifting overhead  Follow-up neurosurgery 6 weeks with x-rays  Return to ER or call neurosurgery for new onset extremity pain, numbness, tingling, weakness, bowel or bladder changes, new or uncontrolled neck pain    He is doing well and wishes for discharge today    DIAGNOSTICS    2D ECHO 11/1/21  · Left ventricular ejection fraction appears to be 66 - 70%. Left ventricular systolic function is normal.  · Left ventricular diastolic function was normal.  · No significant valvular stenosis or regurgitation noted.         MRI Cervical Spine Without Contrast [[id number]] Grant as Reviewed   Order Status: Completed Collected: 10/31/21 1744    Updated: 10/31/21 1949   Narrative:     MRI OF THE CERVICAL SPINE WITHOUT CONTRAST 10/31/2021       CLINICAL HISTORY: Patient fell, neck pain.       TECHNIQUE: Sagittal T1, gradient echo T2 and fat-suppressed fast spin   echo T2 weighted images were obtained of the cervical spine. In addition   axial gradient echo T2-weighted images were obtained from the skull base   to T1 and oblique sagittal T2-weighted images were obtained angled   through the right and left cervical neural foramina.       COMPARISON: This is correlated to a cervical spine CT earlier this   morning 10/31/2021 at 3 AM.       FINDINGS: The craniocervical junction is normal in appearance. The   cervical cord signal at  the C4-5 interspace level is difficult to   assess. Some minimal T2 high signal in the cord at this site is   difficult to exclude. Otherwise the cervical cord and upper thoracic   cord are normal in contour and signal intensity.       At C1-2 there are some arthritic changes at the atlantodental interval.   Otherwise the C1-2 level is normal in appearance.       At C2-3 there is some bony fusion across the facet joints and minimal   bony bridging across the posterior endplates. I see no canal or   foraminal narrowing.       At C3-4 there may be some bony bridging across the endplates. There is   mild right facet overgrowth and there is some bony fusion across the   facet joints. I see no canal narrowing. There is mild left but no right   foraminal narrowing.       At C4-5 there is severe disc space narrowing. There are degenerative   disc and endplate changes. There is a 3 mm retrolisthesis of C4 with   respect to C5. Posterior endplate spurs abut and deform the ventral   surface of the cord at least mild to moderately up to moderately   narrowing the canal. There is uncovertebral joint spurring into the   foramina and there is moderate bilateral foraminal narrowing. Where   there is focal mild-to-moderate narrowing of the central canal it is   difficult to evaluate the cervical cord signal on axial image 25   sequence 7 and sagittal images 7 and 8. I cannot exclude some very   minimal focal T2 high signal in the cord from minimal edema in the cord.   Cervical spine CT earlier this morning 10/31/2021 at 3:27 AM   demonstrates acute fractures of the anterior and posterior tubercles of   the C4 vertebra extending into left transverse foramen of C4. Also there   is an acute fracture of the distal aspect of the C4 spinous process.   There is some patchy T2 high signal along the margins of the acute   fracture of the distal C4 spinous process and extending into the right   posterior paravertebral musculature from C4 to  T1. There is a thin   sliver of prevertebral edema tracking anterior to the C4 and C5   vertebrae.       At C5-6 there is disc space narrowing, degenerative endplate changes,   minimal posterior endplate spurring, and minimal if any canal narrowing.   The facets are normal. There is some uncovertebral joint hypertrophy.   There is only mild bilateral foraminal narrowing.       At C6-7 there is disc space narrowing, degenerative endplate changes,   and mild posterior spurring. Essentially no canal narrowing. The facets   and uncovertebral joints are normal. There is no foraminal narrowing.       At C7-T1 there is disc space narrowing and degenerative endplate   changes. There is no canal or foraminal narrowing.       Impression:     There is essentially bony fusion across the endplates and   facets at C2-3 and C3-4 serving to ankylose the C2 through C4 vertebrae   and their posterior elements. At C4-5 there is severe disc space   narrowing, degenerative endplate changes, and 3 mm retrolisthesis of C4   with respect to C5.  Some posterior spurring abuts and mildly flattens   the ventral surface of the cord. There is at least mild-to-moderate up   to moderate narrowing of the anterior posterior diameter of the canal. I   cannot entirely exclude very subtle minimal T2 high signal in the   cervical cord at the C4-5 level from minimal edema although it could be   artifact. There is moderate bilateral bony foraminal narrowing at C4-5.   Furthermore the patient had trauma and has acute fractures of left   anterior posterior tubercles of the C4 vertebra extending into the left   transverse foramen of C4. There is also an acute fracture at the distal   tip of the right side of the C4 spinous process adjacent to which is   some minimal interspinous edema and some right paravertebral muscular   edema from C4 to C7. There is also a thin sliver of prevertebral edema   anterior to the C4 and C5 vertebrae. There is some very  minimal bone   marrow edema in the superior body and endplate of the C5 cervical   vertebra but no discernible fracture is seen at the site on yesterday's   CT. The remainder of the cervical spine MRI is unremarkable.       I communicated the results to Erik Peoples MD by telephone on   10/31/2021 at 4 PM.       This report was finalized on 10/31/2021 7:46 PM by Dr. Alex Herron M.D.       CT Upper Extremity Left Without Contrast [[id number]] Grant as Reviewed   Order Status: Completed Collected: 10/31/21 1645    Updated: 10/31/21 1728   Narrative:     CT scan of the left hand and wrist with thin axial images and coronal   and sagittal reconstructions       HISTORY: Trauma. Probable fracture at base of first metacarpal noted on   plain films.       The CT scan was performed through the hand and wrist with thin axial   images followed by coronal and sagittal reconstructions and compared to   plain films performed 11 hours ago. The following findings are present:   1. The CT scan confirms the presence of a nondisplaced fracture at the   base of the first metacarpal at its outer margin. There is no associated   articular irregularity that there are severe degenerative changes at the   first carpal metacarpal joint.   2. No other acute fracture is seen. There is slight degenerative change   involving the first MCP joint and to a lesser extent the second and   third MCP joints.                       Radiation dose reduction techniques were utilized, including automated   exposure control and exposure modulation based on body size.       This report was finalized on 10/31/2021 5:25 PM by Dr. Nvai Vegas M.D.       CT Angiogram Neck [[id number]] Grant as Reviewed   Order Status: Completed Collected: 10/31/21 0527    Updated: 10/31/21 0542   Narrative:     CT ANGIOGRAM OF THE NECK       HISTORY: Potential acute fracture of the left transverse foramen of C4       COMPARISON: 10/31/2021       TECHNIQUE: Axial CT imaging  was obtained through the neck. IV contrast   was administered. Three-D reformatted images were obtained.  NASCET   criteria were used.       FINDINGS:   There is normal arch anatomy. Both common carotid arteries are widely   patent. There is some mild plaque noted at the carotid bifurcations   bilaterally, but there is no hemodynamically significant stenosis. The   cervical internal carotid arteries are unremarkable bilaterally. The   intracranial internal carotid arteries are also without hemodynamically   significant stenosis. Right vertebral artery is slightly larger in   caliber than the left. It is widely patent, without any evidence of   stenosis or dissection. No injury of the left vertebral artery is seen.   The intracranial vertebral arteries are widely patent. The right is   dominant. Basilar artery appears unremarkable. Think there may be a   fetal origin of the right posterior cerebral artery. There is a   posterior communicating artery on the left. Visualized middle cerebral   and anterior cerebral arteries appear to be patent. No venous occlusion   is identified. The orbits appear unremarkable. Nasopharynx and   oropharynx are within normal limits. Full assessment is limited due to   streak artifact from dental amalgam.       Impression:     No vascular injury identified. Please see the CT cervical spine for   discussion osseous findings.       Radiation dose reduction techniques were utilized, including automated   exposure control and exposure modulation based on body size.       This report was finalized on 10/31/2021 5:39 AM by Dr. Xiao Miranda M.D.       XR Hand 3+ View Left [[id number]] Grant as Reviewed   Order Status: Completed Collected: 10/31/21 0456    Updated: 10/31/21 0502   Narrative:     3 VIEWS LEFT HAND       HISTORY: Thumb fracture       COMPARISON: None available.       FINDINGS:   The patient appears to be fracture involving the base of the thumb   metacarpal. No additional  fractures are seen. Degenerative changes are   noted, most in keeping with osteoarthritis.       Impression:     Fracture involving the base of the thumb metacarpal.       This report was finalized on 10/31/2021 4:59 AM by Dr. Xiao Miranda M.D.       CT Head Without Contrast [[id number]] Grant as Reviewed   Order Status: Completed Collected: 10/31/21 0328    Updated: 10/31/21 0336   Narrative:     CT HEAD WITHOUT CONTRAST       HISTORY: Syncope. Headache.       COMPARISON: None available.       TECHNIQUE: Axial CT imaging was obtained through the brain. No IV   contrast was administered.       FINDINGS:   No acute intracranial hemorrhage is seen. There is atrophy. There is   periventricular and deep white matter microangiopathic change. There is   no midline shift or mass effect. No calvarial fracture is seen. There is   some mucosal thickening noted within the ethmoid and right sphenoid   sinuses. Mastoid air cells appear clear. There is a right frontal scalp   hematoma. It measures at least 4.3 x 0.8 cm.       Impression:     No acute intracranial findings.       Radiation dose reduction techniques were utilized, including automated   exposure control and exposure modulation based on body size.       This report was finalized on 10/31/2021 3:33 AM by Dr. Xiao Miranda M.D.       CT Cervical Spine Without Contrast [[id number]] Grant as Reviewed   Order Status: Completed Collected: 10/31/21 0343    Updated: 10/31/21 0411   Narrative:     CT OF THE CERVICAL SPINE       HISTORY: Fall with neck pain       COMPARISON: None available.       TECHNIQUE: Axial CT imaging was obtained through the cervical spine.   Coronal and sagittal reformatted images were obtained.       FINDINGS:   Ossific fragments are seen adjacent to the superior aspect of the   anterior arch of C1. I suspect this is a chronic degenerative finding.   Acute fracture is not completely excluded. Furthermore, the patient has   a lucency which  traverses the left transverse foramen of C4. Acute   fracture cannot be excluded. No prevertebral soft tissue swelling is   seen.       C2-C3: There is no canal stenosis or neural foraminal narrowing.   C3-C4: There is no canal stenosis. There is mild neural foraminal   narrowing on the right and moderate narrowing on the left.   C4-C5: Disc osteophyte complex results in mild canal narrowing. There is   moderate neural foraminal narrowing on the right severe narrowing on the   left.   C5-C6: Disc osteophyte complex results in mild canal narrowing. There is   mild bilateral neural foraminal narrowing.   C6-C7: Disc osteophyte complex results in mild narrowing of the canal.   There is no significant neural foraminal narrowing.   C7-T1: There is no canal stenosis or neural foraminal narrowing.       Images through the lung apices do not demonstrate any acute   abnormalities.       Impression:         1. There is lucency seen traversing the transverse foramen of C4. Acute   fracture cannot be excluded in the setting of trauma. Given the   involvement of the transverse foramen, further assessment with CT   angiography of the neck is recommended.   2. Patient has some ossific fragments which are seen adjacent to the   superior aspect of the anterior arch of C1. I suspect this may be a   chronic degenerative finding. However, MRI could be considered for   further assessment.       FINDINGS were called to Physician Assistant Jamir Ybarra at 4:04 AM.       Radiation dose reduction techniques were utilized, including automated   exposure control and exposure modulation based on body size.       This report was finalized on 10/31/2021 4:08 AM by Dr. Xiao Miranda M.D.       XR Chest 1 View [[id number]] Grant as Reviewed   Order Status: Completed Collected: 10/31/21 0234    Updated: 10/31/21 0239   Narrative:     SINGLE VIEW OF THE CHEST       HISTORY: Syncope       COMPARISON: 05/12/2014       FINDINGS:   Heart size is  within normal limits. No pneumothorax is seen. Calcified   granulomas seen within the right upper lobe. There are old left-sided   rib fractures. There is some chronic scarring identified at the lung   bases. No pneumothorax or pleural effusion is seen. No acute infiltrates   are identified.       Impression:     No acute findings.       This report was finalized on 10/31/2021 2:35 AM by Dr. Xiao Miranda M.D.       XR Hip With or Without Pelvis 2 - 3 View Right [[id number]] Grant as Reviewed   Order Status: Completed Collected: 10/31/21 0236    Updated: 10/31/21 0243   Narrative:     AP VIEW THE PELVIS PLUS 2 VIEWS RIGHT HIP       HISTORY: Right hip pain after a fall       COMPARISON: None available.       FINDINGS:   No acute fracture or subluxation of the bony pelvis or either hip is   seen. There are symmetric degenerative changes involving both hips.   There are additional degenerative changes involving the lumbosacral   spine.       Impression:     No acute fracture or subluxation identified.            Collected Updated Procedure Result Status    11/01/2021 0651 11/01/2021 0745 Hemoglobin A1c [[id number]]    (Abnormal)   Blood    Final result Component Value Units   Hemoglobin A1C 9.50 High  %           11/01/2021 0651 11/01/2021 0736 CBC (No Diff) [[id number]]   Blood    Final result Component Value Units   WBC 6.67 10*3/mm3   RBC 4.87 10*6/mm3   Hemoglobin 14.1 g/dL   Hematocrit 43.1 %   MCV 88.5 fL   MCH 29.0 pg   MCHC 32.7 g/dL   RDW 13.1 %   RDW-SD 42.1 fl   MPV 10.7 fL   Platelets 173 10*3/mm3           11/01/2021 0651 11/01/2021 0808 Comprehensive Metabolic Panel [[id number]]    (Abnormal)   Blood    Final result Component Value Units   Glucose 117 High  mg/dL   BUN 15 mg/dL   Creatinine 1.09 mg/dL   Sodium 141 mmol/L   Potassium 3.9 mmol/L   Chloride 104 mmol/L   CO2 25.3 mmol/L   Calcium 8.6 mg/dL   Total Protein 6.3 g/dL   Albumin 3.50 g/dL   ALT (SGPT) 21 U/L   AST (SGOT) 39 U/L   Alkaline  Phosphatase 103 U/L   Total Bilirubin 0.6 mg/dL   eGFR Non African Amer 65 mL/min/1.73   Globulin 2.8 gm/dL   A/G Ratio 1.3 g/dL   BUN/Creatinine Ratio 13.8    Anion Gap 11.7 mmol/L           10/31/2021 0616 10/31/2021 0744 Urinalysis With Microscopic If Indicated (No Culture) - Urine, Clean Catch [[id number]]   (Abnormal)   Urine, Clean Catch    Final result Component Value   Color, UA Yellow   Appearance, UA Clear   pH, UA 6.0   Specific Gravity, UA 1.015   Glucose, UA >=1000 mg/dL (3+) Abnormal    Ketones, UA Negative   Bilirubin, UA Negative   Blood, UA Trace Abnormal    Protein, UA Negative   Leuk Esterase, UA Negative   Nitrite, UA Negative   Urobilinogen, UA 1.0 E.U./dL          PHYSICAL EXAM  Objective    Alert  nad  No resp distress  RRR  Hard C- collar    CONDITION ON DISCHARGE  Stable.      DISCHARGE DISPOSITION   Home or Self Care      DISCHARGE MEDICATIONS       Your medication list      START taking these medications      Instructions Last Dose Given Next Dose Due   acetaminophen 325 MG tablet  Commonly known as: TYLENOL      Take 2 tablets by mouth Every 4 (Four) Hours As Needed for Mild Pain .       HYDROcodone-acetaminophen 5-325 MG per tablet  Commonly known as: NORCO      Take 1 tablet by mouth Every 6 (Six) Hours As Needed for Moderate Pain  for up to 6 days.          CHANGE how you take these medications      Instructions Last Dose Given Next Dose Due   metFORMIN  MG 24 hr tablet  Commonly known as: GLUCOPHAGE-XR  What changed:   · how much to take  · when to take this      Take 2 tablets by mouth Daily With Breakfast.       Tresiba FlexTouch 200 UNIT/ML solution pen-injector pen injection  Generic drug: Insulin Degludec  What changed: See the new instructions.      INJECT 30 UNITS UNDER THE SKIN INTO THE APPROPRIATE AREA AS DIRECTED DAILY          CONTINUE taking these medications      Instructions Last Dose Given Next Dose Due   Afshin Microlet Lancets lancets      USE TO TEST BLOOD SUGAR  ONE TIME DAILY.       BD Pen Needle Nola U/F 32G X 4 MM misc  Generic drug: Insulin Pen Needle      Use with insulin every day       Blood Glucose Monitor System w/Device kit      1 each 2 (Two) Times a Day.       glucose blood test strip      1 each by Other route 2 (Two) Times a Day. Use as instructed       levothyroxine 50 MCG tablet  Commonly known as: Synthroid      Take 1 tablet by mouth Daily.       omeprazole 20 MG capsule  Commonly known as: priLOSEC      Take 20 mg by mouth daily.             Where to Get Your Medications      These medications were sent to [facility]/pharmacy #[id number] - [place], [place] - [address] - [phone]  - [phone] [address] [address]    Phone: [phone]   · HYDROcodone-acetaminophen 5-325 MG per tablet     Information about where to get these medications is not yet available    Ask your nurse or doctor about these medications  · acetaminophen 325 MG tablet          Future Appointments   Date Time Provider Department Center   12/28/2021  9:10 AM [facility]   1/11/2022  9:00 AM Mary Ann Carvajal APRN [facility]   3/29/2022  8:40 AM [facility]   4/12/2022  9:15 AM Grecia Parham MD [facility]   5/6/2022 10:45 AM Travon Shah MD [facility]     Additional Instructions for the Follow-ups that You Need to Schedule     Discharge Follow-up with Specialty: PCP in 2 weeks, Neurosurgery in 6 weeks, hand surgery in 2 weeks   As directed      Specialty: PCP in 2 weeks, Neurosurgery in 6 weeks, hand surgery in 2 weeks            Follow-up Information     Chapin Singh MD. Schedule an appointment as soon as possible for a visit in 2 week(s).    Specialty: Orthopedic Surgery  Contact information:  [address] [facility] [address]  [facility] [address] [phone]             Travon Shah MD .    Specialties: Family Medicine, Emergency Medicine  Contact information:  [address]  [place] [address]  [phone]                         TEST  RESULTS PENDING AT DISCHARGE         Joshua Evans MD  [facility]  11/01/21  14:10 EDT      Time: greater than 32 minutes on discharge  It was a pleasure taking care of this patient while in the hospital.

## 2021-11-01 NOTE — PLAN OF CARE
Goal Outcome Evaluation:  Plan of Care Reviewed With: patient        Progress: improving  Outcome Summary: Patient is an 82 yo male who presented after a fall at home down steps. Pt with C4 fx and L thumb fx. CT head negative for intracranial hemorrhage. Pt to wear c-collar and L thumb splint. Per ortho MD Light use of the L hand only. He lives with his spouse and is ind at baseline without use of AD. Pt presents today with L buttock and thumb pain. He completed bed mobility and STS independently. He ambulated 300ft without AD independently with no LOB or unsteadiness noted. No acute PT needs. PT to sign off. Anticipate dc home with spouse.    Patient was intermittently wearing a face mask during this therapy encounter. Therapist used appropriate personal protective equipment including eye protection, mask, and gloves.  Mask used was standard procedure mask. Appropriate PPE was worn during the entire therapy session. Hand hygiene was completed before and after therapy session. Patient is not in enhanced droplet precautions.

## 2021-11-01 NOTE — PROGRESS NOTES
Discharge Planning Assessment  [facility]     Patient Name: Bennie Greene  MRN: [id number]  Today's Date: 11/1/2021    Admit Date: 10/31/2021     Discharge Needs Assessment    No documentation.                Discharge Plan     Row Name 11/01/21 1415       Plan    Plan Home    Final Discharge Disposition Code 01 - home or self-care    Final Note Home              Continued Care and Services - Admitted Since 10/31/2021    Coordination has not been started for this encounter.       Expected Discharge Date and Time     Expected Discharge Date Expected Discharge Time    Nov 1, 2021          Demographic Summary    No documentation.                Functional Status    No documentation.                Psychosocial    No documentation.                Abuse/Neglect    No documentation.                Legal    No documentation.                Substance Abuse    No documentation.                Patient Forms    No documentation.                   Kassandra Herring RN

## 2021-11-01 NOTE — PROGRESS NOTES
[facility] PROGRESS NOTE      CC: C4 fracture, fall/syncope 10/30      Subjective     Interval History: Denies neck pain but does admit to some stiffness.  Tolerating collar.  Denies numbness tingling weakness of his extremities.  Had cardiac testing today.  Planning for discharge with Zio patch.    ROS:  Neck: neck stiffness  Neuro: No numbness, tingling, or weakness,  no balance difficulties    Objective     Vital signs in last 24 hours:  Temp:  [97.6 °F (36.4 °C)-99 °F (37.2 °C)] 98.5 °F (36.9 °C)  Heart Rate:  [63-78] 64  Resp:  [16-18] 18  BP: (129-154)/(67-85) 133/81    Intake/Output this shift:  I/O this shift:  In: 240 [P.O.:240]  Out: 200 [Urine:200]    LABS:  Results from last 7 days   Lab Units 11/01/21  0651 10/31/21  0616 10/31/21  0200   WBC 10*3/mm3 6.67 9.40 8.02   HEMOGLOBIN g/dL 14.1 14.7 14.4   HEMATOCRIT % 43.1 44.2 42.5   PLATELETS 10*3/mm3 173 172 180     Results from last 7 days   Lab Units 11/01/21  0651 10/31/21  0616 10/31/21  0200   SODIUM mmol/L 141 138 139   POTASSIUM mmol/L 3.9 3.9 4.4   CHLORIDE mmol/L 104 105 105   CO2 mmol/L 25.3 23.2 25.7   BUN mg/dL 15 15 18   CREATININE mg/dL 1.09 0.88 1.11   CALCIUM mg/dL 8.6 8.5* 8.7   BILIRUBIN mg/dL 0.6  --  0.4   ALK PHOS U/L 103  --  132*   ALT (SGPT) U/L 21  --  21   AST (SGOT) U/L 39  --  26   GLUCOSE mg/dL 117* 227* 233*       IMAGING STUDIES:  MRI cervical spine-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  There is mild retrolisthesis of C4 on 5.  Radiologist feels there may be a subtle T2 signal change in the cord at C4/5 although could be artifact as well.  There is neuroforaminal narrowing at C4/5.  There is a fracture at the distal tip of the right C4 spinous process with adjacent interspinous edema and right paravertebral muscular edema C4-7.  Thin sliver of prevertebral edema anterior to C4 and 5 vertebral bodies and some very minimal bone marrow edema in the superior  endplate of C5 but no obvious fracture or height loss.  There are mild degenerative changes at other levels.  No significant canal narrowing.    I personally viewed and interpreted the patient's MRI cervical spine.    Meds reviewed/changed: Yes  No pain meds     Physical Exam:    General:   Awake, alert, oriented x3. Speech clear with no aphasia.  Sitting up in chair  HEENT:    Right frontal abrasion  Neck:    [place] collar On ; ROM deferred; reposition collar to appropriate fit.  Instructed patient's wife on chin drop and appropriate position.  Motor: Moving all extremities.  No focal weakness  Station and Gait:             Per PT note 11/1-ambulated 300 feet independently with no AD  Extremities:   Left upper extremity in splint    Assessment/Plan     ASSESSMENT:      Syncope and collapse    Type 2 diabetes mellitus with hyperglycemia, with long-term current use of insulin (HCC)    Personal history of malignant neoplasm of prostate    Other hyperlipidemia    Acquired hypothyroidism    C4 cervical fracture (HCC)    Thumb fracture    Patient with only mild neck stiffness.  Tolerating collar.  Slightly loose on exam.  Repositioned and instructed patient and wife on proper fit as well as movement of chin rest for meals.  MRI study shows some mild edema at the superior endplate of C5 as well as the vertebral soft tissue C4/5 and paravertebrals C2-5.  No evidence of epidural hematoma.  He will need to remain in hard collar for 6 weeks.  No surgical intervention planned.  Patient advised to notify us for numbness tingling weakness new changes in balance, worsening or new neck pain.  He is advised on activity restrictions.  He may remove the collar for showers but is not to elicit any real range of motion of his neck.  He and his wife both verbalized understanding of the activity restrictions discussed.  We will see him in follow-up in 6 weeks with cervical x-rays.  He will call in the meantime with any questions or  "concerns.  Okay for discharge.    PLAN:   Okay for discharge from neurosurgical standpoint when medically cleared  Remain in hard collar at all times except for showers.  Do not flex, extend, bend, or twist neck while out of collar or shower.  No driving  No lift push pull more than 5 pounds.  No lifting overhead  Follow-up neurosurgery 6 weeks with x-rays  Return to ER or call neurosurgery for new onset extremity pain, numbness, tingling, weakness, bowel or bladder changes, new or uncontrolled neck pain    I discussed the patient's findings and my recommendations with patient, family, nursing staff and Dr. Peoples       LOS: 0 days       Summer Singh, APRN  11/1/2021  13:13 EDT    \"Dictated utilizing Dragon dictation\".      "

## 2021-11-01 NOTE — PLAN OF CARE
Pt admitted to [facility] 2/2 to a fall down 12 steps resulting in a C4 fx as well as a L thumb fx. OT orders placed for a fabrication of a L thumb splint. Pt completing ADL's and mobility in his hospital room with no hands on assist. Pt tolerated customization of a L thumb spica splint with no concerns. Education provided on the wearing schedule of the splint as well as light exercises in the LUE. Pt with plans to d/c home after his cardiac work up and d/c home with family assist as needed.     Patient was wearing a face mask during this therapy encounter. Therapist used appropriate personal protective equipment including mask, goggles and gloves.Mask used was standard procedure mask. Appropriate PPE was worn during the entire therapy session. Hand hygiene was completed before and after therapy session. Patient is not in enhanced droplet precautions.

## 2021-11-01 NOTE — THERAPY EVALUATION
Patient Name: Bennie Greene  : 1940    MRN: [id number]                              Today's Date: 2021       Admit Date: 10/31/2021    Visit Dx:     ICD-10-CM ICD-9-CM   1. Syncope and collapse  R55 780.2   2. Injury of head, initial encounter  S09.90XA 959.01   3. Strain of neck muscle, initial encounter  S16.1XXA 847.0   4. Multiple abrasions  T07.XXXA 919.0   5. Closed nondisplaced fracture of fourth cervical vertebra, unspecified fracture morphology, initial encounter ([facility])  S12.301A 805.04   6. Closed nondisplaced fracture of base of first metacarpal bone of left hand, unspecified fracture morphology, initial encounter  S62.235A 815.01     Patient Active Problem List   Diagnosis   • Type 2 diabetes mellitus with hyperglycemia, with long-term current use of insulin ([facility])   • Personal history of malignant neoplasm of prostate   • Other hyperlipidemia   • Acquired hypothyroidism   • Syncope and collapse   • C4 cervical fracture ([facility])   • Thumb fracture     Past Medical History:   Diagnosis Date   • Diabetes mellitus ([facility])    • Dyspepsia    • Erectile dysfunction    • Gastritis 2014    Marked gastritis by EGD- Dr. BRAXTON Garcia; H. Pylori +   • Gastroparesis     s/p gastrectomy   • GERD (gastroesophageal reflux disease)    • Personal history of malignant neoplasm of prostate      s/p XRT   • Pes cavus    • SCC (squamous cell carcinoma)     scalp/ neck     Past Surgical History:   Procedure Laterality Date   • CHOLECYSTECTOMY  2014    Dr. Ton Killian   • COLONOSCOPY  10/2014    Dr. Killian   • PARTIAL GASTRECTOMY  2014    Dr. Killian; for gastric outlet obstruction      General Information     Row Name 21 1006          Physical Therapy Time and Intention    Document Type discharge evaluation/summary  -CB     Mode of Treatment physical therapy  partial co-tx  -CB     Row Name 21 1006          General Information    Patient Profile Reviewed yes  -CB     Prior Level of Function  independent:; gait; transfer; bed mobility  -CB     Existing Precautions/Restrictions --  c-collar; per ortho MD- Light use of the L hand only  -CB     Barriers to Rehab none identified  -CB     Row Name 11/01/21 1006          Living Environment    Lives With spouse  -CB     Row Name 11/01/21 1006          Stairs Within Home, Primary    Stairs, Within Home, Primary 12  -CB     Stair Railings, Within Home, Primary railings safe and in good condition  -CB     Row Name 11/01/21 1006          Cognition    Orientation Status (Cognition) oriented x 4  -CB     Row Name 11/01/21 1006          Safety Issues, Functional Mobility    Impairments Affecting Function (Mobility) pain  -CB     Comment, Safety Issues/Impairments (Mobility) gait belt and non skid socks  -CB           User Key  (r) = Recorded By, (t) = Taken By, (c) = Cosigned By    Initials Name Provider Type    Esme Zapata PT Physical Therapist               Mobility     Row Name 11/01/21 1007          Bed Mobility    Bed Mobility supine-sit  -CB     Supine-Sit [place] (Bed Mobility) independent  -CB     Row Name 11/01/21 1007          Sit-Stand Transfer    Sit-Stand [place] (Transfers) independent  -CB     Assistive Device (Sit-Stand Transfers) --  no AD  -CB     Row Name 11/01/21 1007          Gait/Stairs (Locomotion)    [place] Level (Gait) independent  -CB     Assistive Device (Gait) --  no AD  -CB     Distance in Feet (Gait) 300ft  -CB     Comment (Gait/Stairs) no LOB or unsteadiness noted  -CB           User Key  (r) = Recorded By, (t) = Taken By, (c) = Cosigned By    Initials Name Provider Type    Esme Zapata, SAMANTHA Physical Therapist               Obj/Interventions     Row Name 11/01/21 1008          Range of Motion Comprehensive    General Range of Motion bilateral lower extremity ROM WFL  -CB     Row Name 11/01/21 1008          Strength Comprehensive (MMT)    Comment, General Manual Muscle Testing (MMT) Assessment no LE strength  deficits  -CB     Row Name 11/01/21 1008          Balance    Balance Assessment sitting static balance; sitting dynamic balance; standing static balance; standing dynamic balance  -CB     Static Sitting Balance WFL; unsupported; sitting, edge of bed  -CB     Dynamic Sitting Balance WFL; unsupported; sitting, edge of bed  -CB     Static Standing Balance WFL; unsupported; standing  -CB     Dynamic Standing Balance WFL; unsupported; standing  -CB     Row Name 11/01/21 1008          Sensory Assessment (Somatosensory)    Sensory Assessment (Somatosensory) LE sensation intact  -CB           User Key  (r) = Recorded By, (t) = Taken By, (c) = Cosigned By    Initials Name Provider Type    CB Esme Elizalde, PT Physical Therapist               Goals/Plan    No documentation.                Clinical Impression     Row Name 11/01/21 1008          Pain    Additional Documentation Pain Scale: Numbers Pre/Post-Treatment (Group)  -CB     Row Name 11/01/21 1008          Pain Scale: Numbers Pre/Post-Treatment    Pretreatment Pain Rating 4/10  -CB     Posttreatment Pain Rating 4/10  -CB     Pre/Posttreatment Pain Comment L thumb and buttock  -CB     Pain Intervention(s) Rest  -CB     Row Name 11/01/21 1008          Plan of Care Review    Plan of Care Reviewed With patient  -CB     Progress improving  -CB     Outcome Summary Patient is an 80 yo male who presented after a fall at home down steps. Pt with C4 fx and L thumb fx. CT head negative for intracranial hemorrhage. Pt to wear c-collar and L thumb splint. Per ortho MD Light use of the L hand only. He lives with his spouse and is ind at baseline without use of AD. Pt presents today with L buttock and thumb pain. He completed bed mobility and STS independently. He ambulated 300ft without AD independently with no LOB or unsteadiness noted. No acute PT needs. PT to sign off. Anticipate dc home with spouse.  -CB     Row Name 11/01/21 1008          Therapy Assessment/Plan (PT)     Patient/Family Therapy Goals Statement (PT) to go home  -CB     Row Name 11/01/21 1008          Positioning and Restraints    Pre-Treatment Position in bed  -CB     Post Treatment Position chair  -CB     In Chair with OT; with nsg  -CB           User Key  (r) = Recorded By, (t) = Taken By, (c) = Cosigned By    Initials Name Provider Type    Esme Zapata, SAMANTHA Physical Therapist               Outcome Measures     Row Name 11/01/21 1012          How much help from another person do you currently need...    Turning from your back to your side while in flat bed without using bedrails? 4  -CB     Moving from lying on back to sitting on the side of a flat bed without bedrails? 4  -CB     Moving to and from a bed to a chair (including a wheelchair)? 4  -CB     Standing up from a chair using your arms (e.g., wheelchair, bedside chair)? 4  -CB     Climbing 3-5 steps with a railing? 4  -CB     To walk in hospital room? 4  -CB     AM-PAC 6 Clicks Score (PT) 24  -CB     Row Name 11/01/21 1012          Functional Assessment    Outcome Measure Options AM-PAC 6 Clicks Basic Mobility (PT)  -CB           User Key  (r) = Recorded By, (t) = Taken By, (c) = Cosigned By    Initials Name Provider Type    Esme Zapata PT Physical Therapist                             Physical Therapy Education                 Title: PT OT SLP Therapies (In Progress)     Topic: Physical Therapy (In Progress)     Point: Mobility training (Done)     Learning Progress Summary           Patient Acceptance, E,TB, VU,DU by CB at 11/1/2021 1012                   Point: Home exercise program (Not Started)     Learner Progress:  Not documented in this visit.          Point: Body mechanics (Done)     Learning Progress Summary           Patient Acceptance, E,TB, VU,DU by CB at 11/1/2021 1012                   Point: Precautions (Done)     Learning Progress Summary           Patient Acceptance, E,TB, VU,DU by CB at 11/1/2021 1012                                User Key     Initials Effective Dates Name Provider Type Discipline     10/22/21 -  Esme Elizalde PT Physical Therapist PT              PT Recommendation and Plan     Plan of Care Reviewed With: patient  Progress: improving  Outcome Summary: Patient is an 82 yo male who presented after a fall at home down steps. Pt with C4 fx and L thumb fx. CT head negative for intracranial hemorrhage. Pt to wear c-collar and L thumb splint. Per ortho MD Light use of the L hand only. He lives with his spouse and is ind at baseline without use of AD. Pt presents today with L buttock and thumb pain. He completed bed mobility and STS independently. He ambulated 300ft without AD independently with no LOB or unsteadiness noted. No acute PT needs. PT to sign off. Anticipate dc home with spouse.     Time Calculation:    PT Charges     Row Name 11/01/21 1013             Time Calculation    Start Time 0846  -CB      Stop Time 0857  -CB      Time Calculation (min) 11 min  -CB      PT Received On 11/01/21  -CB            User Key  (r) = Recorded By, (t) = Taken By, (c) = Cosigned By    Initials Name Provider Type    CB Esme Elizalde, PT Physical Therapist              Therapy Charges for Today     Code Description Service Date Service Provider Modifiers Qty    09275897069 HC PT EVAL MOD COMPLEXITY 2 11/1/2021 Esme Elizalde PT GP 1          PT G-Codes  Outcome Measure Options: AM-PAC 6 Clicks Basic Mobility (PT)  AM-PAC 6 Clicks Score (PT): 24    Esme Elizalde PT  11/1/2021

## 2021-11-01 NOTE — PROGRESS NOTES
"    Patient Name: Bennie Greene  :1940  81 y.o.      Patient Care Team:  Travon Shah MD as PCP - General (Family Medicine)  Ramila Stanley MD as Consulting Physician (Ophthalmology)  Shila Killian MD as Surgeon (General Surgery)  Mata Wright MD as Consulting Physician (Dermatology)    Chief Complaint: follow up syncope/fall    Interval History: the more he thinks about it, the more he thinks he didn't pass out. He thinks he slipped. No light headed or dizziness today. He feels well.        Objective   Vital Signs  Temp:  [97.6 °F (36.4 °C)-99 °F (37.2 °C)] 98.5 °F (36.9 °C)  Heart Rate:  [63-78] 64  Resp:  [16-18] 18  BP: (129-154)/(67-85) 133/81    Intake/Output Summary (Last 24 hours) at 2021 0945  Last data filed at 2021 0905  Gross per 24 hour   Intake 600 ml   Output 400 ml   Net 200 ml     Flowsheet Rows      First Filed Value   Admission Height 177.8 cm (70\") Documented at 10/31/2021 0026   Admission Weight 78.7 kg (173 lb 8 oz) Documented at 10/31/2021 0821          Physical Exam:   General Appearance:    Alert, cooperative, in no acute distress   Lungs:     Clear to auscultation.  Normal respiratory effort and rate.      Heart:    Regular rhythm and normal rate, normal S1 and S2, no murmurs, gallops or rubs.     Chest Wall:    No abnormalities observed   Abdomen:     Soft, nontender, positive bowel sounds.     Extremities:   no cyanosis, clubbing or edema.  No marked joint deformities.  Adequate musculoskeletal strength.       Results Review:    Results from last 7 days   Lab Units 21  0651   SODIUM mmol/L 141   POTASSIUM mmol/L 3.9   CHLORIDE mmol/L 104   CO2 mmol/L 25.3   BUN mg/dL 15   CREATININE mg/dL 1.09   GLUCOSE mg/dL 117*   CALCIUM mg/dL 8.6     Results from last 7 days   Lab Units 10/31/21  0200   TROPONIN T ng/mL <0.010     Results from last 7 days   Lab Units 21  0651   WBC 10*3/mm3 6.67   HEMOGLOBIN g/dL 14.1   HEMATOCRIT % 43.1   PLATELETS " 10*3/mm3 173                           Medication Review:   insulin glargine, 20 Units, Subcutaneous, QAM  insulin lispro, 0-9 Units, Subcutaneous, 4x Daily With Meals & Nightly  levothyroxine, 50 mcg, Oral, Daily  pantoprazole, 40 mg, Oral, QAM              Assessment/Plan   1. Possible syncope, fall - echocardiogram pending, plan to discharge with Zio patch. Continue to monitor on telemetry while here. Discussed plan with patient.   2. RBBB - unchanged  3. Diabetes mellitus type II  4. C4 fracture - secondary to fall.     ALTAGRACIA Marsh  [facility]  11/01/21  09:45 EDT

## 2021-11-01 NOTE — THERAPY EVALUATION
Acute Care - Occupational Therapy Initial Evaluation  [facility]     Patient Name: Bennie Greene  : 1940  MRN: [id number]  Today's Date: 2021             Admit Date: 10/31/2021       ICD-10-CM ICD-9-CM   1. Syncope and collapse  R55 780.2   2. Injury of head, initial encounter  S09.90XA 959.01   3. Strain of neck muscle, initial encounter  S16.1XXA 847.0   4. Multiple abrasions  T07.XXXA 919.0   5. Closed nondisplaced fracture of fourth cervical vertebra, unspecified fracture morphology, initial encounter ([facility])  S12.301A 805.04   6. Closed nondisplaced fracture of base of first metacarpal bone of left hand, unspecified fracture morphology, initial encounter  S62.235A 815.01     Patient Active Problem List   Diagnosis   • Type 2 diabetes mellitus with hyperglycemia, with long-term current use of insulin ([facility])   • Personal history of malignant neoplasm of prostate   • Other hyperlipidemia   • Acquired hypothyroidism   • Syncope and collapse   • C4 cervical fracture ([facility])   • Thumb fracture     Past Medical History:   Diagnosis Date   • Diabetes mellitus ([facility])    • Dyspepsia    • Erectile dysfunction    • Gastritis 2014    Marked gastritis by EGD- Dr. BRAXTON Garcia; H. Pylori +   • Gastroparesis     s/p gastrectomy   • GERD (gastroesophageal reflux disease)    • Personal history of malignant neoplasm of prostate     2006 s/p XRT   • Pes cavus    • SCC (squamous cell carcinoma)     scalp/ neck     Past Surgical History:   Procedure Laterality Date   • CHOLECYSTECTOMY  2014    Dr. Ton Killian   • COLONOSCOPY  10/2014    Dr. Killian   • PARTIAL GASTRECTOMY  2014    Dr. Killian; for gastric outlet obstruction         OT ASSESSMENT FLOWSHEET (last 12 hours)     OT Evaluation and Treatment     Row Name 21 1012                   General Information    General Observations of Patient --  LUE open area dressings applied by RN  -RB                  Orthotics & Prosthetics Management    Orthosis  Location upper extremity orthosis; wrist hand orthosis  -RB        Additional Documentation Orthosis Location (Row)  -RB                  Upper Extremity Orthosis Management    Functional Design (Upper Extremity Orthosis) static orthosis  -RB        Therapeutic Indications (Upper Extremity Orthosis) pain management; fracture immobilization  -RB        Wearing Schedule (Upper Extremity Orthosis) remove for exercise; remove for hygiene/bathing  -RB        Orthosis Training (Upper Extremity Orthosis) patient  -RB        Compliance/Wearing Issues (Upper Extremity Orthosis) patient/caregiver comprehend strategies  -RB        Adjustment Needed/Outcome (Upper Extremity Orthosis) friction area(s) identified; further assessment/monitoring; further adjustment needed  -RB                  Wrist/Hand Orthosis Management    Type (Wrist/Hand Orthosis) left  -RB        Position (Wrist/Hand Orthosis) wrist position, extension 20 degrees  thumb spica  -RB                  Wound 10/31/21 0810 Right forehead Abrasion    Wound - Properties Group Placement Date: 10/31/21  -DL Placement Time: 0810  -DL Present on Hospital Admission: Y  -DL Side: Right  -DL Location: forehead  -DL Primary Wound Type: Abrasion  -DL        Retired Wound - Properties Group Date first assessed: 10/31/21  -DL Time first assessed: 0810  -DL Present on Hospital Admission: Y  -DL Side: Right  -DL Location: forehead  -DL Primary Wound Type: Abrasion  -DL                  Wound 10/31/21 0810 Left lateral arm Skin Tear    Wound - Properties Group Placement Date: 10/31/21  -DL Placement Time: 0810  -DL Present on Hospital Admission: Y  -DL Side: Left  -DL Orientation: lateral  -DL Location: arm  -DL Primary Wound Type: Skin tear  -DL        Retired Wound - Properties Group Date first assessed: 10/31/21  -DL Time first assessed: 0810  -DL Present on Hospital Admission: Y  -DL Side: Left  -DL Location: arm  -DL Primary Wound Type: Skin tear  -DL                   Therapy Assessment/Plan (OT)    Rehab Potential (OT) good, to achieve stated therapy goals  -RB        Criteria for Skilled Therapeutic Interventions Met (OT) yes; skilled treatment is necessary  -RB        Therapy Frequency (OT) 5 times/wk  -RB        Problem List (OT) --  orthosis  -RB        Planned Therapy Interventions (OT) orthotic fabrication/fitting/training; ROM/therapeutic exercise  -RB              User Key  (r) = Recorded By, (t) = Taken By, (c) = Cosigned By    Initials Name Effective Dates    DL Rayne Todd RN 09/24/19 -     RB Tracy Dela Cruz OT 06/16/21 -                        OT Recommendation and Plan  Planned Therapy Interventions (OT): ROM/therapeutic exercise, patient/caregiver education/training, occupation/activity based interventions, orthotic fabrication/fitting/training  Therapy Frequency (OT): 5 times/wk  Plan of Care Review  Plan of Care Reviewed With: patient  Progress: improving  Plan of Care Reviewed With: patient        Time Calculation:    Time Calculation- OT     Row Name 11/01/21 1008             Time Calculation- OT    OT Start Time 0846  -RB      OT Stop Time 0952  -RB      OT Time Calculation (min) 66 min  -RB      Total Timed Code Minutes- OT 55 minute(s)  -RB      OT Received On 11/01/21  -RB      OT - Next Appointment 11/02/21  -RB      OT Goal Re-Cert Due Date 11/15/21  -RB              Timed Charges    80389 - OT Therapeutic Exercise Minutes 10  -RB      25355 - OT Therapeutic Activity Minutes 15  -RB      90132 - OT Orthotic Management 30  -RB              Untimed Charges    OT Eval/Re-eval Minutes 10  -RB              Total Minutes    Timed Charges Total Minutes 55  -RB      Untimed Charges Total Minutes 10  -RB       Total Minutes 65  -RB            User Key  (r) = Recorded By, (t) = Taken By, (c) = Cosigned By    Initials Name Provider Type    RB Tracy Dela Cruz, OT Occupational Therapist              Therapy Charges for Today     Code Description Service  Date Service Provider Modifiers Qty    25412522672 HC OT EVAL MOD COMPLEXITY 2 11/1/2021 Tracy Dela Cruz OT GO 1    65041196153 HC OT THERAPEUTIC ACT EA 15 MIN 11/1/2021 Tracy Dela Cruz OT GO 1    47474715014 HC OT THER PROC EA 15 MIN 11/1/2021 Tracy Dela Cruz OT GO 1    77935719456 HC OT ORTHOTIC MGMT/TRAIN EA 15 MIN 11/1/2021 Tracy Dela Cruz OT GO 2               Tracy Dela Cruz OT  11/1/2021

## 2021-11-01 NOTE — PLAN OF CARE
Goal Outcome Evaluation:  Plan of Care Reviewed With: patient        Progress: no change  Outcome Summary: A/O, no complaints of pain, CCollar in place, splint to left hand, VSS, will continue to monitor.

## 2021-11-01 NOTE — OUTREACH NOTE
Prep Survey      Responses   [facility] patient discharged from? [place]   Is LACE score < 7 ? Yes   Emergency Room discharge w/ pulse ox? No   Eligibility [facility]   Date of Admission 10/31/21   Date of Discharge 11/01/21   Discharge Disposition Home or Self Care   Discharge diagnosis syncope and collapse   Does the patient have one of the following disease processes/diagnoses(primary or secondary)? Other   Does the patient have Home health ordered? No   Is there a DME ordered? No   Prep survey completed? Yes          Arlette Carrasquillo RN

## 2021-11-02 ENCOUNTER — TRANSITIONAL CARE MANAGEMENT TELEPHONE ENCOUNTER (OUTPATIENT)
Dept: CALL CENTER | Facility: HOSPITAL | Age: 81
End: 2021-11-02

## 2021-11-02 NOTE — OUTREACH NOTE
Call Center TCM Note      Responses   [facility] patient discharged from? [place]   Does the patient have one of the following disease processes/diagnoses(primary or secondary)? Other   TCM attempt successful? No   Unsuccessful attempts Attempt 1          Kelly Gonzalez MA    11/2/2021, 14:32 EDT

## 2021-11-02 NOTE — OUTREACH NOTE
Call Center TCM Note      Responses   [facility] patient discharged from? [place]   Does the patient have one of the following disease processes/diagnoses(primary or secondary)? Other   TCM attempt successful? No   Unsuccessful attempts Attempt 2          Kelly Gonzalez MA    11/2/2021, 16:06 EDT

## 2021-11-03 ENCOUNTER — TRANSITIONAL CARE MANAGEMENT TELEPHONE ENCOUNTER (OUTPATIENT)
Dept: CALL CENTER | Facility: HOSPITAL | Age: 81
End: 2021-11-03

## 2021-11-03 DIAGNOSIS — S12.301A CLOSED NONDISPLACED FRACTURE OF FOURTH CERVICAL VERTEBRA, UNSPECIFIED FRACTURE MORPHOLOGY, INITIAL ENCOUNTER (HCC): Primary | ICD-10-CM

## 2021-11-03 NOTE — OUTREACH NOTE
Call Center TCM Note      Responses   [facility] patient discharged from? [place]   Does the patient have one of the following disease processes/diagnoses(primary or secondary)? Other   TCM attempt successful? No   Unsuccessful attempts Attempt 3   Wrap up additional comments Unable to reach pt x 3 attempts for TCM CALL. Pt is not yet sched for TCM [facility] with PCP Dr Shah. This would need to be completed byb 11/15/2021.          Kelly Gonzalez MA    11/3/2021, 16:54 EDT

## 2021-11-04 ENCOUNTER — TELEPHONE (OUTPATIENT)
Dept: ENDOCRINOLOGY | Age: 81
End: 2021-11-04

## 2021-11-04 NOTE — TELEPHONE ENCOUNTER
New Medication Trulicity but his blood sugars were below 100 and they did not know if he should take it or not. Can we call back and advise?

## 2021-11-19 DIAGNOSIS — E11.9 TYPE 2 DIABETES MELLITUS WITHOUT COMPLICATION, WITH LONG-TERM CURRENT USE OF INSULIN (HCC): ICD-10-CM

## 2021-11-19 DIAGNOSIS — Z79.4 TYPE 2 DIABETES MELLITUS WITHOUT COMPLICATION, WITH LONG-TERM CURRENT USE OF INSULIN (HCC): ICD-10-CM

## 2021-11-19 RX ORDER — BLOOD SUGAR DIAGNOSTIC
STRIP MISCELLANEOUS
Refills: 11 | OUTPATIENT
Start: 2021-11-19

## 2021-11-24 LAB
MAXIMAL PREDICTED HEART RATE: 140 BPM
STRESS TARGET HR: 119 BPM

## 2021-11-24 PROCEDURE — 93248 EXT ECG>7D<15D REV&INTERPJ: CPT | Performed by: INTERNAL MEDICINE

## 2021-12-09 ENCOUNTER — HOSPITAL ENCOUNTER (OUTPATIENT)
Dept: GENERAL RADIOLOGY | Facility: HOSPITAL | Age: 81
Discharge: HOME | End: 2021-12-09
Admitting: NURSE PRACTITIONER

## 2021-12-09 DIAGNOSIS — S12.301A CLOSED NONDISPLACED FRACTURE OF FOURTH CERVICAL VERTEBRA, UNSPECIFIED FRACTURE MORPHOLOGY, INITIAL ENCOUNTER (HCC): ICD-10-CM

## 2021-12-09 PROCEDURE — 72040 X-RAY EXAM NECK SPINE 2-3 VW: CPT

## 2021-12-13 ENCOUNTER — TELEPHONE (OUTPATIENT)
Dept: NEUROSURGERY | Facility: CLINIC | Age: 81
End: 2021-12-13

## 2021-12-13 NOTE — PROGRESS NOTES
"Subjective   Patient ID: Bennie Greene is a 81 y.o. male is here today for hospital follow-up for C4 left transverse foramen and the tip of spinous process as well as C5 superior endplate edema with suspected minimal fracture after a fall/syncopal event that occurred on 10/30/2021.  He has been treated in a hard collar.  He presents with new x-rays.      History of Present Illness  Today Mr. Greene reports he has no neck pain. He denies any numbness and tingling. He also denies any other symptoms. No imbalance. He had Zio patch. He is awaiting results.  He is hoping to get his collar off today.    The following portions of the patient's history were reviewed and updated as appropriate: allergies, current medications and problem list.    Review of Systems   Musculoskeletal: Negative for neck pain.   Neurological: Negative for dizziness, weakness, numbness and headaches.   Psychiatric/Behavioral: Negative for sleep disturbance.       Objective     Vitals:    12/14/21 0823   BP: 132/84   Pulse: 84   Temp: 97.6 °F (36.4 °C)   SpO2: 94%   Weight: 78 kg (171 lb 15.3 oz)   Height: 177.8 cm (70\")     Body mass index is 24.67 kg/m².      Physical Exam  Vitals reviewed.   Constitutional:       Appearance: Normal appearance.   Neck:      Comments: Call Helvetia collar in place.  Slightly loose.  Pulmonary:      Effort: Pulmonary effort is normal.   Musculoskeletal:      Cervical back: No spasms or tenderness. No pain with movement. Normal range of motion.   Skin:     General: Skin is warm and dry.   Neurological:      General: No focal deficit present.      Mental Status: He is alert.      Coordination: Romberg Test normal.      Gait: Gait is intact.      Deep Tendon Reflexes: Strength normal.   Psychiatric:         Speech: Speech normal.       Neurologic Exam     Mental Status   Attention: normal. Concentration: normal.   Speech: speech is normal   Level of consciousness: alert  Knowledge: good.   Normal comprehension. "     Motor Exam   Muscle bulk: normal  Overall muscle tone: normal    Strength   Strength 5/5 throughout.     Sensory Exam   Right arm light touch: normal  Left arm light touch: normal    Gait, Coordination, and Reflexes     Gait  Gait: normal    Coordination   Romberg: negative    Reflexes   Right Castro: absent  Left Castro: absent      Assessment/Plan   Independent Review of Radiographic Studies:      I personally reviewed the images from the following studies.    Cervical x-rays-DDD C4/5.  Retrolisthesis C4 on 5 as seen previously on MRI.  Minimally visible C4 spinous process fracture.  Unable to assess the left C4 transverse foramen fracture.  No evidence of vertebral body height loss at C5.    Medical Decision Making:      Patient presents for follow-up of C4 and 5 fractures after fall/syncopal event.  He completed Zio patch but states he has not had any results from it.  Advised him to call PCP as he has not had a posthospitalization appointment.  He has had no further episodes of syncope.  Denies neck pain.  States that no point time has he had neck pain.  No numbness tingling weakness or imbalance.  No red flags on exam.  Nontender to palpation of the neck or with range of motion.  Cervical x-rays show degenerative changes.  Unable to fully assess the previous fractures but patient is currently asymptomatic.  Discussed with Dr. Peoples.  Patient has been in hard collar for 6 weeks, although he admittedly states he has not worn it at all times as advised.  Since he is not having any neck pain, we will release him from the collar at this time.  I did advise him to avoid any heavy lifting for at least 6 more weeks.  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  If he begins to have any neck pain or numbness, tingling, weakness of his extremities, he should replace his collar immediately and notify us for follow-up.  Otherwise, we will see  him back on a as needed basis.    Diagnoses and all orders for this visit:    1. Other closed nondisplaced fracture of fourth cervical vertebra with routine healing, subsequent encounter (Primary)      Return if symptoms worsen or fail to improve.

## 2021-12-14 ENCOUNTER — OFFICE VISIT (OUTPATIENT)
Dept: NEUROSURGERY | Facility: CLINIC | Age: 81
End: 2021-12-14

## 2021-12-14 VITALS
WEIGHT: 171.96 LBS | HEART RATE: 84 BPM | HEIGHT: 70 IN | OXYGEN SATURATION: 94 % | TEMPERATURE: 97.6 F | BODY MASS INDEX: 24.62 KG/M2 | DIASTOLIC BLOOD PRESSURE: 84 MMHG | SYSTOLIC BLOOD PRESSURE: 132 MMHG

## 2021-12-14 DIAGNOSIS — S12.391D OTHER CLOSED NONDISPLACED FRACTURE OF FOURTH CERVICAL VERTEBRA WITH ROUTINE HEALING, SUBSEQUENT ENCOUNTER: Primary | ICD-10-CM

## 2021-12-14 PROCEDURE — 99213 OFFICE O/P EST LOW 20 MIN: CPT | Performed by: NURSE PRACTITIONER

## 2022-01-06 ENCOUNTER — DOCUMENTATION (OUTPATIENT)
Dept: ENDOCRINOLOGY | Age: 82
End: 2022-01-06

## 2022-01-06 NOTE — PROGRESS NOTES
Benefits Investigation Summary    Prescription: Renewal     Dispensing pharmacy: [facility]     Copay amount: TRESIBA $117    PLAN: [facility]  BIN: [id number]  PCN: [id number]  RX GROUP: [id number]    Prior Auth and Med Assistance notes:

## 2022-01-11 ENCOUNTER — OFFICE VISIT (OUTPATIENT)
Dept: ENDOCRINOLOGY | Age: 82
End: 2022-01-11

## 2022-01-11 VITALS
OXYGEN SATURATION: 96 % | BODY MASS INDEX: 25.97 KG/M2 | WEIGHT: 181.4 LBS | DIASTOLIC BLOOD PRESSURE: 63 MMHG | SYSTOLIC BLOOD PRESSURE: 136 MMHG | HEART RATE: 62 BPM | HEIGHT: 70 IN

## 2022-01-11 DIAGNOSIS — E03.9 ACQUIRED HYPOTHYROIDISM: ICD-10-CM

## 2022-01-11 DIAGNOSIS — E11.65 TYPE 2 DIABETES MELLITUS WITH HYPERGLYCEMIA, WITH LONG-TERM CURRENT USE OF INSULIN: Primary | ICD-10-CM

## 2022-01-11 DIAGNOSIS — E78.2 HYPERLIPEMIA, MIXED: ICD-10-CM

## 2022-01-11 DIAGNOSIS — Z79.4 TYPE 2 DIABETES MELLITUS WITH HYPERGLYCEMIA, WITH LONG-TERM CURRENT USE OF INSULIN: Primary | ICD-10-CM

## 2022-01-11 PROCEDURE — 99214 OFFICE O/P EST MOD 30 MIN: CPT | Performed by: NURSE PRACTITIONER

## 2022-01-11 RX ORDER — METFORMIN HYDROCHLORIDE 500 MG/1
500 TABLET, EXTENDED RELEASE ORAL
Qty: 180 TABLET | Refills: 1 | Status: SHIPPED | OUTPATIENT
Start: 2022-01-11 | End: 2022-05-18

## 2022-01-11 RX ORDER — LEVOTHYROXINE SODIUM 0.05 MG/1
50 TABLET ORAL DAILY
Qty: 90 TABLET | Refills: 1 | Status: SHIPPED | OUTPATIENT
Start: 2022-01-11 | End: 2022-04-12

## 2022-01-11 RX ORDER — DULAGLUTIDE 3 MG/.5ML
3 INJECTION, SOLUTION SUBCUTANEOUS WEEKLY
Qty: 2 ML | Refills: 2 | Status: SHIPPED | OUTPATIENT
Start: 2022-01-11 | End: 2022-04-11

## 2022-01-11 RX ORDER — INSULIN DEGLUDEC 200 U/ML
40 INJECTION, SOLUTION SUBCUTANEOUS DAILY
Qty: 18 PEN | Refills: 1 | Status: SHIPPED | OUTPATIENT
Start: 2022-01-11 | End: 2022-04-12

## 2022-01-11 NOTE — PROGRESS NOTES
"Chief Complaint  Diabetes Mellitus (follow up)    Subjective          Bennie Greene presents to [facility]  History of Present Illness     I have reviewed PMH, allergies and medications UTD at this visit     Plan last visit:  Increase Tresiba to 40 units daily  Continue Metformin 500 mg twice daily  Add Trulicity 1.5 mg subcutaneous once a week.    a1c improving     Type 2 dm    Diagnosed about 6 years ago.   Today in clinic pt reports being on metformin 500mg BID, Tresiba 40u daily, trulicity 1.5mg weekly   Avg bg -<100  Checks BG - QAM  Dm retinopathy - no hx, Last eye exam - 8/27/21  Dm nephropathy - x  Dm neuropathy - x  CAD - x  CVA - x  Episodes of hypoglycemia - no   Pt is physically active. weight has been stable.   Pt tries to follow DM diet for most part.        Hypothyroidism  On levothyroxine 50 mcg oral daily  Reports feeling good, denies s/s of hypothyroidism- will repeat labs before next visit    Lab Results   Component Value Date    TSH 7.800 (H) 12/28/2021         Objective   Vital Signs:   /63   Pulse 62   Ht 177.8 cm (70\")   Wt 82.3 kg (181 lb 6.4 oz)   SpO2 96%   BMI 26.03 kg/m²     Physical Exam  Vitals reviewed.   Constitutional:       General: He is not in acute distress.  HENT:      Head: Normocephalic and atraumatic.   Cardiovascular:      Rate and Rhythm: Normal rate and regular rhythm.   Pulmonary:      Effort: Pulmonary effort is normal. No respiratory distress.   Musculoskeletal:         General: No signs of injury. Normal range of motion.      Cervical back: Normal range of motion and neck supple.   Skin:     General: Skin is warm and dry.   Neurological:      Mental Status: He is alert and oriented to person, place, and time. Mental status is at baseline.   Psychiatric:         Mood and Affect: Mood normal.         Behavior: Behavior normal.         Thought Content: Thought content normal.         Judgment: Judgment normal.      "         Result Review :   The following data was reviewed by: ALTAGRACIA Rogers on 01/11/2022:  Common labs    Common Labsle 10/31/21 10/31/21 10/31/21 10/31/21 11/1/21 11/1/21 11/1/21 12/28/21 12/28/21 12/28/21 12/28/21    0200 0200 0616 0616 0651 0651 0651 0907 0907 0907 0907   Glucose  233 (A)  227 (A)   117 (A) 69      BUN  18  15   15 16      Creatinine  1.11  0.88   1.09 1.06      eGFR Non  Am  64  83   65 66      eGFR  Am        76      Sodium  139  138   141 143      Potassium  4.4  3.9   3.9 4.2      Chloride  105  105   104 105      Calcium  8.7  8.5 (A)   8.6 9.4      Albumin  4.10     3.50       Total Bilirubin  0.4     0.6       Alkaline Phosphatase  132 (A)     103       AST (SGOT)  26     39       ALT (SGPT)  21     21       WBC 8.02  9.40  6.67         Hemoglobin 14.4  14.7  14.1         Hematocrit 42.5  44.2  43.1         Platelets 180  172  173         Total Cholesterol          197    Triglycerides          137    HDL Cholesterol          38 (A)    LDL Cholesterol           134 (A)    Hemoglobin A1C      9.50 (A)   8.3 (A)     Microalbumin, Urine           9.6   (A) Abnormal value       Comments are available for some flowsheets but are not being displayed.                     Assessment and Plan    Diagnoses and all orders for this visit:    1. Type 2 diabetes mellitus with hyperglycemia, with long-term current use of insulin (HCC) (Primary)  -     metFORMIN ER (GLUCOPHAGE-XR) 500 MG 24 hr tablet; Take 1 tablet by mouth Daily With Breakfast & Dinner.  Dispense: 180 tablet; Refill: 1  -     Insulin Degludec (Tresiba FlexTouch) 200 UNIT/ML solution pen-injector pen injection; Inject 40 Units under the skin into the appropriate area as directed Daily.  Dispense: 18 pen; Refill: 1  -     Dulaglutide (Trulicity) 3 MG/0.5ML solution pen-injector; Inject 0.5 mL under the skin into the appropriate area as directed 1 (One) Time Per Week for 90 days.  Dispense: 2 mL; Refill: 2  -     TSH;  Future  -     T4, Free; Future  -     Hemoglobin A1c; Future  -     Comprehensive Metabolic Panel; Future  -     Lipid Panel; Future  -     Microalbumin / Creatinine Urine Ratio - Urine, Clean Catch; Future    2. Hyperlipemia, mixed  -     Comprehensive Metabolic Panel; Future  -     Lipid Panel; Future    3. Acquired hypothyroidism  -     levothyroxine (Synthroid) 50 MCG tablet; Take 1 tablet by mouth Daily.  Dispense: 90 tablet; Refill: 1  -     TSH; Future  -     T4, Free; Future        Follow Up   No follow-ups on file.     a1c nearing goal, increase trulicity to 3mg weekly  Continue current tresiba and metformin dose  Continue current t4 dose and adjust next visit if TSH remains elevated  Not currently on statin, LDL above goal- add statin next visit id LDL > 100    Patient was given instructions and counseling regarding his condition or for health maintenance advice. Please see specific information pulled into the AVS if appropriate.     ALTAGRACIA Rogers

## 2022-02-10 DIAGNOSIS — Z79.4 TYPE 2 DIABETES MELLITUS WITHOUT COMPLICATION, WITH LONG-TERM CURRENT USE OF INSULIN: ICD-10-CM

## 2022-02-10 DIAGNOSIS — E11.9 TYPE 2 DIABETES MELLITUS WITHOUT COMPLICATION, WITH LONG-TERM CURRENT USE OF INSULIN: ICD-10-CM

## 2022-02-10 RX ORDER — BLOOD SUGAR DIAGNOSTIC
STRIP MISCELLANEOUS
Refills: 11 | OUTPATIENT
Start: 2022-02-10

## 2022-02-10 RX ORDER — PEN NEEDLE, DIABETIC 32GX 5/32"
NEEDLE, DISPOSABLE MISCELLANEOUS
Qty: 100 EACH | Refills: 3 | OUTPATIENT
Start: 2022-02-10

## 2022-02-15 ENCOUNTER — TELEPHONE (OUTPATIENT)
Dept: ENDOCRINOLOGY | Age: 82
End: 2022-02-15

## 2022-02-18 ENCOUNTER — TELEPHONE (OUTPATIENT)
Dept: ENDOCRINOLOGY | Age: 82
End: 2022-02-18

## 2022-02-18 DIAGNOSIS — Z79.4 TYPE 2 DIABETES MELLITUS WITHOUT COMPLICATION, WITH LONG-TERM CURRENT USE OF INSULIN: ICD-10-CM

## 2022-02-18 DIAGNOSIS — E11.9 TYPE 2 DIABETES MELLITUS WITHOUT COMPLICATION, WITH LONG-TERM CURRENT USE OF INSULIN: ICD-10-CM

## 2022-02-18 RX ORDER — PEN NEEDLE, DIABETIC 32GX 5/32"
NEEDLE, DISPOSABLE MISCELLANEOUS
Qty: 100 EACH | Refills: 3 | Status: SHIPPED | OUTPATIENT
Start: 2022-02-18 | End: 2023-02-17

## 2022-02-18 NOTE — TELEPHONE ENCOUNTER
Patient called needing pen needles said hes been off insulin for a week because he does not have any pen needles. Said they were told they would be ordered last tuesday    BD Pen Needle Nola U/F 32G X 4 MM [facility] (11/19/2020)    [id number] - [place], [place] - [address] - [phone]  - [phone] [address] [address]   Phone: [phone] Fax: [phone]

## 2022-03-29 ENCOUNTER — LAB (OUTPATIENT)
Dept: ENDOCRINOLOGY | Age: 82
End: 2022-03-29

## 2022-03-29 DIAGNOSIS — E03.9 ACQUIRED HYPOTHYROIDISM: ICD-10-CM

## 2022-03-29 DIAGNOSIS — E11.65 TYPE 2 DIABETES MELLITUS WITH HYPERGLYCEMIA, WITH LONG-TERM CURRENT USE OF INSULIN: ICD-10-CM

## 2022-03-29 DIAGNOSIS — E78.2 HYPERLIPEMIA, MIXED: ICD-10-CM

## 2022-03-29 DIAGNOSIS — Z79.4 TYPE 2 DIABETES MELLITUS WITH HYPERGLYCEMIA, WITH LONG-TERM CURRENT USE OF INSULIN: ICD-10-CM

## 2022-03-30 LAB
ALBUMIN SERPL-MCNC: 4.2 G/DL (ref 3.7–4.7)
ALBUMIN/CREAT UR: 15 MG/G CREAT (ref 0–29)
ALBUMIN/GLOB SERPL: 1.4 {RATIO} (ref 1.2–2.2)
ALP SERPL-CCNC: 151 IU/L (ref 44–121)
ALT SERPL-CCNC: 21 IU/L (ref 0–44)
AST SERPL-CCNC: 21 IU/L (ref 0–40)
BILIRUB SERPL-MCNC: 0.5 MG/DL (ref 0–1.2)
BUN SERPL-MCNC: 17 MG/DL (ref 8–27)
BUN/CREAT SERPL: 15 (ref 10–24)
CALCIUM SERPL-MCNC: 9.3 MG/DL (ref 8.6–10.2)
CHLORIDE SERPL-SCNC: 103 MMOL/L (ref 96–106)
CHOLEST SERPL-MCNC: 188 MG/DL (ref 100–199)
CO2 SERPL-SCNC: 26 MMOL/L (ref 20–29)
CREAT SERPL-MCNC: 1.14 MG/DL (ref 0.76–1.27)
CREAT UR-MCNC: 122 MG/DL
EGFRCR SERPLBLD CKD-EPI 2021: 65 ML/MIN/1.73
GLOBULIN SER CALC-MCNC: 3 G/DL (ref 1.5–4.5)
GLUCOSE SERPL-MCNC: 100 MG/DL (ref 65–99)
HBA1C MFR BLD: 9.2 % (ref 4.8–5.6)
HDLC SERPL-MCNC: 36 MG/DL
IMP & REVIEW OF LAB RESULTS: NORMAL
LDLC SERPL CALC-MCNC: 138 MG/DL (ref 0–99)
MICROALBUMIN UR-MCNC: 17.8 UG/ML
POTASSIUM SERPL-SCNC: 4.2 MMOL/L (ref 3.5–5.2)
PROT SERPL-MCNC: 7.2 G/DL (ref 6–8.5)
SODIUM SERPL-SCNC: 142 MMOL/L (ref 134–144)
T4 FREE SERPL-MCNC: 1.32 NG/DL (ref 0.82–1.77)
TRIGL SERPL-MCNC: 75 MG/DL (ref 0–149)
TSH SERPL DL<=0.005 MIU/L-ACNC: 4.97 UIU/ML (ref 0.45–4.5)
VLDLC SERPL CALC-MCNC: 14 MG/DL (ref 5–40)

## 2022-04-07 ENCOUNTER — OFFICE VISIT (OUTPATIENT)
Dept: INTERNAL MEDICINE | Facility: CLINIC | Age: 82
End: 2022-04-07

## 2022-04-07 VITALS
RESPIRATION RATE: 18 BRPM | DIASTOLIC BLOOD PRESSURE: 78 MMHG | WEIGHT: 180 LBS | TEMPERATURE: 98.4 F | BODY MASS INDEX: 25.77 KG/M2 | OXYGEN SATURATION: 94 % | SYSTOLIC BLOOD PRESSURE: 133 MMHG | HEART RATE: 69 BPM | HEIGHT: 70 IN

## 2022-04-07 DIAGNOSIS — Z00.00 MEDICARE ANNUAL WELLNESS VISIT, SUBSEQUENT: Primary | ICD-10-CM

## 2022-04-07 PROCEDURE — 1126F AMNT PAIN NOTED NONE PRSNT: CPT | Performed by: NURSE PRACTITIONER

## 2022-04-07 PROCEDURE — 1159F MED LIST DOCD IN RCRD: CPT | Performed by: NURSE PRACTITIONER

## 2022-04-07 PROCEDURE — 1170F FXNL STATUS ASSESSED: CPT | Performed by: NURSE PRACTITIONER

## 2022-04-07 PROCEDURE — G0439 PPPS, SUBSEQ VISIT: HCPCS | Performed by: NURSE PRACTITIONER

## 2022-04-07 NOTE — PROGRESS NOTES
The ABCs of the Annual Wellness Visit  Subsequent Medicare Wellness Visit    Chief Complaint   Patient presents with   • Medicare Wellness-subsequent     Pt presents here today for a medicare wellness visit.      Subjective    History of Present Illness:  Bennie Greene is a 81 y.o. male who presents for a Subsequent Medicare Wellness Visit.    The following portions of the patient's history were reviewed and   updated as appropriate: allergies, current medications, past family history, past medical history, past social history, past surgical history and problem list.    Compared to one year ago, the patient feels his physical   health is the same.    Compared to one year ago, the patient feels his mental   health is the same.    Recent Hospitalizations:  This patient has had a [facility] admission record on file within the last 365 days.    Current Medical Providers:  Patient Care Team:  Travon Shah MD as PCP - General (Family Medicine)  Ramila Stanley MD as Consulting Physician (Ophthalmology)  Shila Killian MD as Surgeon (General Surgery)  Mata Wright MD as Consulting Physician (Dermatology)    Outpatient Medications Prior to Visit   Medication Sig Dispense Refill   • Afshin Microlet Lancets lancets USE TO TEST BLOOD SUGAR ONE TIME DAILY. 100 each 20   • BD Pen Needle Nola U/F 32G X 4 MM misc Use with insulin every day 100 each 3   • Blood Glucose Monitoring Suppl (BLOOD GLUCOSE MONITOR SYSTEM) w/Device kit 1 each 2 (Two) Times a Day. 1 each 0   • Dulaglutide (Trulicity) 3 MG/0.5ML solution pen-injector Inject 0.5 mL under the skin into the appropriate area as directed 1 (One) Time Per Week for 90 days. 2 mL 2   • glucose blood test strip 1 each by Other route 2 (Two) Times a Day. Use as instructed 200 each 11   • Insulin Degludec (Tresiba FlexTouch) 200 UNIT/ML solution pen-injector pen injection Inject 40 Units under the skin into the appropriate area as directed Daily. 18 pen 1   •  "levothyroxine (Synthroid) 50 MCG tablet Take 1 tablet by mouth Daily. 90 tablet 1   • metFORMIN ER (GLUCOPHAGE-XR) 500 MG 24 hr tablet Take 1 tablet by mouth Daily With Breakfast & Dinner. 180 tablet 1   • omeprazole (PriLOSEC) 20 MG capsule Take 20 mg by mouth daily.     • acetaminophen (TYLENOL) 325 MG tablet Take 2 tablets by mouth Every 4 (Four) Hours As Needed for Mild Pain .       No facility-administered medications prior to visit.       No opioid medication identified on active medication list. I have reviewed chart for other potential  high risk medication/s and harmful drug interactions in the elderly.          Aspirin is not on active medication list.  Aspirin use is not indicated based on review of current medical condition/s. Risk of harm outweighs potential benefits.  .    Patient Active Problem List   Diagnosis   • Type 2 diabetes mellitus with hyperglycemia, with long-term current use of insulin (HCC)   • Personal history of malignant neoplasm of prostate   • Other hyperlipidemia   • Acquired hypothyroidism   • Syncope and collapse   • C4 cervical fracture (HCC)   • Thumb fracture     Advance Care Planning  Advance Directive is not on file.  ACP discussion was declined by the patient. Patient does not have an advance directive, information provided.          Objective    Vitals:    04/07/22 1002   BP: 133/78   BP Location: Left arm   Patient Position: Sitting   Cuff Size: Large Adult   Pulse: 69   Resp: 18   Temp: 98.4 °F (36.9 °C)   SpO2: 94%   Weight: 81.6 kg (180 lb)   Height: 177.8 cm (70\")   PainSc: 0-No pain     BMI Readings from Last 1 Encounters:   04/07/22 25.83 kg/m²   BMI is above normal parameters. Recommendations include: exercise counseling    Does the patient have evidence of cognitive impairment? No    Physical Exam  Vitals and nursing note reviewed.   Constitutional:       Appearance: Normal appearance.   HENT:      Nose: Nose normal.      Mouth/Throat:      Mouth: Mucous membranes " are moist.   Eyes:      Pupils: Pupils are equal, round, and reactive to light.   Cardiovascular:      Rate and Rhythm: Normal rate and regular rhythm.      Pulses: Normal pulses.      Heart sounds: Normal heart sounds.      Comments: No peripheral edema noted.  Pulmonary:      Effort: Pulmonary effort is normal. No respiratory distress.      Breath sounds: Normal breath sounds. No stridor. No wheezing, rhonchi or rales.   Chest:      Chest wall: No tenderness.   Musculoskeletal:         General: Normal range of motion.   Skin:     General: Skin is warm.   Neurological:      Mental Status: He is alert and oriented to person, place, and time.   Psychiatric:         Behavior: Behavior normal.       Lab Results   Component Value Date    CHLPL 188 03/29/2022    TRIG 75 03/29/2022    HDL 36 (L) 03/29/2022     (H) 03/29/2022    VLDL 14 03/29/2022    HGBA1C 9.2 (H) 03/29/2022            HEALTH RISK ASSESSMENT    Smoking Status:  Social History     Tobacco Use   Smoking Status Former Smoker   • Years: 3.00   Smokeless Tobacco Never Used     Alcohol Consumption:  Social History     Substance and Sexual Activity   Alcohol Use No     Fall Risk Screen:    STEADI Fall Risk Assessment was completed, and patient is at LOW risk for falls.Assessment completed on:4/7/2022    Depression Screening:  PHQ-2/PHQ-9 Depression Screening 4/7/2022   Retired PHQ-9 Total Score -   Retired Total Score -   Little Interest or Pleasure in Doing Things 0-->not at all   Feeling Down, Depressed or Hopeless 0-->not at all   PHQ-9: Brief Depression Severity Measure Score 0       Health Habits and Functional and Cognitive Screening:  Functional & Cognitive Status 4/7/2022   Do you have difficulty preparing food and eating? No   Do you have difficulty bathing yourself, getting dressed or grooming yourself? No   Do you have difficulty using the toilet? No   Do you have difficulty moving around from place to place? No   Do you have trouble with steps  or getting out of a bed or a chair? No   Current Diet Well Balanced Diet   Dental Exam Up to date   Eye Exam Up to date   Exercise (times per week) 5 times per week   Current Exercises Include Walking   Current Exercise Activities Include -   Do you need help using the phone?  No   Are you deaf or do you have serious difficulty hearing?  No   Do you need help with transportation? No   Do you need help shopping? No   Do you need help preparing meals?  No   Do you need help with housework?  Yes   Do you need help with laundry? Yes   Do you need help taking your medications? No   Do you need help managing money? No   Do you ever drive or ride in a car without wearing a seat belt? No   Have you felt unusual stress, anger or loneliness in the last month? No   Who do you live with? Spouse   If you need help, do you have trouble finding someone available to you? No   Have you been bothered in the last four weeks by sexual problems? No   Do you have difficulty concentrating, remembering or making decisions? No       Age-appropriate Screening Schedule:  Refer to the list below for future screening recommendations based on patient's age, sex and/or medical conditions. Orders for these recommended tests are listed in the plan section. The patient has been provided with a written plan.    Health Maintenance   Topic Date Due   • ZOSTER VACCINE (1 of 2) Never done   • INFLUENZA VACCINE  08/01/2022   • DIABETIC EYE EXAM  08/27/2022   • HEMOGLOBIN A1C  09/29/2022   • LIPID PANEL  03/29/2023   • URINE MICROALBUMIN  03/29/2023   • TDAP/TD VACCINES (2 - Tdap) 06/27/2028              Assessment/Plan   CMS Preventative Services Quick Reference  Risk Factors Identified During Encounter  None Identified  The above risks/problems have been discussed with the patient.  Follow up actions/plans if indicated are seen below in the Assessment/Plan Section.  Pertinent information has been shared with the patient in the After Visit  Summary.    Diagnoses and all orders for this visit:    1. Medicare annual wellness visit, subsequent (Primary)    Patient overall is doing very well.  Patient follows Dr. Parham for his history of type 2 diabetes mellitus.  Patient has no problems on today's office visit.  Patient will keep his activity level up along with his current diet.  Patient is doing wonderful with no complaints.    Follow Up:   Return in about 6 months (around 10/7/2022).     An After Visit Summary and PPPS were made available to the patient.        I spent 30 minutes caring for Bennie on this date of service. This time includes time spent by me in the following activities:preparing for the visit, reviewing tests, performing a medically appropriate examination and/or evaluation , counseling and educating the patient/family/caregiver, referring and communicating with other health care professionals , documenting information in the medical record, independently interpreting results and communicating that information with the patient/family/caregiver and care coordination

## 2022-04-12 ENCOUNTER — OFFICE VISIT (OUTPATIENT)
Dept: ENDOCRINOLOGY | Age: 82
End: 2022-04-12

## 2022-04-12 VITALS
WEIGHT: 177.2 LBS | HEIGHT: 70 IN | HEART RATE: 64 BPM | DIASTOLIC BLOOD PRESSURE: 80 MMHG | BODY MASS INDEX: 25.37 KG/M2 | OXYGEN SATURATION: 94 % | SYSTOLIC BLOOD PRESSURE: 132 MMHG

## 2022-04-12 DIAGNOSIS — E03.9 ACQUIRED HYPOTHYROIDISM: ICD-10-CM

## 2022-04-12 DIAGNOSIS — Z79.4 TYPE 2 DIABETES MELLITUS WITH HYPERGLYCEMIA, WITH LONG-TERM CURRENT USE OF INSULIN: Primary | ICD-10-CM

## 2022-04-12 DIAGNOSIS — E78.5 HYPERLIPIDEMIA ASSOCIATED WITH TYPE 2 DIABETES MELLITUS: ICD-10-CM

## 2022-04-12 DIAGNOSIS — E11.69 HYPERLIPIDEMIA ASSOCIATED WITH TYPE 2 DIABETES MELLITUS: ICD-10-CM

## 2022-04-12 DIAGNOSIS — E11.65 TYPE 2 DIABETES MELLITUS WITH HYPERGLYCEMIA, WITH LONG-TERM CURRENT USE OF INSULIN: Primary | ICD-10-CM

## 2022-04-12 PROCEDURE — 99214 OFFICE O/P EST MOD 30 MIN: CPT | Performed by: INTERNAL MEDICINE

## 2022-04-12 RX ORDER — LEVOTHYROXINE SODIUM 0.07 MG/1
75 TABLET ORAL DAILY
Qty: 30 TABLET | Refills: 11 | Status: SHIPPED | OUTPATIENT
Start: 2022-04-12 | End: 2022-12-02

## 2022-04-12 RX ORDER — INSULIN DEGLUDEC 200 U/ML
50 INJECTION, SOLUTION SUBCUTANEOUS DAILY
Qty: 18 PEN | Refills: 1 | Status: SHIPPED | OUTPATIENT
Start: 2022-04-12 | End: 2022-05-18

## 2022-04-12 NOTE — PROGRESS NOTES
"Chief Complaint  Chief Complaint   Patient presents with   • Diabetes     Type 2        Subjective          History of Present Illness    Bennie Greene 81 y.o. presents with Type 2 dm as a F/u.     Type 2 dm - Diagnosed about 8 - 9  years ago.   Today in clinic pt reports being on Tresiba 40 units daily in the am, trulicity 3 mg subq once a week, metformin 500 mg po bid with BF and dinner.   FBG - 90 - 120   Pre meals - x  Checks BG - once a day  Sensor - x  Dm retinopathy -x ,Last eye exam - oct 2021  Dm nephropathy - x  Dm neuropathy -x ,Dm neuropathy meds - x  CAD -x  CVA -x  Episodes of hypoglycemia - x  Pt is physically active. weight has been stable.   Pt tries to follow DM diet for most part.     Hypothyroidism - levothyroxine 50 mcg oral daily.     #pt hasnt been taking trulicity regularly.     Reviewed primary care physician's/consulting physician documentation and lab results         I have reviewed the patient's allergies, medicines, past medical hx, family hx and social hx in detail.    Objective   Vital Signs:   /80   Pulse 64   Ht 177.8 cm (70\")   Wt 80.4 kg (177 lb 3.2 oz)   SpO2 94%   BMI 25.43 kg/m²   Physical Exam   General appearance - no distress  Eyes- anicteric sclera  Ear nose and throat-external ears and nose normal.    Respiratory-normal chest on inspection.  No respiratory distress noted.  Skin-no rashes.  Neuro-alert and oriented x3            Result Review :   The following data was reviewed by: Grecia Parham MD on 04/12/2022:  Lab on 03/29/2022   Component Date Value Ref Range Status   • Creatinine, Urine 03/29/2022 122.0  Not Estab. mg/dL Final   • Microalbumin, Urine 03/29/2022 17.8  Not Estab. ug/mL Final   • Microalbumin/Creatinine Ratio 03/29/2022 15  0 - 29 mg/g creat Final    Comment:                        Normal:                0 -  29                         Moderately increased: 30 - 300                         Severely increased:       >300     • Total " Cholesterol 03/29/2022 188  100 - 199 mg/dL Final   • Triglycerides 03/29/2022 75  0 - 149 mg/dL Final   • HDL Cholesterol 03/29/2022 36 (A) >39 mg/dL Final   • VLDL Cholesterol Byron 03/29/2022 14  5 - 40 mg/dL Final   • LDL Chol Calc (NIH) 03/29/2022 138 (A) 0 - 99 mg/dL Final   • Glucose 03/29/2022 100 (A) 65 - 99 mg/dL Final   • BUN 03/29/2022 17  8 - 27 mg/dL Final   • Creatinine 03/29/2022 1.14  0.76 - 1.27 mg/dL Final   • EGFR Result 03/29/2022 65  >59 mL/min/1.73 Final   • BUN/Creatinine Ratio 03/29/2022 15  10 - 24 Final   • Sodium 03/29/2022 142  134 - 144 mmol/L Final   • Potassium 03/29/2022 4.2  3.5 - 5.2 mmol/L Final   • Chloride 03/29/2022 103  96 - 106 mmol/L Final   • Total CO2 03/29/2022 26  20 - 29 mmol/L Final   • Calcium 03/29/2022 9.3  8.6 - 10.2 mg/dL Final   • Total Protein 03/29/2022 7.2  6.0 - 8.5 g/dL Final   • Albumin 03/29/2022 4.2  3.7 - 4.7 g/dL Final   • Globulin 03/29/2022 3.0  1.5 - 4.5 g/dL Final   • A/G Ratio 03/29/2022 1.4  1.2 - 2.2 Final   • Total Bilirubin 03/29/2022 0.5  0.0 - 1.2 mg/dL Final   • Alkaline Phosphatase 03/29/2022 151 (A) 44 - 121 IU/L Final   • AST (SGOT) 03/29/2022 21  0 - 40 IU/L Final   • ALT (SGPT) 03/29/2022 21  0 - 44 IU/L Final   • Hemoglobin A1C 03/29/2022 9.2 (A) 4.8 - 5.6 % Final    Comment:          Prediabetes: 5.7 - 6.4           Diabetes: >6.4           Glycemic control for adults with diabetes: <7.0     • Free T4 03/29/2022 1.32  0.82 - 1.77 ng/dL Final   • TSH 03/29/2022 4.970 (A) 0.450 - 4.500 uIU/mL Final   • Interpretation 03/29/2022 Note   Final    Supplemental report is available.     Data reviewed: PCP notes       Results Review:    I reviewed the patient's new clinical results.     Assessment and Plan    Problem List Items Addressed This Visit        Other    Type 2 diabetes mellitus with hyperglycemia, with long-term current use of insulin (HCC) - Primary (Chronic)    Relevant Medications    Insulin Degludec (Tresiba FlexTouch) 200 UNIT/ML  "solution pen-injector pen injection    Other Relevant Orders    Basic Metabolic Panel    Hemoglobin A1c    Lipid Panel    TSH    T4, Free    Acquired hypothyroidism (Chronic)    Relevant Medications    levothyroxine (Synthroid) 75 MCG tablet    Other Relevant Orders    Basic Metabolic Panel    Hemoglobin A1c    Lipid Panel    TSH    T4, Free      Other Visit Diagnoses     Hyperlipidemia associated with type 2 diabetes mellitus (HCC)        Relevant Medications    Insulin Degludec (Tresiba FlexTouch) 200 UNIT/ML solution pen-injector pen injection    Other Relevant Orders    Basic Metabolic Panel    Hemoglobin A1c    Lipid Panel    TSH    T4, Free        Type 2 diabetes mellitus-uncontrolled with hyperglycemia  Continue Tresiba 40 units daily  Continue Metformin 500 mg twice daily with meals  Educated the patient and emphasized the patient the importance of being on the Trulicity.    Holding off on making medication adjustment as he has not been compliant with the Trulicity.    Patient is willing to start taking this medication more regularly now.    Hypothyroidism  Levels are not stable  Increase the dosage to 75 mcg oral daily.    Interpreted the blood work-up/imaging results performed by the primary care/consulting physician -    Refills sent to pharmacy    Follow Up     Patient was given instructions and counseling regarding her condition or for health maintenance advice. Please see specific information pulled into the AVS if appropriate.       Thank you for asking me to see your patient, Bennie Greene in consultation.         Grecia Parham MD  04/12/22      EMR Dragon / transcription disclaimer:     \"Dictated utilizing Dragon dictation\".         "

## 2022-05-16 DIAGNOSIS — E11.65 TYPE 2 DIABETES MELLITUS WITH HYPERGLYCEMIA, WITH LONG-TERM CURRENT USE OF INSULIN: ICD-10-CM

## 2022-05-16 DIAGNOSIS — Z79.4 TYPE 2 DIABETES MELLITUS WITHOUT COMPLICATION, WITH LONG-TERM CURRENT USE OF INSULIN: ICD-10-CM

## 2022-05-16 DIAGNOSIS — E11.9 TYPE 2 DIABETES MELLITUS WITHOUT COMPLICATION, WITH LONG-TERM CURRENT USE OF INSULIN: ICD-10-CM

## 2022-05-16 DIAGNOSIS — Z79.4 TYPE 2 DIABETES MELLITUS WITH HYPERGLYCEMIA, WITH LONG-TERM CURRENT USE OF INSULIN: ICD-10-CM

## 2022-05-16 DIAGNOSIS — E03.9 ACQUIRED HYPOTHYROIDISM: ICD-10-CM

## 2022-05-16 RX ORDER — BLOOD SUGAR DIAGNOSTIC
STRIP MISCELLANEOUS
Refills: 11 | OUTPATIENT
Start: 2022-05-16

## 2022-05-18 RX ORDER — INSULIN DEGLUDEC 200 U/ML
INJECTION, SOLUTION SUBCUTANEOUS
Qty: 18 PEN | Refills: 0 | Status: SHIPPED | OUTPATIENT
Start: 2022-05-18 | End: 2022-09-30

## 2022-05-18 RX ORDER — METFORMIN HYDROCHLORIDE 500 MG/1
500 TABLET, EXTENDED RELEASE ORAL
Qty: 180 TABLET | Refills: 1 | Status: SHIPPED | OUTPATIENT
Start: 2022-05-18 | End: 2022-07-20

## 2022-05-18 RX ORDER — LEVOTHYROXINE SODIUM 0.05 MG/1
TABLET ORAL
Qty: 90 TABLET | Refills: 1 | Status: SHIPPED | OUTPATIENT
Start: 2022-05-18 | End: 2022-09-30

## 2022-05-18 NOTE — TELEPHONE ENCOUNTER
Rx Refill Note  Requested Prescriptions     Pending Prescriptions Disp Refills    metFORMIN ER (GLUCOPHAGE-XR) 500 MG 24 hr tablet [Pharmacy Med Name: METFORMIN HCL  MG TABLET] 180 tablet 1     Sig: TAKE 1 TABLET BY MOUTH DAILY WITH BREAKFAST & DINNER.    levothyroxine (SYNTHROID, LEVOTHROID) 50 MCG tablet [Pharmacy Med Name: LEVOTHYROXINE 50 MCG TABLET] 90 tablet 1     Sig: TAKE 1 TABLET BY MOUTH EVERY DAY    Tresiba FlexTouch 200 UNIT/ML solution pen-injector pen injection [Pharmacy Med Name: TRESIBA FLEXTOUCH 200 UNIT/ML]  1     Sig: INJECT 40 UNITS UNDER THE SKIN INTO THE APPROPRIATE AREA AS DIRECTED DAILY      Last office visit with prescribing clinician: 1/11/2022      Next office visit with prescribing clinician: 7/20/2022            Jeniffer Peña  05/18/22, 08:19 EDT

## 2022-06-01 ENCOUNTER — TELEPHONE (OUTPATIENT)
Dept: ENDOCRINOLOGY | Age: 82
End: 2022-06-01

## 2022-06-01 DIAGNOSIS — Z79.4 TYPE 2 DIABETES MELLITUS WITHOUT COMPLICATION, WITH LONG-TERM CURRENT USE OF INSULIN: Primary | ICD-10-CM

## 2022-06-01 DIAGNOSIS — E11.9 TYPE 2 DIABETES MELLITUS WITHOUT COMPLICATION, WITH LONG-TERM CURRENT USE OF INSULIN: Primary | ICD-10-CM

## 2022-06-01 RX ORDER — LANCETS 30 GAUGE
EACH MISCELLANEOUS
Qty: 100 EACH | Refills: 4 | Status: SHIPPED | OUTPATIENT
Start: 2022-06-01

## 2022-06-01 NOTE — TELEPHONE ENCOUNTER
PATIENT NEEDS A REFILL ON Afshin Microlet Lancets lancets     SEND TO [facility] IN [place]

## 2022-06-07 DIAGNOSIS — E11.9 TYPE 2 DIABETES MELLITUS WITHOUT COMPLICATION, WITH LONG-TERM CURRENT USE OF INSULIN: ICD-10-CM

## 2022-06-07 DIAGNOSIS — Z79.4 TYPE 2 DIABETES MELLITUS WITHOUT COMPLICATION, WITH LONG-TERM CURRENT USE OF INSULIN: ICD-10-CM

## 2022-06-07 RX ORDER — BLOOD SUGAR DIAGNOSTIC
STRIP MISCELLANEOUS
Refills: 11 | OUTPATIENT
Start: 2022-06-07

## 2022-07-06 ENCOUNTER — LAB (OUTPATIENT)
Dept: ENDOCRINOLOGY | Age: 82
End: 2022-07-06

## 2022-07-06 DIAGNOSIS — E11.69 HYPERLIPIDEMIA ASSOCIATED WITH TYPE 2 DIABETES MELLITUS: ICD-10-CM

## 2022-07-06 DIAGNOSIS — E78.5 HYPERLIPIDEMIA ASSOCIATED WITH TYPE 2 DIABETES MELLITUS: ICD-10-CM

## 2022-07-06 DIAGNOSIS — Z79.4 TYPE 2 DIABETES MELLITUS WITH HYPERGLYCEMIA, WITH LONG-TERM CURRENT USE OF INSULIN: ICD-10-CM

## 2022-07-06 DIAGNOSIS — E03.9 ACQUIRED HYPOTHYROIDISM: ICD-10-CM

## 2022-07-06 DIAGNOSIS — E11.65 TYPE 2 DIABETES MELLITUS WITH HYPERGLYCEMIA, WITH LONG-TERM CURRENT USE OF INSULIN: ICD-10-CM

## 2022-07-07 LAB
BUN SERPL-MCNC: 22 MG/DL (ref 8–27)
BUN/CREAT SERPL: 19 (ref 10–24)
CALCIUM SERPL-MCNC: 9.3 MG/DL (ref 8.6–10.2)
CHLORIDE SERPL-SCNC: 104 MMOL/L (ref 96–106)
CHOLEST SERPL-MCNC: 179 MG/DL (ref 100–199)
CO2 SERPL-SCNC: 26 MMOL/L (ref 20–29)
CREAT SERPL-MCNC: 1.16 MG/DL (ref 0.76–1.27)
EGFRCR SERPLBLD CKD-EPI 2021: 64 ML/MIN/1.73
GLUCOSE SERPL-MCNC: 140 MG/DL (ref 65–99)
HBA1C MFR BLD: 8.5 % (ref 4.8–5.6)
HDLC SERPL-MCNC: 35 MG/DL
IMP & REVIEW OF LAB RESULTS: NORMAL
LDLC SERPL CALC-MCNC: 122 MG/DL (ref 0–99)
POTASSIUM SERPL-SCNC: 4.4 MMOL/L (ref 3.5–5.2)
SODIUM SERPL-SCNC: 143 MMOL/L (ref 134–144)
T4 FREE SERPL-MCNC: 1.36 NG/DL (ref 0.82–1.77)
TRIGL SERPL-MCNC: 118 MG/DL (ref 0–149)
TSH SERPL DL<=0.005 MIU/L-ACNC: 3.77 UIU/ML (ref 0.45–4.5)
VLDLC SERPL CALC-MCNC: 22 MG/DL (ref 5–40)

## 2022-07-13 ENCOUNTER — DOCUMENTATION (OUTPATIENT)
Dept: ENDOCRINOLOGY | Age: 82
End: 2022-07-13

## 2022-07-20 ENCOUNTER — OFFICE VISIT (OUTPATIENT)
Dept: ENDOCRINOLOGY | Age: 82
End: 2022-07-20

## 2022-07-20 VITALS
HEIGHT: 70 IN | DIASTOLIC BLOOD PRESSURE: 70 MMHG | WEIGHT: 174 LBS | BODY MASS INDEX: 24.91 KG/M2 | SYSTOLIC BLOOD PRESSURE: 130 MMHG | OXYGEN SATURATION: 92 % | HEART RATE: 55 BPM | TEMPERATURE: 97.8 F

## 2022-07-20 DIAGNOSIS — E11.65 TYPE 2 DIABETES MELLITUS WITH HYPERGLYCEMIA, WITH LONG-TERM CURRENT USE OF INSULIN: Primary | ICD-10-CM

## 2022-07-20 DIAGNOSIS — Z79.4 TYPE 2 DIABETES MELLITUS WITH HYPERGLYCEMIA, WITH LONG-TERM CURRENT USE OF INSULIN: Primary | ICD-10-CM

## 2022-07-20 DIAGNOSIS — E11.69 HYPERLIPIDEMIA ASSOCIATED WITH TYPE 2 DIABETES MELLITUS: ICD-10-CM

## 2022-07-20 DIAGNOSIS — E78.5 HYPERLIPIDEMIA ASSOCIATED WITH TYPE 2 DIABETES MELLITUS: ICD-10-CM

## 2022-07-20 DIAGNOSIS — E03.9 ACQUIRED HYPOTHYROIDISM: ICD-10-CM

## 2022-07-20 PROCEDURE — 99214 OFFICE O/P EST MOD 30 MIN: CPT | Performed by: NURSE PRACTITIONER

## 2022-07-20 NOTE — PROGRESS NOTES
"Chief Complaint  Diabetes (Type 2)    Subjective        Bennie Greene presents to [facility] ENDOCRINOLOGY  History of Present Illness     Type 2 dm    Diagnosed about 10+  years ago.   Today in clinic pt reports being on Tresiba 32 units daily in the am, trulicity 3 mg subq once a week, metformin 500 mg po bid with BF and dinner.   FBG - 130s   Checks BG - once a day sometimes   Dm retinopathy -x ,Last eye exam - oct 2021, yearly   Dm nephropathy - x  Dm neuropathy -x  CAD -x  CVA -x  Episodes of hypoglycemia - x  Pt is physically active     Hypothyroidism   levothyroxine 75 mcg oral daily  Lab Results   Component Value Date    TSH 3.770 07/06/2022          Objective   Vital Signs:  /70   Pulse 55   Temp 97.8 °F (36.6 °C)   Ht 177.8 cm (70\")   Wt 78.9 kg (174 lb)   SpO2 92%   BMI 24.97 kg/m²   Estimated body mass index is 24.97 kg/m² as calculated from the following:    Height as of this encounter: 177.8 cm (70\").    Weight as of this encounter: 78.9 kg (174 lb).    BMI is within normal parameters. No other follow-up for BMI required.      Physical Exam  Vitals reviewed.   Constitutional:       General: He is not in acute distress.  HENT:      Head: Normocephalic and atraumatic.   Cardiovascular:      Rate and Rhythm: Normal rate and regular rhythm.   Pulmonary:      Effort: Pulmonary effort is normal. No respiratory distress.   Musculoskeletal:         General: No signs of injury. Normal range of motion.      Cervical back: Normal range of motion and neck supple.   Skin:     General: Skin is warm and dry.   Neurological:      Mental Status: He is alert and oriented to person, place, and time. Mental status is at baseline.   Psychiatric:         Mood and Affect: Mood normal.         Behavior: Behavior normal.         Thought Content: Thought content normal.         Judgment: Judgment normal.        Result Review :  The following data was reviewed by: ALTAGRACIA Rgoers on " 07/20/2022:  Common labs    Common Labsle 12/28/21 12/28/21 12/28/21 12/28/21 3/29/22 3/29/22 3/29/22 3/29/22 7/6/22 7/6/22 7/6/22    0907 0907 0907 0907 0837 0837 0837 0837 0807 0807 0807   Glucose 69      100 (A)    140 (A)   BUN 16      17    22   Creatinine 1.06      1.14    1.16   eGFR Non  Am 66             eGFR  Am 76             Sodium 143      142    143   Potassium 4.2      4.2    4.4   Chloride 105      103    104   Calcium 9.4      9.3    9.3   Total Protein       7.2       Albumin       4.2       Total Bilirubin       0.5       Alkaline Phosphatase       151 (A)       AST (SGOT)       21       ALT (SGPT)       21       Total Cholesterol   197   188   179     Triglycerides   137   75   118     HDL Cholesterol   38 (A)   36 (A)   35 (A)     LDL Cholesterol    134 (A)   138 (A)   122 (A)     Hemoglobin A1C  8.3 (A)      9.2 (A)  8.5 (A)    Microalbumin, Urine    9.6 17.8         (A) Abnormal value       Comments are available for some flowsheets but are not being displayed.                     Assessment and Plan   Diagnoses and all orders for this visit:    1. Type 2 diabetes mellitus with hyperglycemia, with long-term current use of insulin (HCC) (Primary)  -     Hemoglobin A1c; Future    2. Acquired hypothyroidism  -     Hemoglobin A1c; Future    3. Hyperlipidemia associated with type 2 diabetes mellitus (HCC)  -     Hemoglobin A1c; Future    Other orders  -     Dulaglutide 3 MG/0.5ML solution pen-injector; Inject 0.5 mL under the skin into the appropriate area as directed 1 (One) Time Per Week.  Dispense: 12 pen; Refill: 1             Follow Up   Return in about 3 months (around 10/20/2022).     Resume tresiba at prescribed dose 40units, start slow by increasing dose by 2u each day to fasting bs <120  a1c above target range  continue trulicity  DOES NOT WANT ADDITIONAL MEDICATION AT THIS TIME   tsh improved, continue 75mcg daily   Not interested in statin, educated on low cholesterol diet      Patient was given instructions and counseling regarding his condition or for health maintenance advice. Please see specific information pulled into the AVS if appropriate.     ALTAGRACIA Rogers

## 2022-07-20 NOTE — PATIENT INSTRUCTIONS
Increase tresiba to 34u for 3 days  If BS in the morning is still > 120, increase tresiba to 36u for 3 days  If Bs is still > 120, increase to 38u for 3 days,  If BS is still > 120 start tresiba 40u and continue that dose

## 2022-08-30 ENCOUNTER — OFFICE VISIT (OUTPATIENT)
Dept: INTERNAL MEDICINE | Facility: CLINIC | Age: 82
End: 2022-08-30

## 2022-08-30 VITALS
TEMPERATURE: 97.7 F | DIASTOLIC BLOOD PRESSURE: 70 MMHG | HEART RATE: 67 BPM | OXYGEN SATURATION: 96 % | HEIGHT: 70 IN | RESPIRATION RATE: 16 BRPM | WEIGHT: 173 LBS | BODY MASS INDEX: 24.77 KG/M2 | SYSTOLIC BLOOD PRESSURE: 118 MMHG

## 2022-08-30 DIAGNOSIS — K21.9 GASTROESOPHAGEAL REFLUX DISEASE WITHOUT ESOPHAGITIS: ICD-10-CM

## 2022-08-30 DIAGNOSIS — K31.84 GASTROPARESIS: Primary | ICD-10-CM

## 2022-08-30 PROCEDURE — 99214 OFFICE O/P EST MOD 30 MIN: CPT | Performed by: NURSE PRACTITIONER

## 2022-08-30 NOTE — PROGRESS NOTES
"Chief Complaint  GI Problem (Nausea after eating)    Subjective        Bennie Greene presents to [facility]  History of Present Illness  This is an 80 y/o male presenting to office for complaints of indigestion. Patient reports he has burning and acid reflux with nausea after eating. Patient reports hx of gastroparesis as well which he has had previous surgical procedure-- patient had hemigastrectomy with presumed diagnosis of gastroparesis. Patient reports experiencing bowel movements daily. Patient does take omeprazole daily but has been taking it with all of his other medications after breakfast. Patient reports dyspepsia and nausea after eating breakfast. Patient has various intermittent symptoms. Patient does not currently follow with GI specialty. Patient reports he sometimes will be able to tolerate meals just fine but usually has the most issues at breakfast.       Objective   Vital Signs:  /70 (BP Location: Right arm, Patient Position: Sitting, Cuff Size: Adult)   Pulse 67   Temp 97.7 °F (36.5 °C) (Temporal)   Resp 16   Ht 177.8 cm (70\")   Wt 78.5 kg (173 lb)   SpO2 96%   BMI 24.82 kg/m²   Estimated body mass index is 24.82 kg/m² as calculated from the following:    Height as of this encounter: 177.8 cm (70\").    Weight as of this encounter: 78.5 kg (173 lb).    BMI is within normal parameters. No other follow-up for BMI required.      Physical Exam  Vitals and nursing note reviewed.   Constitutional:       Appearance: Normal appearance.   HENT:      Head: Normocephalic and atraumatic.   Eyes:      Extraocular Movements: Extraocular movements intact.      Pupils: Pupils are equal, round, and reactive to light.   Cardiovascular:      Rate and Rhythm: Normal rate and regular rhythm.      Pulses: Normal pulses.      Heart sounds: Normal heart sounds. No murmur heard.    No friction rub. No gallop.   Pulmonary:      Effort: Pulmonary effort is normal. No respiratory " distress.      Breath sounds: Normal breath sounds. No stridor. No wheezing, rhonchi or rales.   Abdominal:      General: Bowel sounds are normal. There is distension.      Palpations: Abdomen is soft. There is no mass.      Tenderness: There is no abdominal tenderness.      Hernia: No hernia is present.   Musculoskeletal:         General: No swelling or deformity. Normal range of motion.      Cervical back: Normal range of motion and neck supple.   Skin:     General: Skin is warm and dry.      Coloration: Skin is not jaundiced.      Findings: No bruising.   Neurological:      Mental Status: He is alert and oriented to person, place, and time. Mental status is at baseline.   Psychiatric:         Mood and Affect: Mood normal.         Thought Content: Thought content normal.         Judgment: Judgment normal.        Result Review :  The following data was reviewed by: ALTAGRACIA Centeno on 08/30/2022:  Common labs    Common Labsle 12/28/21 12/28/21 12/28/21 12/28/21 3/29/22 3/29/22 3/29/22 3/29/22 7/6/22 7/6/22 7/6/22    0907 0907 0907 0907 0837 0837 0837 0837 0807 0807 0807   Glucose 69      100 (A)    140 (A)   BUN 16      17    22   Creatinine 1.06      1.14    1.16   eGFR Non  Am 66             eGFR  Am 76             Sodium 143      142    143   Potassium 4.2      4.2    4.4   Chloride 105      103    104   Calcium 9.4      9.3    9.3   Total Protein       7.2       Albumin       4.2       Total Bilirubin       0.5       Alkaline Phosphatase       151 (A)       AST (SGOT)       21       ALT (SGPT)       21       Total Cholesterol   197   188   179     Triglycerides   137   75   118     HDL Cholesterol   38 (A)   36 (A)   35 (A)     LDL Cholesterol    134 (A)   138 (A)   122 (A)     Hemoglobin A1C  8.3 (A)      9.2 (A)  8.5 (A)    Microalbumin, Urine    9.6 17.8         (A) Abnormal value       Comments are available for some flowsheets but are not being displayed.                    Assessment and  Plan   Diagnoses and all orders for this visit:    1. Gastroparesis (Primary)  Assessment & Plan:  Referral placed to [facility] motility clinic.     Orders:  -     Ambulatory Referral to Gastroenterology    2. Gastroesophageal reflux disease without esophagitis  Assessment & Plan:  Take omeprazole daily in morning-- take with just water; wait 30 minutes before eating or taking other medications.            I spent 30 minutes caring for Bennie on this date of service. This time includes time spent by me in the following activities:preparing for the visit, reviewing tests, obtaining and/or reviewing a separately obtained history, performing a medically appropriate examination and/or evaluation , counseling and educating the patient/family/caregiver, documenting information in the medical record and care coordination  Follow Up   Return for Next scheduled follow up 10/27/22.  Patient was given instructions and counseling regarding his condition or for health maintenance advice. Please see specific information pulled into the AVS if appropriate.

## 2022-08-30 NOTE — ASSESSMENT & PLAN NOTE
Take omeprazole daily in morning-- take with just water; wait 30 minutes before eating or taking other medications.

## 2022-09-14 ENCOUNTER — TELEPHONE (OUTPATIENT)
Dept: INTERNAL MEDICINE | Facility: CLINIC | Age: 82
End: 2022-09-14

## 2022-09-14 NOTE — TELEPHONE ENCOUNTER
Caller: Diane Greene    Relationship to patient: Emergency Contact    Best call back number: *[phone]    Patient is needing:  PATIENT WIFE DIANE CALLED AND STATED HER  HAS NOT HEARD ANYTHING BACK FROM THE GASTRO DOCTOR. PLEASE CALL.  PATIENT NEEDS TO BE SEEN ASAP! PATIENT STATES SHE HAS BEEN WAITING SINCE 8/30/22 AND NO ONE HAS CALLED.

## 2022-09-14 NOTE — TELEPHONE ENCOUNTER
HUB to read: Referral was faxed on 8/31/22 and patient was advised to call the office at [phone] to scheduled. Please give info to Ms Contreras and let me know if there are any issues.

## 2022-09-15 NOTE — TELEPHONE ENCOUNTER
THIS MESSAGE IS FOR RUSS RODRIGUEZ    PATIENT'S WIFE SALVADOR CALLED BACK AND SHARED INFO WITH SALVADOR.   SHE STATES THAT SHE CALLED DR. YOUSIF OFFICE FOR AN APPOINTMENT. HER OFFICE STATES THEY NEED HIS RECORDS BEFORE THEY CAN GIVE HRE AN APPOINTMENT.    PLEASE SEND RECORDS TO DR. YOUSIF OFFICE.    CALL BACK NUMBER [phone]    HE IS STILL HAVING DIARRHEA, AND WHEN HE EATS IT MAKES HIM SICK.    SALVADOR STATES SHE HAS THE REFERRAL IN HAND AND IT STATES URGENT    SALVADOR STATES IF HE CAN SEE A [facility] DR. MATHEWS HE WOULD BE WILLING TO DO THAT

## 2022-09-21 ENCOUNTER — TELEPHONE (OUTPATIENT)
Dept: INTERNAL MEDICINE | Facility: CLINIC | Age: 82
End: 2022-09-21

## 2022-09-21 DIAGNOSIS — K21.9 GASTROESOPHAGEAL REFLUX DISEASE WITHOUT ESOPHAGITIS: ICD-10-CM

## 2022-09-21 DIAGNOSIS — K31.84 GASTROPARESIS SECONDARY TO HEMIGASTRECTOMY: ICD-10-CM

## 2022-09-21 DIAGNOSIS — K31.84 GASTROPARESIS: Primary | ICD-10-CM

## 2022-09-21 DIAGNOSIS — K91.1 GASTROPARESIS SECONDARY TO HEMIGASTRECTOMY: ICD-10-CM

## 2022-09-21 NOTE — TELEPHONE ENCOUNTER
[facility] is the worst.  I have sent in a new referral to our group.  I have placed it urgent as he is getting sicker.  Please make sure it gets through and he gets contacted for scheduling.

## 2022-09-21 NOTE — TELEPHONE ENCOUNTER
Diane is asking for patient to be sent to another GI provider. His referral has been faxed but she is unable to contact the office to schedule.   Please advise, she stated that patient is pretty sick and unable to eat much.

## 2022-09-28 ENCOUNTER — TELEPHONE (OUTPATIENT)
Dept: GASTROENTEROLOGY | Facility: CLINIC | Age: 82
End: 2022-09-28

## 2022-09-28 ENCOUNTER — OFFICE VISIT (OUTPATIENT)
Dept: GASTROENTEROLOGY | Facility: CLINIC | Age: 82
End: 2022-09-28

## 2022-09-28 VITALS
WEIGHT: 172.6 LBS | DIASTOLIC BLOOD PRESSURE: 80 MMHG | OXYGEN SATURATION: 94 % | TEMPERATURE: 96.4 F | BODY MASS INDEX: 24.71 KG/M2 | SYSTOLIC BLOOD PRESSURE: 122 MMHG | HEART RATE: 73 BPM | HEIGHT: 70 IN

## 2022-09-28 DIAGNOSIS — R10.10 PAIN OF UPPER ABDOMEN: Primary | ICD-10-CM

## 2022-09-28 DIAGNOSIS — R12 HEARTBURN: ICD-10-CM

## 2022-09-28 DIAGNOSIS — R11.0 NAUSEA: ICD-10-CM

## 2022-09-28 DIAGNOSIS — Z86.19 HISTORY OF HELICOBACTER PYLORI INFECTION: ICD-10-CM

## 2022-09-28 DIAGNOSIS — Z90.3 HISTORY OF GASTRECTOMY: ICD-10-CM

## 2022-09-28 PROCEDURE — 99214 OFFICE O/P EST MOD 30 MIN: CPT | Performed by: NURSE PRACTITIONER

## 2022-09-28 RX ORDER — PANTOPRAZOLE SODIUM 40 MG/1
40 TABLET, DELAYED RELEASE ORAL DAILY
Qty: 90 TABLET | Refills: 3 | Status: SHIPPED | OUTPATIENT
Start: 2022-09-28

## 2022-09-28 RX ORDER — METFORMIN HYDROCHLORIDE 500 MG/1
TABLET, EXTENDED RELEASE ORAL
COMMUNITY
Start: 2022-09-16 | End: 2022-12-02

## 2022-09-28 NOTE — PROGRESS NOTES
Chief Complaint   Patient presents with   • Abdominal Pain       HPI    Bennie Greene is a  82 y.o. male here to establish care as a new patient for complaints of abdominal pain and nausea.    Patient has a history of lap josé luis, gastrectomy 2014 secondary to gastric outlet obstruction, questionable gastroparesis, H. pylori gastritis, diabetes and prostate cancer.    He will also follow with Dr. Venegas.    Onset of current symptoms approximately 2 years ago worse lately.  Symptoms include epigastric discomfort described as an aching sensation comes and goes, daily nausea without vomiting.  Appetite is excellent fortunately no weight loss.  He is a longstanding history of GERD has been on omeprazole 20 mg once daily for greater than 10 years.  No dysphagia or odynophagia.    No lower GI complaints.    Colonoscopy in 2014 performed by Dr. Killian with diverticulosis.  Denies personal history colon polyps or family history of colon cancer.      Past Medical History:   Diagnosis Date   • Diabetes mellitus (HCC)    • Dyspepsia    • Erectile dysfunction    • Gastritis 04/09/2014    Marked gastritis by EGD- Dr. BRAXTON Garcia; H. Pylori +   • Gastroparesis     s/p gastrectomy   • GERD (gastroesophageal reflux disease)    • Personal history of malignant neoplasm of prostate     2006 s/p XRT   • Pes cavus    • SCC (squamous cell carcinoma)     scalp/ neck       Past Surgical History:   Procedure Laterality Date   • CHOLECYSTECTOMY  05/2014    Dr. Ton Killian   • COLONOSCOPY  10/2014    Dr. Killian   • PARTIAL GASTRECTOMY  05/2014    Dr. Killian; for gastric outlet obstruction       Scheduled Meds:     Continuous Infusions: No current facility-administered medications for this visit.      PRN Meds:     No Known Allergies    Social History     Socioeconomic History   • Marital status:    Tobacco Use   • Smoking status: Former Smoker     Years: 3.00   • Smokeless tobacco: Never Used   Substance and Sexual Activity   •  Alcohol use: No   • Drug use: No       Family History   Problem Relation Age of Onset   • Heart attack Mother    • Diabetes type II Brother    • Diabetes Brother    • Prostate cancer Brother    • Diabetes type II Son    • Breast cancer Sister        Review of Systems   Constitutional: Negative for activity change, appetite change, fatigue and unexpected weight change.   HENT: Negative for trouble swallowing.    Eyes: Negative.    Respiratory: Negative.    Cardiovascular: Negative.    Gastrointestinal: Positive for abdominal pain and nausea. Negative for abdominal distention, anal bleeding, blood in stool, constipation, diarrhea, rectal pain and vomiting.   Endocrine: Negative.    Genitourinary: Negative.    Musculoskeletal: Negative.    Allergic/Immunologic: Negative.    Neurological: Negative.    Hematological: Negative.    Psychiatric/Behavioral: Negative.        Vitals:    09/28/22 1250   BP: 122/80   Pulse: 73   Temp: 96.4 °F (35.8 °C)   SpO2: 94%       Physical Exam  Constitutional:       Appearance: He is well-developed.   Abdominal:      General: Bowel sounds are normal. There is no distension.      Palpations: Abdomen is soft. There is no mass.      Tenderness: There is abdominal tenderness. There is no guarding.      Hernia: No hernia is present.   Skin:     General: Skin is warm and dry.      Capillary Refill: Capillary refill takes less than 2 seconds.   Neurological:      Mental Status: He is alert and oriented to person, place, and time.   Psychiatric:         Behavior: Behavior normal.     Assessment    Diagnoses and all orders for this visit:    1. Pain of upper abdomen (Primary)  -     Case Request; Standing  -     Case Request  -     CBC & Differential  -     Comprehensive Metabolic Panel  -     Celiac Comprehensive Panel    2. Nausea  -     Case Request; Standing  -     Case Request    3. Heartburn  -     Case Request; Standing  -     Case Request    4. History of Helicobacter pylori infection  -      Case Request; Standing  -     Case Request    5. History of gastrectomy  -     Case Request; Standing  -     Case Request    Other orders  -     pantoprazole (PROTONIX) 40 MG EC tablet; Take 1 tablet by mouth Daily.  Dispense: 90 tablet; Refill: 3       Plan    Arrange EGD with Dr. Venegas for the above-mentioned symptoms and history of H. pylori as well as gastrectomy.  Stop omeprazole.  Start Protonix 40 mg once daily.  Follow an antireflux diet.  Labs today as above.  Further recommendations at follow-up pending the aforementioned work-up.  Consider gastric emptying study pending endoscopic findings.         ALTAGRACIA Engel  [facility]  [address] [place] [address]  Office: [phone]

## 2022-09-29 DIAGNOSIS — E11.65 TYPE 2 DIABETES MELLITUS WITH HYPERGLYCEMIA, WITH LONG-TERM CURRENT USE OF INSULIN: ICD-10-CM

## 2022-09-29 DIAGNOSIS — Z79.4 TYPE 2 DIABETES MELLITUS WITH HYPERGLYCEMIA, WITH LONG-TERM CURRENT USE OF INSULIN: ICD-10-CM

## 2022-09-29 DIAGNOSIS — E11.9 TYPE 2 DIABETES MELLITUS WITHOUT COMPLICATION, WITH LONG-TERM CURRENT USE OF INSULIN: ICD-10-CM

## 2022-09-29 DIAGNOSIS — E03.9 ACQUIRED HYPOTHYROIDISM: ICD-10-CM

## 2022-09-29 DIAGNOSIS — Z79.4 TYPE 2 DIABETES MELLITUS WITHOUT COMPLICATION, WITH LONG-TERM CURRENT USE OF INSULIN: ICD-10-CM

## 2022-09-29 LAB
ALBUMIN SERPL-MCNC: 4.1 G/DL (ref 3.5–5.2)
ALBUMIN/GLOB SERPL: 2 G/DL
ALP SERPL-CCNC: 122 U/L (ref 39–117)
ALT SERPL-CCNC: 13 U/L (ref 1–41)
AST SERPL-CCNC: 19 U/L (ref 1–40)
BASOPHILS # BLD AUTO: 0.04 10*3/MM3 (ref 0–0.2)
BASOPHILS NFR BLD AUTO: 0.7 % (ref 0–1.5)
BILIRUB SERPL-MCNC: 0.4 MG/DL (ref 0–1.2)
BUN SERPL-MCNC: 18 MG/DL (ref 8–23)
BUN/CREAT SERPL: 14.9 (ref 7–25)
CALCIUM SERPL-MCNC: 9.2 MG/DL (ref 8.6–10.5)
CHLORIDE SERPL-SCNC: 105 MMOL/L (ref 98–107)
CO2 SERPL-SCNC: 25 MMOL/L (ref 22–29)
CREAT SERPL-MCNC: 1.21 MG/DL (ref 0.76–1.27)
EGFRCR SERPLBLD CKD-EPI 2021: 60.2 ML/MIN/1.73
ENDOMYSIUM IGA SER QL: NEGATIVE
EOSINOPHIL # BLD AUTO: 0.11 10*3/MM3 (ref 0–0.4)
EOSINOPHIL NFR BLD AUTO: 1.8 % (ref 0.3–6.2)
ERYTHROCYTE [DISTWIDTH] IN BLOOD BY AUTOMATED COUNT: 13.3 % (ref 12.3–15.4)
GLIADIN PEPTIDE IGA SER-ACNC: 5 UNITS (ref 0–19)
GLIADIN PEPTIDE IGG SER-ACNC: 3 UNITS (ref 0–19)
GLOBULIN SER CALC-MCNC: 2.1 GM/DL
GLUCOSE SERPL-MCNC: 108 MG/DL (ref 65–99)
HCT VFR BLD AUTO: 45.7 % (ref 37.5–51)
HGB BLD-MCNC: 15 G/DL (ref 13–17.7)
IGA SERPL-MCNC: 234 MG/DL (ref 61–437)
IMM GRANULOCYTES # BLD AUTO: 0.02 10*3/MM3 (ref 0–0.05)
IMM GRANULOCYTES NFR BLD AUTO: 0.3 % (ref 0–0.5)
LYMPHOCYTES # BLD AUTO: 1.28 10*3/MM3 (ref 0.7–3.1)
LYMPHOCYTES NFR BLD AUTO: 21 % (ref 19.6–45.3)
MCH RBC QN AUTO: 29 PG (ref 26.6–33)
MCHC RBC AUTO-ENTMCNC: 32.8 G/DL (ref 31.5–35.7)
MCV RBC AUTO: 88.4 FL (ref 79–97)
MONOCYTES # BLD AUTO: 0.51 10*3/MM3 (ref 0.1–0.9)
MONOCYTES NFR BLD AUTO: 8.4 % (ref 5–12)
NEUTROPHILS # BLD AUTO: 4.14 10*3/MM3 (ref 1.7–7)
NEUTROPHILS NFR BLD AUTO: 67.8 % (ref 42.7–76)
NRBC BLD AUTO-RTO: 0 /100 WBC (ref 0–0.2)
PLATELET # BLD AUTO: 182 10*3/MM3 (ref 140–450)
POTASSIUM SERPL-SCNC: 4.5 MMOL/L (ref 3.5–5.2)
PROT SERPL-MCNC: 6.2 G/DL (ref 6–8.5)
RBC # BLD AUTO: 5.17 10*6/MM3 (ref 4.14–5.8)
SODIUM SERPL-SCNC: 143 MMOL/L (ref 136–145)
TTG IGA SER-ACNC: <2 U/ML (ref 0–3)
TTG IGG SER-ACNC: 3 U/ML (ref 0–5)
WBC # BLD AUTO: 6.1 10*3/MM3 (ref 3.4–10.8)

## 2022-09-30 RX ORDER — LEVOTHYROXINE SODIUM 0.05 MG/1
TABLET ORAL
Qty: 90 TABLET | Refills: 1 | Status: SHIPPED | OUTPATIENT
Start: 2022-09-30

## 2022-09-30 RX ORDER — DULAGLUTIDE 1.5 MG/.5ML
INJECTION, SOLUTION SUBCUTANEOUS
Qty: 6 ML | Refills: 3 | OUTPATIENT
Start: 2022-09-30

## 2022-09-30 RX ORDER — INSULIN DEGLUDEC 200 U/ML
40 INJECTION, SOLUTION SUBCUTANEOUS DAILY
Qty: 18 ML | Refills: 1 | Status: SHIPPED | OUTPATIENT
Start: 2022-09-30 | End: 2022-12-29

## 2022-10-27 ENCOUNTER — OFFICE VISIT (OUTPATIENT)
Dept: INTERNAL MEDICINE | Facility: CLINIC | Age: 82
End: 2022-10-27

## 2022-10-27 VITALS
WEIGHT: 175.4 LBS | TEMPERATURE: 97.5 F | OXYGEN SATURATION: 92 % | DIASTOLIC BLOOD PRESSURE: 84 MMHG | HEIGHT: 70 IN | BODY MASS INDEX: 25.11 KG/M2 | HEART RATE: 66 BPM | SYSTOLIC BLOOD PRESSURE: 130 MMHG

## 2022-10-27 DIAGNOSIS — E78.49 OTHER HYPERLIPIDEMIA: ICD-10-CM

## 2022-10-27 DIAGNOSIS — Z79.4 TYPE 2 DIABETES MELLITUS WITH HYPERGLYCEMIA, WITH LONG-TERM CURRENT USE OF INSULIN: Primary | ICD-10-CM

## 2022-10-27 DIAGNOSIS — G89.29 CHRONIC RIGHT HIP PAIN: ICD-10-CM

## 2022-10-27 DIAGNOSIS — E11.65 TYPE 2 DIABETES MELLITUS WITH HYPERGLYCEMIA, WITH LONG-TERM CURRENT USE OF INSULIN: Primary | ICD-10-CM

## 2022-10-27 DIAGNOSIS — M25.551 CHRONIC RIGHT HIP PAIN: ICD-10-CM

## 2022-10-27 DIAGNOSIS — E03.9 ACQUIRED HYPOTHYROIDISM: ICD-10-CM

## 2022-10-27 DIAGNOSIS — Z23 NEED FOR INFLUENZA VACCINATION: ICD-10-CM

## 2022-10-27 PROCEDURE — G0008 ADMIN INFLUENZA VIRUS VAC: HCPCS | Performed by: FAMILY MEDICINE

## 2022-10-27 PROCEDURE — 90662 IIV NO PRSV INCREASED AG IM: CPT | Performed by: FAMILY MEDICINE

## 2022-10-27 PROCEDURE — 99214 OFFICE O/P EST MOD 30 MIN: CPT | Performed by: FAMILY MEDICINE

## 2022-10-27 NOTE — PROGRESS NOTES
"Chief Complaint  Diabetes    Subjective        Bennie Greene presents to [facility]  History of Present Illness     He is following with Dr. Parham for his diabetes.  He is on Tresiba insulin as well as metformin.  Stable    Regarding his hyperlipidemia-controlling with diet therapy.  Stable    Regarding his hypothyroidism-he is on levothyroxine therapy.  Tolerating well.  Denies any symptoms that he can discern.  Stable    He had labs completed as at the bottom of the note on September 28, 2022.  These labs showed good control of his sugar.  His A1c in July shows continued improvement at 8.5 A1c which is down from 11 from last time I saw him.  This is still out of goal but a big improvement.  He has new labs scheduled in November.    He has chronic right hip pain.  He says he has his good days and his bad days but today is a bad day.  He does not treat with anything.  No known triggers other than possibly overwork.     Objective   Vital Signs:  /84 (BP Location: Left arm, Patient Position: Sitting, Cuff Size: Adult)   Pulse 66   Temp 97.5 °F (36.4 °C) (Temporal)   Ht 177.8 cm (70\")   Wt 79.6 kg (175 lb 6.4 oz)   SpO2 92%   BMI 25.17 kg/m²   Estimated body mass index is 25.17 kg/m² as calculated from the following:    Height as of this encounter: 177.8 cm (70\").    Weight as of this encounter: 79.6 kg (175 lb 6.4 oz).    BMI is within normal parameters. No other follow-up for BMI required.      Physical Exam  Vitals and nursing note reviewed.   Constitutional:       General: He is not in acute distress.     Appearance: Normal appearance.   Cardiovascular:      Rate and Rhythm: Normal rate and regular rhythm.      Heart sounds: Normal heart sounds. No murmur heard.  Pulmonary:      Effort: Pulmonary effort is normal.      Breath sounds: Normal breath sounds.   Musculoskeletal:      Comments: He is visibly limping, favoring the left side.  He frequently stands up and walks " around the room and then sits.   Neurological:      Mental Status: He is alert.        Result Review :  The following data was reviewed by: Travon Shah MD on 10/27/2022:  Common labs    Common Labs 3/29/22 3/29/22 3/29/22 3/29/22 7/6/22 7/6/22 7/6/22 9/28/22 9/28/22    0837 0837 0837 0837 0807 0807 0807 1325 1325   Glucose   100 (A)    140 (A)  108 (A)   BUN   17    22  18   Creatinine   1.14    1.16  1.21   Sodium   142    143  143   Potassium   4.2    4.4  4.5   Chloride   103    104  105   Calcium   9.3    9.3  9.2   Total Protein   7.2      6.2   Albumin   4.2      4.10   Total Bilirubin   0.5      0.4   Alkaline Phosphatase   151 (A)      122 (A)   AST (SGOT)   21      19   ALT (SGPT)   21      13   WBC        6.10    Hemoglobin        15.0    Hematocrit        45.7    Platelets        182    Total Cholesterol  188   179       Triglycerides  75   118       HDL Cholesterol  36 (A)   35 (A)       LDL Cholesterol   138 (A)   122 (A)       Hemoglobin A1C    9.2 (A)  8.5 (A)      Microalbumin, Urine 17.8           (A) Abnormal value       Comments are available for some flowsheets but are not being displayed.                     Assessment and Plan   Diagnoses and all orders for this visit:    1. Type 2 diabetes mellitus with hyperglycemia, with long-term current use of insulin (HCC) (Primary)    2. Acquired hypothyroidism    3. Other hyperlipidemia    4. Need for influenza vaccination  -     Fluzone High-Dose 65+yrs (6213-4192)    5. Chronic right hip pain  -     diclofenac (VOLTAREN) 50 MG EC tablet; Take 1 tablet by mouth 2 (Two) Times a Day As Needed (moderate pain).  Dispense: 60 tablet; Refill: 5    His diabetes, hypothyroidism, hyperlipidemia appear to be controlled.  He is getting fresh labs in November.  Continue current medications.  He has really good kidney function so Margot give him some diclofenac to take 2 times a day as needed when his hip is bothering him.  He should not take when his hip is  feeling good.  He can also use Tylenol as needed and heat or cold therapy, whichever seems to make him feel better.           Follow Up   Return in about 6 months (around 4/27/2023) for Medicare Wellness.  Patient was given instructions and counseling regarding his condition or for health maintenance advice. Please see specific information pulled into the AVS if appropriate.

## 2022-11-17 DIAGNOSIS — E78.5 HYPERLIPIDEMIA ASSOCIATED WITH TYPE 2 DIABETES MELLITUS: ICD-10-CM

## 2022-11-17 DIAGNOSIS — E03.9 ACQUIRED HYPOTHYROIDISM: ICD-10-CM

## 2022-11-17 DIAGNOSIS — Z79.4 TYPE 2 DIABETES MELLITUS WITH HYPERGLYCEMIA, WITH LONG-TERM CURRENT USE OF INSULIN: ICD-10-CM

## 2022-11-17 DIAGNOSIS — E11.65 TYPE 2 DIABETES MELLITUS WITH HYPERGLYCEMIA, WITH LONG-TERM CURRENT USE OF INSULIN: ICD-10-CM

## 2022-11-17 DIAGNOSIS — E11.69 HYPERLIPIDEMIA ASSOCIATED WITH TYPE 2 DIABETES MELLITUS: ICD-10-CM

## 2022-11-17 LAB — HBA1C MFR BLD: 7.7 % (ref 4.8–5.6)

## 2022-12-02 ENCOUNTER — OFFICE VISIT (OUTPATIENT)
Dept: ENDOCRINOLOGY | Age: 82
End: 2022-12-02

## 2022-12-02 VITALS
TEMPERATURE: 97.7 F | DIASTOLIC BLOOD PRESSURE: 90 MMHG | BODY MASS INDEX: 25.17 KG/M2 | SYSTOLIC BLOOD PRESSURE: 130 MMHG | HEIGHT: 70 IN | WEIGHT: 175.8 LBS | HEART RATE: 60 BPM | OXYGEN SATURATION: 96 %

## 2022-12-02 DIAGNOSIS — E78.5 HYPERLIPIDEMIA ASSOCIATED WITH TYPE 2 DIABETES MELLITUS: ICD-10-CM

## 2022-12-02 DIAGNOSIS — Z79.4 TYPE 2 DIABETES MELLITUS WITH HYPERGLYCEMIA, WITH LONG-TERM CURRENT USE OF INSULIN: Primary | ICD-10-CM

## 2022-12-02 DIAGNOSIS — E11.69 HYPERLIPIDEMIA ASSOCIATED WITH TYPE 2 DIABETES MELLITUS: ICD-10-CM

## 2022-12-02 DIAGNOSIS — E11.65 TYPE 2 DIABETES MELLITUS WITH HYPERGLYCEMIA, WITH LONG-TERM CURRENT USE OF INSULIN: Primary | ICD-10-CM

## 2022-12-02 DIAGNOSIS — E03.9 ACQUIRED HYPOTHYROIDISM: ICD-10-CM

## 2022-12-02 PROCEDURE — 99214 OFFICE O/P EST MOD 30 MIN: CPT | Performed by: INTERNAL MEDICINE

## 2022-12-02 NOTE — PROGRESS NOTES
"Chief Complaint  Chief Complaint   Patient presents with   • Diabetes     Type 2: Pt doesn't have meter, checks bs once a day, is up to date on eye exam, no hx of retinopathy or neuropathy.         Subjective          History of Present Illness    Bennie Greene 82 y.o. presents with Type 2 dm as F/u patient.    Type 2 dm - Diagnosed about 10 + years ago.   Today in clinic pt reports being on tresiba 36 units in the am, trulicity 3 mg subq once a week.   Couldn't tolerate metformin so far  Avg bg - 120 - 160 mg/dl   Checks BG - once a day  Sensor - x   Dm retinopathy -x ,Last eye exam - uptodate with exam.   Dm nephropathy - x  Dm neuropathy -x ,Dm neuropathy meds - n/a  CAD - x  CVA -x  Episodes of hypoglycemia - x  Pt is physically active. weight has been stable.   Pt tries to follow DM diet for most part.     Hypothyroidism - levothyroxine 50 mcg oral daily.     Reviewed primary care physician's/consulting physician documentation and lab results         I have reviewed the patient's allergies, medicines, past medical hx, family hx and social hx in detail.    Objective   Vital Signs:   /90   Pulse 60   Temp 97.7 °F (36.5 °C) (Temporal)   Ht 177.8 cm (70\")   Wt 79.7 kg (175 lb 12.8 oz)   SpO2 96%   BMI 25.22 kg/m²   Physical Exam   General appearance - no distress  Eyes- anicteric sclera  Ear nose and throat-external ears and nose normal.    Respiratory-normal chest on inspection.  No respiratory distress noted.  Skin-no rashes.  Neuro-alert and oriented x3            Result Review :   The following data was reviewed by: Grecia Parham MD on 12/02/2022:  Orders Only on 11/17/2022   Component Date Value Ref Range Status   • Hemoglobin A1C 11/17/2022 7.70 (H)  4.80 - 5.60 % Final    Comment: Hemoglobin A1C Ranges:  Increased Risk for Diabetes  5.7% to 6.4%  Diabetes                     >= 6.5%  Diabetic Goal                < 7.0%       Data reviewed: PCP and endocrine notes       Results Review:    I " "reviewed the patient's new clinical results.     Assessment and Plan    Problem List Items Addressed This Visit        Other    Type 2 diabetes mellitus with hyperglycemia, with long-term current use of insulin (HCC) - Primary (Chronic)    Relevant Orders    Basic Metabolic Panel    Hemoglobin A1c    TSH    Lipid Panel    T4, Free    T3, Free    Acquired hypothyroidism (Chronic)    Relevant Orders    Basic Metabolic Panel    Hemoglobin A1c    TSH    Lipid Panel    T4, Free    T3, Free   Other Visit Diagnoses     Hyperlipidemia associated with type 2 diabetes mellitus (HCC)        Relevant Orders    Basic Metabolic Panel    Hemoglobin A1c    TSH    Lipid Panel    T4, Free    T3, Free        Type 2 diabetes mellitus-uncontrolled with hyperglycemia-chronic problem  Continue Tresiba 36 units in the morning, advised the patient to increase it to 38 units during the holiday season.  Continue Trulicity.  HbA1c has improved from last visit.    Hypothyroidism  Continue levothyroxine 50 mcg oral daily.    Interpreted the blood work-up/imaging results performed by the primary care/consulting physician -    Refills sent to pharmacy    Follow Up     Patient was given instructions and counseling regarding her condition or for health maintenance advice. Please see specific information pulled into the AVS if appropriate.       Thank you for asking me to see your patient, Bennie Greene in consultation.         Grecia Parham MD  12/02/22      EMR Dragon / transcription disclaimer:     \"Dictated utilizing Dragon dictation\".         "

## 2022-12-22 ENCOUNTER — TELEPHONE (OUTPATIENT)
Dept: GASTROENTEROLOGY | Facility: CLINIC | Age: 82
End: 2022-12-22

## 2022-12-22 NOTE — TELEPHONE ENCOUNTER
Caller: Diane Greene    Relationship to patient: Emergency Contact    Best call back number: [phone]    Patient is needing: PT WIFE CALLED AND IS WORRIED ABOUT THE WEATHER TOMORROW AND MAKING IT IN FOR HUSBANDS ENDOSCOPY SCHEDULED FOR 12/23/22 SHE WANTS TO KNOW IF THEY CAN RESCHEDULE AND HOW QUICKLY THE COULD BE RESCHEDULED IF SO. CALL ANYTIME AND OK TO [facility]

## 2022-12-23 NOTE — TELEPHONE ENCOUNTER
Caller: Diane Greene    Relationship to patient: Emergency Contact    Best call back number: [phone]    Patient is needing: CALLED YESTERDAY ABOUT RESCHEDULING ENDOSCOPY, AND HASN'T HEARD BACK YET ABOUT RESCHEDULING. WAS CALLING TODAY TO TRY AGAIN TO RESCHEDULE BUT I WAS UNABLE TO WARM TRANSFER DUE TO OFFICE UNAVAILABILITY.     CAN CALL ANYTIME.

## 2023-01-03 ENCOUNTER — TELEPHONE (OUTPATIENT)
Dept: GASTROENTEROLOGY | Facility: CLINIC | Age: 83
End: 2023-01-03

## 2023-01-03 NOTE — TELEPHONE ENCOUNTER
Caller: SALVADOR     Relationship to patient: MARCUS WIFE     Best call back number:[phone]    Patient is needing: PATIENTS WIFE CALLED IN AND WANTS TO RESCHEDULE HIS ESOPHAGOGASTRODUODENOSCOPY. SHE HAD TO CANCEL THE ONE AT THE END OF December DUE TO THE WEATHER WE HAD. SHE SAID THEY NEED TO GET THIS SCHEDULED AS SOON AS POSSIBLE, HIS SYMPTOMS ARE GETTING WORSE SHE STATED.

## 2023-01-11 ENCOUNTER — TELEPHONE (OUTPATIENT)
Dept: GASTROENTEROLOGY | Facility: CLINIC | Age: 83
End: 2023-01-11
Payer: COMMERCIAL

## 2023-01-11 NOTE — TELEPHONE ENCOUNTER
OK FOR HUB TO READ    patient via telephone for EGD. Scheduled 2/8/23 with arrival time of [facility] WILL CALL WITH TIME. Prep paperwork mailed to verified address on file. Patient advised arrival time may change based on [facility] guidelines. FUNMILAYO APRIL [facility]

## 2023-01-16 ENCOUNTER — TELEPHONE (OUTPATIENT)
Dept: GASTROENTEROLOGY | Facility: CLINIC | Age: 83
End: 2023-01-16
Payer: COMMERCIAL

## 2023-01-16 NOTE — TELEPHONE ENCOUNTER
Called and spoke with patient and informed him that [facility] is not in network with [facility] as of 01/01/2023. Patient was advised that he would need to call the number listed on his insurance card to see which GI is in network for continuation of care.   Patient was advised if he would like to keep his office visit he would be considered self pay and it would be $100 out of pocket due on the day of the appointment (We can not bill)    I also told the patient that he is scheduled for a scope as well and that would be billed as self pay with cost needing to be paid at the time of service. I informed patient that I do not know the self pay cost for scopes and that he would need to call billing to inquire about that cost and that the $100 fee only applies to the office visit not the scope.     Patient asked that his appointment do not be cancelled out until he speaks to his insurance and calls us back.

## 2023-02-08 ENCOUNTER — OUTSIDE FACILITY SERVICE (OUTPATIENT)
Dept: GASTROENTEROLOGY | Facility: CLINIC | Age: 83
End: 2023-02-08
Payer: MEDICARE

## 2023-02-08 ENCOUNTER — LAB REQUISITION (OUTPATIENT)
Dept: LAB | Facility: HOSPITAL | Age: 83
End: 2023-02-08
Payer: COMMERCIAL

## 2023-02-08 DIAGNOSIS — K31.84 GASTROPARESIS: ICD-10-CM

## 2023-02-08 PROCEDURE — 43239 EGD BIOPSY SINGLE/MULTIPLE: CPT | Performed by: INTERNAL MEDICINE

## 2023-02-08 PROCEDURE — 88342 IMHCHEM/IMCYTCHM 1ST ANTB: CPT | Performed by: INTERNAL MEDICINE

## 2023-02-08 PROCEDURE — 88305 TISSUE EXAM BY PATHOLOGIST: CPT | Performed by: INTERNAL MEDICINE

## 2023-02-10 LAB
LAB AP CASE REPORT: NORMAL
PATH REPORT.ADDENDUM SPEC: NORMAL
PATH REPORT.FINAL DX SPEC: NORMAL
PATH REPORT.GROSS SPEC: NORMAL

## 2023-02-11 NOTE — PROGRESS NOTES
Pathology benign   Schedule MRI with and without contrast, MRCP, patient with abdominal pain  Office visit Katherine 4 to 6 weeks

## 2023-02-13 DIAGNOSIS — E11.65 TYPE 2 DIABETES MELLITUS WITH HYPERGLYCEMIA: ICD-10-CM

## 2023-02-14 ENCOUNTER — TELEPHONE (OUTPATIENT)
Dept: GASTROENTEROLOGY | Facility: CLINIC | Age: 83
End: 2023-02-14
Payer: COMMERCIAL

## 2023-02-14 DIAGNOSIS — R10.84 GENERALIZED ABDOMINAL PAIN: Primary | ICD-10-CM

## 2023-02-17 DIAGNOSIS — Z79.4 TYPE 2 DIABETES MELLITUS WITHOUT COMPLICATION, WITH LONG-TERM CURRENT USE OF INSULIN: ICD-10-CM

## 2023-02-17 DIAGNOSIS — E11.9 TYPE 2 DIABETES MELLITUS WITHOUT COMPLICATION, WITH LONG-TERM CURRENT USE OF INSULIN: ICD-10-CM

## 2023-02-17 RX ORDER — INSULIN DEGLUDEC 200 U/ML
40 INJECTION, SOLUTION SUBCUTANEOUS DAILY
Qty: 6 ML | Refills: 1 | Status: SHIPPED | OUTPATIENT
Start: 2023-02-17 | End: 2023-03-19

## 2023-02-17 RX ORDER — PEN NEEDLE, DIABETIC 32GX 5/32"
NEEDLE, DISPOSABLE MISCELLANEOUS
Qty: 100 EACH | Refills: 3 | Status: SHIPPED | OUTPATIENT
Start: 2023-02-17

## 2023-02-17 NOTE — TELEPHONE ENCOUNTER
Pt called in to have pen needles sent to the [facility] in [facility]. Pt has 3 left.

## 2023-02-18 RX ORDER — INSULIN DEGLUDEC 200 U/ML
INJECTION, SOLUTION SUBCUTANEOUS
Refills: 1 | OUTPATIENT
Start: 2023-02-18

## 2023-03-05 ENCOUNTER — HOSPITAL ENCOUNTER (OUTPATIENT)
Dept: MRI IMAGING | Facility: HOSPITAL | Age: 83
Discharge: HOME | End: 2023-03-05
Admitting: INTERNAL MEDICINE
Payer: MEDICARE

## 2023-03-05 DIAGNOSIS — R10.84 GENERALIZED ABDOMINAL PAIN: ICD-10-CM

## 2023-03-05 PROCEDURE — A9577 INJ MULTIHANCE: HCPCS | Performed by: INTERNAL MEDICINE

## 2023-03-05 PROCEDURE — 74183 MRI ABD W/O CNTR FLWD CNTR: CPT

## 2023-03-05 PROCEDURE — 82565 ASSAY OF CREATININE: CPT

## 2023-03-05 PROCEDURE — 0 GADOBENATE DIMEGLUMINE 529 MG/ML SOLUTION: Performed by: INTERNAL MEDICINE

## 2023-03-05 RX ADMIN — GADOBENATE DIMEGLUMINE 20 ML: 529 INJECTION, SOLUTION INTRAVENOUS at 14:41

## 2023-03-06 LAB — CREAT BLDA-MCNC: 1.1 MG/DL (ref 0.6–1.3)

## 2023-03-07 NOTE — PROGRESS NOTES
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

## 2023-03-14 ENCOUNTER — TELEPHONE (OUTPATIENT)
Dept: GASTROENTEROLOGY | Facility: CLINIC | Age: 83
End: 2023-03-14

## 2023-03-14 NOTE — TELEPHONE ENCOUNTER
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

## 2023-03-14 NOTE — TELEPHONE ENCOUNTER
Called pt and spoke with wife (ok per AVIVA) and  advised of Dr. Venegas's note.  She  verbalized understanding.     Wife states pt is still having a lot abdominal pain and several episodes of diarrhea per day. Denies fever, has had one episode of vomiting. Pt has not taken any medication for symptoms. Advised will send Dr Venegas a msg.

## 2023-03-14 NOTE — TELEPHONE ENCOUNTER
Hub staff attempted to follow warm transfer process and was unsuccessful     Caller: Diane Greene    Relationship to patient: Emergency Contact    Best call back number: [phone]    Patient is needing: PATIENT'S WIFE CALLED AND STATED THAT SHE SAW RESULTS IN [facility] FOR ABDOMEN MRI, BUT DIDN'T QUITE UNDERSTAND WHAT SHE WAS READING, SHE WOULD LIKE TO DISCUSS THOSE IN GREATER DETAIL. SHE ALSO STATED THAT MELISSA'S STOMACH HAS BEEN BOTHERING HIM AND HE HAS HAD DIARRHEA THE PAST TWO DAYS. SHE WOULD LIKE A CALL BACK ASAP AND OK TO [facility]

## 2023-03-30 ENCOUNTER — OFFICE VISIT (OUTPATIENT)
Dept: GASTROENTEROLOGY | Facility: CLINIC | Age: 83
End: 2023-03-30
Payer: MEDICARE

## 2023-03-30 VITALS — BODY MASS INDEX: 25.34 KG/M2 | WEIGHT: 177 LBS | HEIGHT: 70 IN | TEMPERATURE: 97.1 F

## 2023-03-30 DIAGNOSIS — R15.2 INCONTINENCE OF FECES WITH FECAL URGENCY: ICD-10-CM

## 2023-03-30 DIAGNOSIS — R15.9 INCONTINENCE OF FECES WITH FECAL URGENCY: ICD-10-CM

## 2023-03-30 DIAGNOSIS — R19.7 DIARRHEA, UNSPECIFIED TYPE: Primary | ICD-10-CM

## 2023-03-30 PROCEDURE — 1160F RVW MEDS BY RX/DR IN RCRD: CPT | Performed by: NURSE PRACTITIONER

## 2023-03-30 PROCEDURE — 1159F MED LIST DOCD IN RCRD: CPT | Performed by: NURSE PRACTITIONER

## 2023-03-30 PROCEDURE — 99214 OFFICE O/P EST MOD 30 MIN: CPT | Performed by: NURSE PRACTITIONER

## 2023-03-30 RX ORDER — L.RHAMNOSUS/B.ANIMALIS(LACTIS) 3B CELL
1 CAPSULE ORAL DAILY
Qty: 30 CAPSULE | Refills: 5 | Status: SHIPPED | OUTPATIENT
Start: 2023-03-30

## 2023-03-30 NOTE — PROGRESS NOTES
Chief Complaint   Patient presents with   • Abdominal Pain   • Diarrhea       HPI    Bennie Greene is a  82 y.o. male here for a follow up visit for abdominal pain and diarrhea.    This patient follows with Dr. Venegas and myself.    Patient has a history of lap josé luis, gastrectomy 2014 secondary to gastric outlet obstruction, questionable gastroparesis, H. pylori gastritis, diabetes and prostate cancer.    He underwent EGD 2/8/2023 -normal esophagus.  Patent Billroth II gastrojejunostomy found characterized by healthy-appearing mucosa.  Pathology was benign.    Subsequent MRCP -benign biliary ectasia without stones or malignancy.  Mild dilation of the pancreatic duct normal given his age    Today he is accompanied by his wife who gives collateral medical information.  Patient reports diarrhea for at least the past 2 years not mentioned on last office visit worse lately.  Diarrhea occurs 5 days out of the week with fecal urgency and more recently episodes of fecal incontinence.  Denies rectal bleeding.  Frequent gas, bloating and abdominal cramps.  His appetite is good.  His weight is stable.    Upper GI symptoms which included of upper abdominal pain nausea and heartburn have resolved since last office visit likely due to change in PPI therapy which she continues on.  No dysphagia or odynophagia.    Colonoscopy in 2014 performed by Dr. Killian with diverticulosis.  Denies personal history colon polyps or family history of colon cancer.    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    Past Medical History:   Diagnosis Date   • Diabetes mellitus (HCC)    • Dyspepsia    • Erectile dysfunction    • Gastritis 04/09/2014    Marked gastritis by EGD- Dr. BRAXTON Garcia; H. Pylori +   • Gastroparesis     s/p gastrectomy   • GERD (gastroesophageal reflux disease)    • Personal history of malignant neoplasm of prostate     2006 s/p XRT   • Pes cavus    • SCC (squamous  cell carcinoma)     scalp/ neck   • Type 2 diabetes mellitus (HCC) 2015       Past Surgical History:   Procedure Laterality Date   • CHOLECYSTECTOMY  05/2014    Dr. Ton Killian   • COLONOSCOPY  10/2014    Dr. Killian   • PARTIAL GASTRECTOMY  05/2014    Dr. Killian; for gastric outlet obstruction   • UPPER GASTROINTESTINAL ENDOSCOPY  2015       Scheduled Meds:     Continuous Infusions: No current facility-administered medications for this visit.      PRN Meds:     No Known Allergies    Social History     Socioeconomic History   • Marital status:    Tobacco Use   • Smoking status: Former     Years: 3.00     Types: Cigarettes   • Smokeless tobacco: Never   Substance and Sexual Activity   • Alcohol use: No   • Drug use: No       Family History   Problem Relation Age of Onset   • Heart attack Mother    • Diabetes type II Brother    • Diabetes Brother    • Prostate cancer Brother    • Diabetes type II Son    • Breast cancer Sister        Review of Systems   Constitutional: Negative for activity change, appetite change, fatigue, fever and unexpected weight change.   HENT: Negative for trouble swallowing.    Respiratory: Negative for apnea, cough, choking, chest tightness, shortness of breath and wheezing.    Cardiovascular: Negative for chest pain, palpitations and leg swelling.   Gastrointestinal: Positive for diarrhea. Negative for abdominal distention, abdominal pain, anal bleeding, blood in stool, constipation, nausea, rectal pain and vomiting.       Vitals:    03/30/23 1138   Temp: 97.1 °F (36.2 °C)       Physical Exam  Constitutional:       Appearance: He is well-developed.   Abdominal:      General: Bowel sounds are normal. There is no distension.      Palpations: Abdomen is soft. There is no mass.      Tenderness: There is no abdominal tenderness. There is no guarding.      Hernia: No hernia is present.   Skin:     General: Skin is warm and dry.      Capillary Refill: Capillary refill takes less than 2  seconds.   Neurological:      Mental Status: He is alert and oriented to person, place, and time.   Psychiatric:         Behavior: Behavior normal.     Assessment    Diagnoses and all orders for this visit:    1. Diarrhea, unspecified type (Primary)  -     Clostridioides difficile Toxin, PCR - Stool, Per Rectum  -     Calprotectin, Fecal - Stool, Per Rectum  -     Stool Culture (Reference Lab) - Stool, Per Rectum  -     CBC & Differential  -     Comprehensive Metabolic Panel  -     TSH  -     C-reactive Protein  -     Sedimentation Rate    2. Incontinence of feces with fecal urgency    Other orders  -     Probiotic Product ([facility]) capsule; Take 1 tablet by mouth Daily.  Dispense: 30 capsule; Refill: 5       Plan    Pleasant 82-year-old male seen today in follow-up complaining of several years of diarrhea not reported on last office visit worse as of late.  Recommend stool testing to rule infectious pathogens and assess for colonic inflammation.  Labs today as above.  Start Rosales' colon health probiotics in the interim with potential colonoscopy to follow pending work-up.         ALTAGRACIA Engel  [facility]  [address] [place] [address]  Office: [phone]

## 2023-03-31 DIAGNOSIS — R74.8 ELEVATED ALKALINE PHOSPHATASE LEVEL: Primary | ICD-10-CM

## 2023-03-31 LAB
ALBUMIN SERPL-MCNC: 4 G/DL (ref 3.5–5.2)
ALBUMIN/GLOB SERPL: 1.5 G/DL
ALP SERPL-CCNC: 140 U/L (ref 39–117)
ALT SERPL-CCNC: 32 U/L (ref 1–41)
AST SERPL-CCNC: 27 U/L (ref 1–40)
BASOPHILS # BLD AUTO: 0.05 10*3/MM3 (ref 0–0.2)
BASOPHILS NFR BLD AUTO: 0.8 % (ref 0–1.5)
BILIRUB SERPL-MCNC: 0.3 MG/DL (ref 0–1.2)
BUN SERPL-MCNC: 17 MG/DL (ref 8–23)
BUN/CREAT SERPL: 15.7 (ref 7–25)
CALCIUM SERPL-MCNC: 9.5 MG/DL (ref 8.6–10.5)
CHLORIDE SERPL-SCNC: 100 MMOL/L (ref 98–107)
CO2 SERPL-SCNC: 27.9 MMOL/L (ref 22–29)
CREAT SERPL-MCNC: 1.08 MG/DL (ref 0.76–1.27)
CRP SERPL-MCNC: <0.3 MG/DL (ref 0–0.5)
EGFRCR SERPLBLD CKD-EPI 2021: 68.9 ML/MIN/1.73
EOSINOPHIL # BLD AUTO: 0.23 10*3/MM3 (ref 0–0.4)
EOSINOPHIL NFR BLD AUTO: 3.8 % (ref 0.3–6.2)
ERYTHROCYTE [DISTWIDTH] IN BLOOD BY AUTOMATED COUNT: 12.9 % (ref 12.3–15.4)
ERYTHROCYTE [SEDIMENTATION RATE] IN BLOOD BY WESTERGREN METHOD: 13 MM/HR (ref 0–20)
GLOBULIN SER CALC-MCNC: 2.7 GM/DL
GLUCOSE SERPL-MCNC: 168 MG/DL (ref 65–99)
HCT VFR BLD AUTO: 44.6 % (ref 37.5–51)
HGB BLD-MCNC: 14.8 G/DL (ref 13–17.7)
IMM GRANULOCYTES # BLD AUTO: 0.01 10*3/MM3 (ref 0–0.05)
IMM GRANULOCYTES NFR BLD AUTO: 0.2 % (ref 0–0.5)
LYMPHOCYTES # BLD AUTO: 1.44 10*3/MM3 (ref 0.7–3.1)
LYMPHOCYTES NFR BLD AUTO: 24 % (ref 19.6–45.3)
MCH RBC QN AUTO: 29.1 PG (ref 26.6–33)
MCHC RBC AUTO-ENTMCNC: 33.2 G/DL (ref 31.5–35.7)
MCV RBC AUTO: 87.8 FL (ref 79–97)
MONOCYTES # BLD AUTO: 0.63 10*3/MM3 (ref 0.1–0.9)
MONOCYTES NFR BLD AUTO: 10.5 % (ref 5–12)
NEUTROPHILS # BLD AUTO: 3.64 10*3/MM3 (ref 1.7–7)
NEUTROPHILS NFR BLD AUTO: 60.7 % (ref 42.7–76)
NRBC BLD AUTO-RTO: 0 /100 WBC (ref 0–0.2)
PLATELET # BLD AUTO: 212 10*3/MM3 (ref 140–450)
POTASSIUM SERPL-SCNC: 4.7 MMOL/L (ref 3.5–5.2)
PROT SERPL-MCNC: 6.7 G/DL (ref 6–8.5)
RBC # BLD AUTO: 5.08 10*6/MM3 (ref 4.14–5.8)
SODIUM SERPL-SCNC: 139 MMOL/L (ref 136–145)
TSH SERPL DL<=0.005 MIU/L-ACNC: 3.76 UIU/ML (ref 0.27–4.2)
WBC # BLD AUTO: 6 10*3/MM3 (ref 3.4–10.8)

## 2023-03-31 NOTE — PROGRESS NOTES
Please inform the patient lab work shows a elevated glucose at 168 please discuss further with endocrinology.  One of his liver function test the alkaline phosphatase is mildly elevated.  Recommend additional lab work to determine if this is coming from bones or liver.  Labs have been ordered.  No anemia with normal thyroid function.  Please complete stool testing.

## 2023-04-01 ENCOUNTER — TELEPHONE (OUTPATIENT)
Dept: GASTROENTEROLOGY | Facility: CLINIC | Age: 83
End: 2023-04-01
Payer: COMMERCIAL

## 2023-04-01 NOTE — TELEPHONE ENCOUNTER
Called pt and advised of Katherine HARDIN's note. Verb understanding.     Lab appt made for 04/04 at 8a.

## 2023-04-04 ENCOUNTER — LAB (OUTPATIENT)
Dept: GASTROENTEROLOGY | Facility: CLINIC | Age: 83
End: 2023-04-04
Payer: MEDICARE

## 2023-04-04 LAB — GGT SERPL-CCNC: 11 U/L (ref 8–61)

## 2023-04-05 LAB — MITOCHONDRIA M2 IGG SER-ACNC: <20 UNITS (ref 0–20)

## 2023-04-06 LAB
ALP BONE CFR SERPL: 29 % (ref 12–68)
ALP INTEST CFR SERPL: 26 % (ref 0–18)
ALP LIVER CFR SERPL: 46 % (ref 13–88)
ALP SERPL-CCNC: 151 IU/L (ref 44–121)

## 2023-04-12 ENCOUNTER — TELEPHONE (OUTPATIENT)
Dept: GASTROENTEROLOGY | Facility: CLINIC | Age: 83
End: 2023-04-12
Payer: COMMERCIAL

## 2023-04-12 NOTE — TELEPHONE ENCOUNTER
----- Message from ALTAGRACIA Engel sent at 4/10/2023 12:21 PM EDT -----  Please inform the patient lab work shows elevation in alkaline phosphatase seems to be coming from the intestine.  Please complete stool testing as previously ordered.

## 2023-04-18 NOTE — TELEPHONE ENCOUNTER
Called pt and spoke with pt's wife. Verb understanding.     She reports that since her  started the probiotics he is doing much better and feels like that he does not need the stool studies. Advised will update Katherine HARDIN.

## 2023-05-03 ENCOUNTER — OFFICE VISIT (OUTPATIENT)
Dept: INTERNAL MEDICINE | Facility: CLINIC | Age: 83
End: 2023-05-03
Payer: MEDICARE

## 2023-05-03 VITALS
RESPIRATION RATE: 18 BRPM | DIASTOLIC BLOOD PRESSURE: 80 MMHG | WEIGHT: 172 LBS | HEART RATE: 70 BPM | HEIGHT: 70 IN | BODY MASS INDEX: 24.62 KG/M2 | OXYGEN SATURATION: 98 % | SYSTOLIC BLOOD PRESSURE: 130 MMHG

## 2023-05-03 DIAGNOSIS — Z85.46 HISTORY OF PROSTATE CANCER: ICD-10-CM

## 2023-05-03 DIAGNOSIS — E11.65 TYPE 2 DIABETES MELLITUS WITH HYPERGLYCEMIA, WITH LONG-TERM CURRENT USE OF INSULIN: ICD-10-CM

## 2023-05-03 DIAGNOSIS — Z79.4 TYPE 2 DIABETES MELLITUS WITH HYPERGLYCEMIA, WITH LONG-TERM CURRENT USE OF INSULIN: ICD-10-CM

## 2023-05-03 DIAGNOSIS — Z00.00 MEDICARE ANNUAL WELLNESS VISIT, SUBSEQUENT: Primary | ICD-10-CM

## 2023-05-03 PROCEDURE — 1160F RVW MEDS BY RX/DR IN RCRD: CPT | Performed by: FAMILY MEDICINE

## 2023-05-03 PROCEDURE — 1170F FXNL STATUS ASSESSED: CPT | Performed by: FAMILY MEDICINE

## 2023-05-03 PROCEDURE — G0439 PPPS, SUBSEQ VISIT: HCPCS | Performed by: FAMILY MEDICINE

## 2023-05-03 PROCEDURE — 1159F MED LIST DOCD IN RCRD: CPT | Performed by: FAMILY MEDICINE

## 2023-05-03 NOTE — PROGRESS NOTES
The ABCs of the Annual Wellness Visit  Subsequent Medicare Wellness Visit    Subjective      Bennie Greene is a 82 y.o. male who presents for a Subsequent Medicare Wellness Visit.    The following portions of the patient's history were reviewed and   updated as appropriate: allergies, current medications, past family history, past medical history, past social history, past surgical history and problem list.    Compared to one year ago, the patient feels his physical   health is the same.    Compared to one year ago, the patient feels his mental   health is the same.    Recent Hospitalizations:  He was not admitted to the hospital during the last year.       Current Medical Providers:  Patient Care Team:  Travon Shah MD as PCP - General (Family Medicine)  Ramila Stanley MD as Consulting Physician (Ophthalmology)  Shila Killian MD as Surgeon (General Surgery)  Mata Wright MD as Consulting Physician (Dermatology)    Outpatient Medications Prior to Visit   Medication Sig Dispense Refill   • BD Pen Needle Nola 2nd Gen 32G X 4 MM misc USE WITH INSULIN EVERY  each 3   • Blood Glucose Monitoring Suppl (BLOOD GLUCOSE MONITOR SYSTEM) w/Device kit 1 each 2 (Two) Times a Day. 1 each 0   • Dulaglutide 3 MG/0.5ML solution pen-injector Inject 0.5 mL under the skin into the appropriate area as directed 1 (One) Time Per Week for 90 days. 6 mL 0   • glucose blood test strip 1 each by Other route 2 (Two) Times a Day. Use as instructed 200 each 11   • Lancets misc USE TO TEST BLOOD SUGAR ONE TIME DAILY. 100 each 4   • levothyroxine (SYNTHROID, LEVOTHROID) 50 MCG tablet TAKE 1 TABLET BY MOUTH EVERY DAY 90 tablet 1   • mupirocin (BACTROBAN) 2 % ointment Apply 1 application topically to the appropriate area as directed 3 (Three) Times a Day.     • pantoprazole (PROTONIX) 40 MG EC tablet Take 1 tablet by mouth Daily. 90 tablet 3   • Probiotic Product (Mochi Media) capsule Take 1 tablet by mouth Daily.  "30 capsule 5     No facility-administered medications prior to visit.       No opioid medication identified on active medication list. I have reviewed chart for other potential  high risk medication/s and harmful drug interactions in the elderly.          Aspirin is not on active medication list.  Aspirin use is not indicated based on review of current medical condition/s. Risk of harm outweighs potential benefits.  .    Patient Active Problem List   Diagnosis   • Type 2 diabetes mellitus with hyperglycemia, with long-term current use of insulin (HCC)   • Personal history of malignant neoplasm of prostate   • Other hyperlipidemia   • Acquired hypothyroidism   • Syncope and collapse   • C4 cervical fracture   • Thumb fracture   • Gastroparesis   • Gastroesophageal reflux disease without esophagitis   • Pain of upper abdomen   • Nausea   • Heartburn   • History of Helicobacter pylori infection   • History of gastrectomy   • Chronic right hip pain     Advance Care Planning   Advance Care Planning     Advance Directive is not on file.  ACP discussion was held with the patient during this visit. Patient does not have an advance directive, information provided.     Objective    Vitals:    05/03/23 0749   BP: 130/80   BP Location: Left arm   Patient Position: Sitting   Cuff Size: Small Adult   Pulse: 70   Resp: 18   SpO2: 98%   Weight: 78 kg (172 lb)   Height: 177.8 cm (70\")   PainSc: 0-No pain     Estimated body mass index is 24.68 kg/m² as calculated from the following:    Height as of this encounter: 177.8 cm (70\").    Weight as of this encounter: 78 kg (172 lb).    BMI is >= 25 and <30. (Overweight) The following options were offered after discussion;: exercise counseling/recommendations and nutrition counseling/recommendations    Physical Exam  Vitals and nursing note reviewed.   Constitutional:       General: He is not in acute distress.     Appearance: Normal appearance.   Cardiovascular:      Rate and Rhythm: " Normal rate and regular rhythm.      Heart sounds: Normal heart sounds. No murmur heard.  Pulmonary:      Effort: Pulmonary effort is normal.      Breath sounds: Normal breath sounds.   Neurological:      Mental Status: He is alert.           Does the patient have evidence of cognitive impairment?   No            HEALTH RISK ASSESSMENT    Smoking Status:  Social History     Tobacco Use   Smoking Status Former   • Years: 3.00   • Types: Cigarettes   Smokeless Tobacco Never     Alcohol Consumption:  Social History     Substance and Sexual Activity   Alcohol Use No     Fall Risk Screen:    [facility], and patient is at LOW risk for falls.Assessment completed on:5/3/2023    Depression Screenin/3/2023     7:51 AM   PHQ-2/PHQ-9 Depression Screening   Little Interest or Pleasure in Doing Things 0-->not at all   Feeling Down, Depressed or Hopeless 0-->not at all   PHQ-9: Brief Depression Severity Measure Score 0       Health Habits and Functional and Cognitive Screenin/3/2023     7:50 AM   Functional & Cognitive Status   Do you have difficulty preparing food and eating? No   Do you have difficulty bathing yourself, getting dressed or grooming yourself? No   Do you have difficulty using the toilet? No   Do you have difficulty moving around from place to place? No   Do you have trouble with steps or getting out of a bed or a chair? No   Current Diet Well Balanced Diet   Dental Exam Up to date   Eye Exam Up to date   Exercise (times per week) 7 times per week   Current Exercises Include Walking   Do you need help using the phone?  No   Are you deaf or do you have serious difficulty hearing?  Yes   Do you need help with transportation? No   Do you need help shopping? No   Do you need help preparing meals?  No   Do you need help with housework?  No   Do you need help with laundry? No   Do you need help taking your medications? No   Do you need help managing money? No   Do you ever  drive or ride in a car without wearing a seat belt? No   Have you felt unusual stress, anger or loneliness in the last month? No   Who do you live with? Spouse   If you need help, do you have trouble finding someone available to you? No   Have you been bothered in the last four weeks by sexual problems? No   Do you have difficulty concentrating, remembering or making decisions? Yes       Age-appropriate Screening Schedule:  Refer to the list below for future screening recommendations based on patient's age, sex and/or medical conditions. Orders for these recommended tests are listed in the plan section. The patient has been provided with a written plan.    Health Maintenance   Topic Date Due   • ZOSTER VACCINE (1 of 2) Never done   • COVID-19 Vaccine (5 - Booster for Moderna series) 02/09/2022   • DIABETIC EYE EXAM  08/27/2022   • URINE MICROALBUMIN  03/29/2023   • ANNUAL WELLNESS VISIT  04/07/2023   • HEMOGLOBIN A1C  05/17/2023   • LIPID PANEL  07/06/2023   • INFLUENZA VACCINE  08/01/2023   • COLORECTAL CANCER SCREENING  10/27/2024   • TDAP/TD VACCINES (2 - Tdap) 06/27/2028   • Pneumococcal Vaccine 65+  Completed                  CMS Preventative Services Quick Reference  Risk Factors Identified During Encounter:    Immunizations Discussed/Encouraged: Shingrix and COVID19    The above risks/problems have been discussed with the patient.  Pertinent information has been shared with the patient in the After Visit Summary.    Diagnoses and all orders for this visit:    1. Medicare annual wellness visit, subsequent (Primary)    2. Type 2 diabetes mellitus with hyperglycemia, with long-term current use of insulin  -     Microalbumin / Creatinine Urine Ratio - Urine, Clean Catch; Future    3. History of prostate cancer  -     PSA DIAGNOSTIC; Future        Follow Up:   Next Medicare Wellness visit to be scheduled in 1 year.      An After Visit Summary and PPPS were made available to the patient.

## 2023-05-12 ENCOUNTER — OFFICE VISIT (OUTPATIENT)
Dept: ENDOCRINOLOGY | Age: 83
End: 2023-05-12
Payer: MEDICARE

## 2023-05-12 VITALS
SYSTOLIC BLOOD PRESSURE: 120 MMHG | HEART RATE: 95 BPM | BODY MASS INDEX: 24.74 KG/M2 | TEMPERATURE: 97.1 F | HEIGHT: 70 IN | OXYGEN SATURATION: 90 % | WEIGHT: 172.8 LBS | DIASTOLIC BLOOD PRESSURE: 70 MMHG

## 2023-05-12 DIAGNOSIS — E11.65 TYPE 2 DIABETES MELLITUS WITH HYPERGLYCEMIA, WITH LONG-TERM CURRENT USE OF INSULIN: Primary | ICD-10-CM

## 2023-05-12 DIAGNOSIS — Z79.4 TYPE 2 DIABETES MELLITUS WITH HYPERGLYCEMIA, WITH LONG-TERM CURRENT USE OF INSULIN: Primary | ICD-10-CM

## 2023-05-12 PROCEDURE — 99213 OFFICE O/P EST LOW 20 MIN: CPT | Performed by: NURSE PRACTITIONER

## 2023-05-12 RX ORDER — INSULIN DEGLUDEC 200 U/ML
38 INJECTION, SOLUTION SUBCUTANEOUS DAILY
Qty: 17.1 ML | Refills: 1 | Status: SHIPPED | OUTPATIENT
Start: 2023-05-12 | End: 2023-08-10

## 2023-05-12 RX ORDER — INSULIN DEGLUDEC 200 U/ML
INJECTION, SOLUTION SUBCUTANEOUS
COMMUNITY
Start: 2023-04-03 | End: 2023-05-12 | Stop reason: SDUPTHER

## 2023-05-12 RX ORDER — DULAGLUTIDE 1.5 MG/.5ML
1.5 INJECTION, SOLUTION SUBCUTANEOUS WEEKLY
Qty: 2 ML | Refills: 0 | Status: SHIPPED | OUTPATIENT
Start: 2023-05-12

## 2023-05-12 NOTE — PROGRESS NOTES
"Chief Complaint  Diabetes (Type 2: Pt doesn't have meter, checks bs once a week, is up to date on eye exam, no hx of retinopathy or neuropathy. )    Subjective        Bennie Greene presents to [facility]  History of Present Illness    Type 2 dm   Diagnosed about 10 + years ago.   Today in clinic pt reports being on tresiba 36 units in the am, trulicity 3 mg subq once a week   Checks BG - once a weeks \" 100s\"  Last eye exam - going in august 2023  Denies diabetic complications  Does have numbness in his hands at times   Episodes of hypoglycemia - none reported based on symptoms      Objective   Vital Signs:  /70   Pulse 95   Temp 97.1 °F (36.2 °C) (Temporal)   Ht 177.8 cm (70\")   Wt 78.4 kg (172 lb 12.8 oz)   SpO2 90%   BMI 24.79 kg/m²   Estimated body mass index is 24.79 kg/m² as calculated from the following:    Height as of this encounter: 177.8 cm (70\").    Weight as of this encounter: 78.4 kg (172 lb 12.8 oz).       BMI is within normal parameters. No other follow-up for BMI required.      Physical Exam  Vitals reviewed.   Constitutional:       General: He is not in acute distress.  HENT:      Head: Normocephalic and atraumatic.   Cardiovascular:      Rate and Rhythm: Normal rate.   Pulmonary:      Effort: Pulmonary effort is normal. No respiratory distress.   Musculoskeletal:         General: No signs of injury. Normal range of motion.      Cervical back: Normal range of motion and neck supple.   Skin:     General: Skin is warm and dry.   Neurological:      Mental Status: He is alert and oriented to person, place, and time. Mental status is at baseline.   Psychiatric:         Mood and Affect: Mood normal.         Behavior: Behavior normal.         Thought Content: Thought content normal.         Judgment: Judgment normal.          Result Review :  The following data was reviewed by: ALTAGRACIA Rogers on 05/12/2023:  Common labs        3/30/2023    12:43 4/4/2023 "    07:56 5/3/2023    09:19   Common Labs   Glucose 168    100     BUN 17    20     Creatinine 1.08    1.10     Sodium 139    143     Potassium 4.7    4.2     Chloride 100    103     Calcium 9.5    9.8     Total Protein 6.7       Albumin 4.0       Total Bilirubin 0.3       Alkaline Phosphatase 140   151      AST (SGOT) 27       ALT (SGPT) 32       WBC 6.00       Hemoglobin 14.8       Hematocrit 44.6       Platelets 212       Total Cholesterol   143     Triglycerides   93     HDL Cholesterol   39     LDL Cholesterol    86     Hemoglobin A1C   7.90                    Assessment and Plan   Diagnoses and all orders for this visit:    1. Type 2 diabetes mellitus with hyperglycemia, with long-term current use of insulin (Primary)    Other orders  -     Tresiba FlexTouch 200 UNIT/ML solution pen-injector pen injection; Inject 38 Units under the skin into the appropriate area as directed Daily for 90 days.  Dispense: 17.1 mL; Refill: 1  -     Discontinue: Dulaglutide 3 MG/0.5ML solution pen-injector; Inject 0.5 mL under the skin into the appropriate area as directed 1 (One) Time Per Week for 90 days.  Dispense: 6 mL; Refill: 1  -     Dulaglutide (Trulicity) 1.5 MG/0.5ML solution pen-injector; Inject 1.5 mg under the skin into the appropriate area as directed 1 (One) Time Per Week.  Dispense: 2 mL; Refill: 0             Follow Up   Return in about 6 months (around 11/12/2023).     a1c is up just a bit   Try trulicity 1.5mg to assess gi side effects he experiences in the morning   Increase tresiba to 38u daily     Patient was given instructions and counseling regarding his condition or for health maintenance advice. Please see specific information pulled into the AVS if appropriate.     ALTAGRACIA Rogers

## 2023-05-22 DIAGNOSIS — R80.9 MICROALBUMINURIA: Primary | ICD-10-CM

## 2023-05-22 DIAGNOSIS — E03.9 ACQUIRED HYPOTHYROIDISM: ICD-10-CM

## 2023-05-22 RX ORDER — LISINOPRIL 2.5 MG/1
2.5 TABLET ORAL DAILY
Qty: 90 TABLET | Refills: 3 | Status: SHIPPED | OUTPATIENT
Start: 2023-05-22

## 2023-05-23 RX ORDER — LEVOTHYROXINE SODIUM 0.05 MG/1
TABLET ORAL
Qty: 90 TABLET | Refills: 1 | Status: SHIPPED | OUTPATIENT
Start: 2023-05-23

## 2023-05-23 RX ORDER — PANTOPRAZOLE SODIUM 40 MG/1
40 TABLET, DELAYED RELEASE ORAL DAILY
Qty: 90 TABLET | Refills: 3 | Status: SHIPPED | OUTPATIENT
Start: 2023-05-23

## 2023-05-23 NOTE — TELEPHONE ENCOUNTER
Rx Refill Note  Requested Prescriptions     Pending Prescriptions Disp Refills    levothyroxine (SYNTHROID, LEVOTHROID) 50 MCG tablet [Pharmacy Med Name: LEVOTHYROXINE 50 MCG TABLET] 90 tablet 1     Sig: TAKE 1 TABLET BY MOUTH EVERY DAY      Last office visit with prescribing clinician: 5/12/2023   Last telemedicine visit with prescribing clinician: Visit date not found   Next office visit with prescribing clinician: 11/14/2023                         Would you like a call back once the refill request has been completed: [] Yes [] No    If the office needs to give you a call back, can they leave a voicemail: [] Yes [] No    Jeniffer Peña  05/23/23, 13:42 EDT

## 2023-06-22 NOTE — TELEPHONE ENCOUNTER
----- Message from ALTAGRACIA Engel sent at 3/31/2023 10:42 AM EDT -----  Please inform the patient lab work shows a elevated glucose at 168 please discuss further with endocrinology.  One of his liver function test the alkaline phosphatase is mildly elevated.  Recommend additional lab work to determine if this is coming f  rom bones or liver.  Labs have been ordered.  No anemia with normal thyroid function.  Please complete stool testing.   [Patient Optimized for Surgery] : Patient optimized for surgery [FreeTextEntry4] : preop labs reviewed - cbc with hgb of 11.1 - known history of anemia in setting of heavy menses - stable\par EKG: NSR, no ST or T wave changes\par \par Patient is low-intermediate risk for planned procedure. Patient is medically optimized at the time of this visit with no modifiable risk factors.

## 2023-08-04 DIAGNOSIS — Z79.4 TYPE 2 DIABETES MELLITUS WITHOUT COMPLICATION, WITH LONG-TERM CURRENT USE OF INSULIN: ICD-10-CM

## 2023-08-04 DIAGNOSIS — E11.9 TYPE 2 DIABETES MELLITUS WITHOUT COMPLICATION, WITH LONG-TERM CURRENT USE OF INSULIN: ICD-10-CM

## 2023-08-04 RX ORDER — LANCETS 30 GAUGE
EACH MISCELLANEOUS
Qty: 100 EACH | Refills: 4 | OUTPATIENT
Start: 2023-08-04

## 2023-08-08 ENCOUNTER — TELEPHONE (OUTPATIENT)
Dept: ENDOCRINOLOGY | Age: 83
End: 2023-08-08

## 2023-08-08 NOTE — TELEPHONE ENCOUNTER
Caller: SALVADOR NORIEGA     Relationship to patient: [facility]     Best call back number: [phone]    Patient is needing: SOMEONE TO CALL [employer] TO SEE WHY LANCETS ARE GETTING DENIED.

## 2023-08-09 DIAGNOSIS — Z79.4 TYPE 2 DIABETES MELLITUS WITHOUT COMPLICATION, WITH LONG-TERM CURRENT USE OF INSULIN: ICD-10-CM

## 2023-08-09 DIAGNOSIS — E11.9 TYPE 2 DIABETES MELLITUS WITHOUT COMPLICATION, WITH LONG-TERM CURRENT USE OF INSULIN: ICD-10-CM

## 2023-08-09 RX ORDER — LANCETS 30 GAUGE
1 EACH MISCELLANEOUS DAILY
Qty: 100 EACH | Refills: 0 | Status: SHIPPED | OUTPATIENT
Start: 2023-08-09 | End: 2023-11-07

## 2023-08-09 NOTE — TELEPHONE ENCOUNTER
Rx Refill Note  Requested Prescriptions     Signed Prescriptions Disp Refills    Lancets misc 100 each 0     Si each Daily for 90 days. USE TO TEST BLOOD SUGAR ONE TIME DAILY.     Authorizing Provider: TIMOTEO ORTIZ     Ordering User: LAURIE LABOY      Last office visit with prescribing clinician: 2023   Last telemedicine visit with prescribing clinician: Visit date not found   Next office visit with prescribing clinician: 2023                         Would you like a call back once the refill request has been completed: [] Yes [] No    If the office needs to give you a call back, can they leave a voicemail: [] Yes [] No    Laurie Laboy MA  23, 11:15 EDT

## 2023-10-31 ENCOUNTER — TELEPHONE (OUTPATIENT)
Dept: ENDOCRINOLOGY | Age: 83
End: 2023-10-31
Payer: COMMERCIAL

## 2023-10-31 DIAGNOSIS — Z79.4 TYPE 2 DIABETES MELLITUS WITH HYPERGLYCEMIA, WITH LONG-TERM CURRENT USE OF INSULIN: ICD-10-CM

## 2023-10-31 DIAGNOSIS — E11.9 TYPE 2 DIABETES MELLITUS WITHOUT COMPLICATION, WITH LONG-TERM CURRENT USE OF INSULIN: Primary | ICD-10-CM

## 2023-10-31 DIAGNOSIS — E11.65 TYPE 2 DIABETES MELLITUS WITH HYPERGLYCEMIA, WITH LONG-TERM CURRENT USE OF INSULIN: ICD-10-CM

## 2023-10-31 DIAGNOSIS — Z79.4 TYPE 2 DIABETES MELLITUS WITHOUT COMPLICATION, WITH LONG-TERM CURRENT USE OF INSULIN: Primary | ICD-10-CM

## 2023-11-01 ENCOUNTER — TELEPHONE (OUTPATIENT)
Dept: ENDOCRINOLOGY | Age: 83
End: 2023-11-01
Payer: COMMERCIAL

## 2023-11-01 DIAGNOSIS — E11.9 TYPE 2 DIABETES MELLITUS WITHOUT COMPLICATION, WITH LONG-TERM CURRENT USE OF INSULIN: ICD-10-CM

## 2023-11-01 DIAGNOSIS — E03.9 ACQUIRED HYPOTHYROIDISM: ICD-10-CM

## 2023-11-01 DIAGNOSIS — Z79.4 TYPE 2 DIABETES MELLITUS WITHOUT COMPLICATION, WITH LONG-TERM CURRENT USE OF INSULIN: ICD-10-CM

## 2023-11-01 DIAGNOSIS — R80.9 MICROALBUMINURIA: ICD-10-CM

## 2023-11-01 RX ORDER — LEVOTHYROXINE SODIUM 0.05 MG/1
50 TABLET ORAL DAILY
Qty: 90 TABLET | Refills: 0 | Status: SHIPPED | OUTPATIENT
Start: 2023-11-01

## 2023-11-01 RX ORDER — PANTOPRAZOLE SODIUM 40 MG/1
40 TABLET, DELAYED RELEASE ORAL DAILY
Qty: 90 TABLET | Refills: 3 | Status: SHIPPED | OUTPATIENT
Start: 2023-11-01

## 2023-11-01 RX ORDER — LANCETS 30 GAUGE
1 EACH MISCELLANEOUS DAILY
Qty: 100 EACH | Refills: 1 | Status: SHIPPED | OUTPATIENT
Start: 2023-11-01 | End: 2024-01-30

## 2023-11-01 RX ORDER — DIPHENHYDRAMINE HYDROCHLORIDE 25 MG/1
1 CAPSULE, LIQUID FILLED ORAL 2 TIMES DAILY
Qty: 1 EACH | Refills: 0 | OUTPATIENT
Start: 2023-11-01

## 2023-11-01 RX ORDER — DULAGLUTIDE 1.5 MG/.5ML
1.5 INJECTION, SOLUTION SUBCUTANEOUS WEEKLY
Qty: 6 ML | Refills: 0 | Status: SHIPPED | OUTPATIENT
Start: 2023-11-01

## 2023-11-01 RX ORDER — L.RHAMNOSUS/B.ANIMALIS(LACTIS) 3B CELL
1 CAPSULE ORAL DAILY
Qty: 30 CAPSULE | Refills: 5 | Status: SHIPPED | OUTPATIENT
Start: 2023-11-01

## 2023-11-01 RX ORDER — PEN NEEDLE, DIABETIC 32GX 5/32"
NEEDLE, DISPOSABLE MISCELLANEOUS
Qty: 100 EACH | Refills: 3 | Status: CANCELLED | OUTPATIENT
Start: 2023-11-01

## 2023-11-01 RX ORDER — LISINOPRIL 2.5 MG/1
2.5 TABLET ORAL DAILY
Qty: 90 TABLET | Refills: 3 | Status: SHIPPED | OUTPATIENT
Start: 2023-11-01

## 2023-11-01 NOTE — TELEPHONE ENCOUNTER
Rx Refill Note  Requested Prescriptions     Pending Prescriptions Disp Refills    BD Pen Needle Nola 2nd Gen 32G X 4 MM misc 100 each 3     Sig: USE WITH INSULIN EVERY DAY      Last office visit with prescribing clinician: 12/2/2022   Last telemedicine visit with prescribing clinician: Visit date not found   Next office visit with prescribing clinician: Visit date not found                         Would you like a call back once the refill request has been completed: [] Yes [] No    If the office needs to give you a call back, can they leave a voicemail: [] Yes [] No    Katherine Powell  11/01/23, 11:44 EDT

## 2023-11-02 RX ORDER — INSULIN DEGLUDEC 200 U/ML
38 INJECTION, SOLUTION SUBCUTANEOUS DAILY
Qty: 18 ML | Refills: 0 | Status: SHIPPED | OUTPATIENT
Start: 2023-11-02

## 2023-11-02 RX ORDER — DIPHENHYDRAMINE HYDROCHLORIDE 25 MG/1
1 CAPSULE, LIQUID FILLED ORAL 2 TIMES DAILY
Qty: 1 EACH | Refills: 0 | Status: SHIPPED | OUTPATIENT
Start: 2023-11-02

## 2023-11-03 ENCOUNTER — SPECIALTY PHARMACY (OUTPATIENT)
Dept: ENDOCRINOLOGY | Age: 83
End: 2023-11-03
Payer: COMMERCIAL

## 2023-11-03 NOTE — PROGRESS NOTES
Specialty Pharmacy Patient Management Program  Endocrinology Initial Assessment     Bennie Greene is a male seen by an Endocrinology provider for Type 2 Diabetes and enrolled in the Endocrinology Patient Management program offered by [facility].  An initial outreach was conducted, including assessment of therapy appropriateness and specialty medication education for Tresiba. The patient was introduced to services offered by [facility], including: regular assessments, refill coordination, curbside pick-up or mail order delivery options, prior authorization maintenance, and financial assistance programs as applicable. The patient was also provided with contact information for the pharmacy team.     Insurance Coverage & Financial Support  [employer] Part D plan     Relevant Past Medical History and Comorbidities  Relevant medical history and concomitant health conditions were discussed with the patient. The patient's chart has been reviewed for relevant past medical history and comorbid conditions and updated as necessary.  Past Medical History:   Diagnosis Date    Diabetes mellitus     Dyspepsia     Erectile dysfunction     Gastritis 04/09/2014    Marked gastritis by EGD- Dr. BRAXTON Garcia; H. Pylori +    Gastroparesis     s/p gastrectomy    GERD (gastroesophageal reflux disease)     Personal history of malignant neoplasm of prostate     2006 s/p XRT    Pes cavus     SCC (squamous cell carcinoma)     scalp/ neck    Type 2 diabetes mellitus 2015     Social History     Socioeconomic History    Marital status:    Tobacco Use    Smoking status: Former     Years: 3     Types: Cigarettes    Smokeless tobacco: Never   Substance and Sexual Activity    Alcohol use: No    Drug use: No       Problem list reviewed by Marika Lyons, PharmD on 11/3/2023 at 12:36 PM    Allergies  Known allergies and reactions were discussed with the patient. The patient's chart has been  reviewed for  allergy information and updated as necessary.   No Known Allergies    Allergies reviewed by Marika Lyons PharmD on 11/3/2023 at 12:35 PM    Relevant Laboratory Values  Relevant laboratory values were discussed with the patient. The following specialty medication dose adjustment(s) are recommended: No dosage adjustments recommended  A1C Last 3 Results          11/17/2022    08:42 5/3/2023    09:19   HGBA1C Last 3 Results   Hemoglobin A1C 7.70  7.90      Lab Results   Component Value Date    HGBA1C 7.90 (H) 05/03/2023     Lab Results   Component Value Date    GLUCOSE 100 (H) 05/03/2023    CALCIUM 9.8 05/03/2023     05/03/2023    K 4.2 05/03/2023    CO2 30.4 (H) 05/03/2023     05/03/2023    BUN 20 05/03/2023    CREATININE 1.10 05/03/2023    EGFRIFAFRI 76 12/28/2021    EGFRIFNONA 66 12/28/2021    BCR 18.2 05/03/2023    ANIONGAP 11.7 11/01/2021     Lab Results   Component Value Date    CHLPL 143 05/03/2023    TRIG 93 05/03/2023    HDL 39 (L) 05/03/2023    LDL 86 05/03/2023     Microalbumin          5/12/2023    10:31   Microalbumin   Microalbumin, Urine 71.3        Current Medication List  This medication list has been reviewed with the patient and evaluated for any interactions or necessary modifications/recommendations, and updated to include all prescription medications, OTC medications, and supplements the patient is currently taking.  This list reflects what is contained in the patient's profile, which has also been marked as reviewed to communicate to other providers it is the most up to date version of the patient's current medication therapy.     Current Outpatient Medications:     BD Pen Needle Nola 2nd Gen 32G X 4 MM misc, USE WITH INSULIN EVERY DAY, Disp: 100 each, Rfl: 3    Blood Glucose Monitoring Suppl (Blood Glucose Monitor System) w/Device kit, Use to test 2 (Two) Times a Day., Disp: 1 each, Rfl: 0    Dulaglutide (Trulicity) 1.5 MG/0.5ML solution pen-injector, Inject 1.5 mg  under the skin into the appropriate area as directed 1 (One) Time Per Week. Indications: Type 2 Diabetes (Patient not taking: Reported on 11/3/2023), Disp: 6 mL, Rfl: 0    Dulaglutide (Trulicity) 3 MG/0.5ML solution pen-injector, Inject 0.5 mL under the skin into the appropriate area as directed 1 (One) Time Per Week for 90 days. (Patient not taking: Reported on 11/3/2023), Disp: 2 mL, Rfl: 2    glucose blood test strip, Use one test strip 2 (Two) Times a Day. Use as instructed, Disp: 200 each, Rfl: 11    Lancets misc, Use 1 Daily to check blood sugar, Disp: 100 each, Rfl: 1    levothyroxine (SYNTHROID, LEVOTHROID) 50 MCG tablet, Take 1 tablet by mouth Daily. Indications: Underactive Thyroid, Disp: 90 tablet, Rfl: 0    lisinopril (PRINIVIL,ZESTRIL) 2.5 MG tablet, Take 1 tablet by mouth Daily., Disp: 90 tablet, Rfl: 3    mupirocin (BACTROBAN) 2 % ointment, Apply 1 application topically to the appropriate area as directed 3 (Three) Times a Day. (Patient not taking: Reported on 11/3/2023), Disp: , Rfl:     pantoprazole (PROTONIX) 40 MG EC tablet, Take 1 tablet by mouth Daily., Disp: 90 tablet, Rfl: 3    Probiotic Product (Prevently) capsule, Take 1 tablet by mouth Daily., Disp: 30 capsule, Rfl: 5    Tresiba FlexTouch 200 UNIT/ML solution pen-injector pen injection, Inject 38 Units under the skin into the appropriate area as directed Daily., Disp: 18 mL, Rfl: 0    Medicines reviewed by Marika Lyons, PharmD on 11/3/2023 at 12:36 PM    Drug Interactions  No significant DDIs identified  Recommended Medications Assessment  Aspirin: Not Taking Currently  Statin: Not Taking Currently  ACEi/ARB: Currently Taking     Initial Education Provided for Specialty Medication  The patient has been on Tresiba for some time and tolerating well. Additional patient education shall be provided and documented upon request by the patient, provider, or payer.    TRESIBA® (insulin degludec)   Medication Expectations    Why am I  taking this medication?  You are taking this medication to lower blood sugar and A1c because you have type 1 or type 2 diabetes. Diabetes is not curable but with proper medication and treatment, we can keep your blood sugar within your personalized target range.    What should I expect while on this medication?  You should expect to see your blood sugar and A1c decrease over time.    How does the medication work?  Tresiba is a long-acting insulin that slowly and steadily releases in the body for 24-hours of blood sugar control. Insulin helps the sugar in your blood get into cells and provide them with energy to fuel your body.     How long will I be on this medication for?  If you have Type 1 diabetes, you will be on this medication or another insulin throughout your lifetime because your body cannot make its own insulin. If you have Type 2 diabetes, the amount of time you will be on this medication will be determined by your doctor based on blood sugar and A1c control. You will most likely be on this medication or another diabetes medication throughout your lifetime because your body does not make enough insulin. Do not abruptly stop this medication without talking to your doctor first.     How do I take this medication?  Take as directed on your prescription label. Tresiba is usually given once daily and can be taken with or without food. Tresiba is injected under the skin (subcutaneously) of your stomach, thigh, or upper arm. Use a different injection site in the same body region each day.       What are some possible side effects?  Tresiba may cause low blood sugar (hypoglycemia). Signs and symptoms that may indicate hypoglycemia include dizziness, sweating, anxiety, irritability, confusion, or headache. The most common side effects of Tresiba include reactions or skin thickening at the injection site, headache, weight gain, swelling of your hands and feet, and cough/runny nose/sore throat.    What happens if I  miss a dose?  If you miss a dose, take it as soon as your remember. If it is close to your next dose, skip it. Always make sure there are at least 8 hours between doses and never take 2 doses at once.       Medication Safety    What are things I should warn my doctor immediately about?  Talk to your doctor if you are pregnant, planning to become pregnant, or breastfeeding. Also tell your doctor if you notice any signs/symptoms of an allergic reaction (rash, hives, difficulty breathing, etc.).    What are things that I should be cautious of?  Be cautious of any side effects from this medication. Talk to your doctor if any new ones develop or aren't getting better.    What are some medications that can interact with this one?  Do not take this medication with rosiglitazone. Taking Tresiba with other medications that lower your blood sugar may increase the risk of hypoglycemia. Your doctor may reduce the dose of these medications when you start Tresiba. Always tell your doctor or pharmacist immediately if you start taking any new medications, including over-the-counter medications, vitamins, and herbal supplements.        Medication Storage/Handling    How should I handle this medication?  Keep this medication out of reach of pets/children and keep the pen capped when not in use. Do not share your medicine with others.     How does this medication need to be stored?  Store your new, unused pens in the original carton in the refrigerator. Protect it from light. Do not freeze. You may store your opened Tresiba at room temperature or in the refrigerator for up to 56 days (8 weeks). Always remove the needle from the pen before storing.     How should I dispose of this medication?  Used Tresiba pens should be thrown away after 56 days even if insulin remains.?Place your used pen and needle in an approved sharps container. ?If your doctor decides to stop this medication, take to your local police station for proper disposal.  Some pharmacies also have?take-back bins for medication drop-off.??       Resources/Support    How can I remind myself to take this medication?  You can download reminder apps to help you manage your refills. You may also set an alarm on your phone to remind you.     Is financial support available?   [employer] can provide co-pay cards if you have commercial insurance or patient assistance if you have Medicare or no insurance.     Which vaccines are recommended for me?  Talk to your doctor about these vaccines: Flu, Coronavirus (COVID-19), Pneumococcal (pneumonia), Tdap, Hepatitis B, Zoster (shingles)         Adherence and Self-Administration  Adherence related to the patient's specialty therapy was discussed with the patient. The Adherence segment of this outreach has been reviewed and updated.     Is there a concern with patient's ability to self administer the medication correctly and without issue?: No  Were any potential barriers to adherence identified during the initial assessment or patient education?: No  Are there any concerns regarding the patient's understanding of the importance of medication adherence?: No  Methods for Supporting Patient Adherence and/or Self-Administration: N/A    Open Medication Therapy Problems  No medication therapy recommendations to display    Goals of Therapy  Goals related to the patient's specialty therapy were discussed with the patient. The Patient Goals segment of this outreach has been reviewed and updated.   Goals Addressed Today        Specialty Pharmacy General Goal      A1c < 7%    Lab Results   Component Value Date    HGBA1C 7.90 (H) 05/03/2023    HGBA1C 7.70 (H) 11/17/2022    HGBA1C 8.5 (H) 07/06/2022    HGBA1C 9.2 (H) 03/29/2022    HGBA1C 8.3 (H) 12/28/2021               Reassessment Plan & Follow-Up  1. Medication Therapy Changes: Pt reports taking Tresiba 40 units daily. Was previously on Trulicity, but experienced GI side effects at 3mg dose. This was restarted  at 1.5mg 5/12/23 to see if was improved for patient, but pt reports having stopped the Trulicity due to these side effects.   2. Related Plans, Therapy Recommendations, or Therapy Problems to Be Addressed: None  3. Pharmacist to perform regular assessments no more than (6) months from the previous assessment.  4. Care Coordinator to set up future refill outreaches, coordinate prescription delivery, and escalate clinical questions to pharmacist.  5. Welcome information and patient satisfaction survey to be sent by specialty pharmacy team with patient's initial fill.    Attestation  Therapeutic appropriateness: Appropriate   I attest the patient was actively involved in and has agreed to the above plan of care. If the prescribed therapy is at any point deemed not appropriate based on the current or future assessments, a consultation will be initiated with the patient's specialty care provider to determine the best course of action. The revised plan of therapy will be documented along with any required assessments and/or additional patient education provided.     Marika Lyons, PharmD, BCPS, BCCCP  Clinical Specialty Pharmacist, Endocrinology  11/3/2023  12:38 EDT

## 2023-11-06 DIAGNOSIS — Z79.4 TYPE 2 DIABETES MELLITUS WITH HYPERGLYCEMIA, WITH LONG-TERM CURRENT USE OF INSULIN: ICD-10-CM

## 2023-11-06 DIAGNOSIS — E11.65 TYPE 2 DIABETES MELLITUS WITH HYPERGLYCEMIA, WITH LONG-TERM CURRENT USE OF INSULIN: ICD-10-CM

## 2023-11-07 LAB
ALBUMIN/CREAT UR: 145 MG/G CREAT (ref 0–29)
BUN SERPL-MCNC: 19 MG/DL (ref 8–23)
BUN/CREAT SERPL: 19 (ref 7–25)
CALCIUM SERPL-MCNC: 9.2 MG/DL (ref 8.6–10.5)
CHLORIDE SERPL-SCNC: 101 MMOL/L (ref 98–107)
CO2 SERPL-SCNC: 30.5 MMOL/L (ref 22–29)
CREAT SERPL-MCNC: 1 MG/DL (ref 0.76–1.27)
CREAT UR-MCNC: 94.5 MG/DL
EGFRCR SERPLBLD CKD-EPI 2021: 75.1 ML/MIN/1.73
GLUCOSE SERPL-MCNC: 289 MG/DL (ref 65–99)
HBA1C MFR BLD: 10.9 % (ref 4.8–5.6)
MICROALBUMIN UR-MCNC: 136.9 UG/ML
POTASSIUM SERPL-SCNC: 4.2 MMOL/L (ref 3.5–5.2)
SODIUM SERPL-SCNC: 139 MMOL/L (ref 136–145)
TSH SERPL DL<=0.005 MIU/L-ACNC: 2.93 UIU/ML (ref 0.27–4.2)

## 2023-11-09 ENCOUNTER — FLU SHOT (OUTPATIENT)
Dept: INTERNAL MEDICINE | Facility: CLINIC | Age: 83
End: 2023-11-09
Payer: COMMERCIAL

## 2023-11-09 DIAGNOSIS — Z23 NEED FOR INFLUENZA VACCINATION: Primary | ICD-10-CM

## 2023-11-09 PROCEDURE — 90662 IIV NO PRSV INCREASED AG IM: CPT | Performed by: FAMILY MEDICINE

## 2023-11-09 PROCEDURE — 90471 IMMUNIZATION ADMIN: CPT | Performed by: FAMILY MEDICINE

## 2023-11-14 ENCOUNTER — OFFICE VISIT (OUTPATIENT)
Dept: ENDOCRINOLOGY | Age: 83
End: 2023-11-14
Payer: MEDICARE

## 2023-11-14 VITALS
TEMPERATURE: 96.4 F | OXYGEN SATURATION: 97 % | WEIGHT: 174.2 LBS | SYSTOLIC BLOOD PRESSURE: 130 MMHG | HEIGHT: 70 IN | DIASTOLIC BLOOD PRESSURE: 70 MMHG | BODY MASS INDEX: 24.94 KG/M2 | HEART RATE: 61 BPM

## 2023-11-14 DIAGNOSIS — E11.65 TYPE 2 DIABETES MELLITUS WITH HYPERGLYCEMIA, WITH LONG-TERM CURRENT USE OF INSULIN: Primary | ICD-10-CM

## 2023-11-14 DIAGNOSIS — Z79.4 TYPE 2 DIABETES MELLITUS WITH HYPERGLYCEMIA, WITH LONG-TERM CURRENT USE OF INSULIN: Primary | ICD-10-CM

## 2023-11-14 PROCEDURE — 99214 OFFICE O/P EST MOD 30 MIN: CPT | Performed by: NURSE PRACTITIONER

## 2023-11-14 NOTE — PROGRESS NOTES
"Chief Complaint  Diabetes (Checks blood sugars periodically this morning was 129, yesterday was 122)    Subjective        Bennie Greene presents to [facility]  History of Present Illness    Wife had shoulder surgery so he was having to eat out more   She is doing better now and so is he, BS average now 120s  He is now eating much better and BS are doing much better     Trulicity was change from 3mg to 1.5mg weekly     Metformin caused GI upset    Type 2 dm   Diagnosed about 10 + years ago.   Today in clinic pt reports being on tresiba 40 units in the am, trulicity 1.5 mg subq once a week   Last eye exam - over due   No known diabetic complications  Episodes of hypoglycemia - no      Objective   Vital Signs:  /70   Pulse 61   Temp 96.4 °F (35.8 °C) (Temporal)   Ht 177.8 cm (70\")   Wt 79 kg (174 lb 3.2 oz)   SpO2 97%   BMI 25.00 kg/m²   Estimated body mass index is 25 kg/m² as calculated from the following:    Height as of this encounter: 177.8 cm (70\").    Weight as of this encounter: 79 kg (174 lb 3.2 oz).               Physical Exam  Vitals reviewed.   Constitutional:       General: He is not in acute distress.  HENT:      Head: Normocephalic and atraumatic.   Cardiovascular:      Rate and Rhythm: Normal rate.   Pulmonary:      Effort: Pulmonary effort is normal. No respiratory distress.   Musculoskeletal:         General: No signs of injury. Normal range of motion.      Cervical back: Normal range of motion and neck supple.   Skin:     General: Skin is warm and dry.   Neurological:      Mental Status: He is alert and oriented to person, place, and time. Mental status is at baseline.   Psychiatric:         Mood and Affect: Mood normal.         Behavior: Behavior normal.         Thought Content: Thought content normal.         Judgment: Judgment normal.        Result Review :  The following data was reviewed by: ALTAGRACIA Rogers on 11/14/2023:  Common labs          " 5/3/2023    09:19 5/12/2023    10:31 11/6/2023    11:36   Common Labs   Glucose 100   289    BUN 20   19    Creatinine 1.10   1.00    Sodium 143   139    Potassium 4.2   4.2    Chloride 103   101    Calcium 9.8   9.2    Total Cholesterol 143      Triglycerides 93      HDL Cholesterol 39      LDL Cholesterol  86      Hemoglobin A1C 7.90   10.90    Microalbumin, Urine  71.3  136.9    PSA  0.094                    Assessment and Plan   Diagnoses and all orders for this visit:    1. Type 2 diabetes mellitus with hyperglycemia, with long-term current use of insulin (Primary)  -     Basic Metabolic Panel; Future  -     Fructosamine; Future             Follow Up   Return in about 3 months (around 2/14/2024).    A1c worrisome for complications; high risk, however he thinks things are much better now   Repeat labs in 4 weeks- will add additional medication if persistent hyperglycemia   Discussed increased risk of stroke and heart attack  Continue tresiba and trulicity and check BS at least 4x/day  To notify me for any BS > 200    Patient was given instructions and counseling regarding his condition or for health maintenance advice. Please see specific information pulled into the AVS if appropriate.     Mary Ann Carvajal, APRN

## 2023-11-28 ENCOUNTER — SPECIALTY PHARMACY (OUTPATIENT)
Dept: ENDOCRINOLOGY | Age: 83
End: 2023-11-28
Payer: COMMERCIAL

## 2023-11-28 NOTE — PROGRESS NOTES
Specialty Pharmacy Refill Coordination Note     Bennie is a 83 y.o. male contacted today regarding refills of  1 specialty medication(s).    Reviewed and verified with patient:       Specialty medication(s) and dose(s) confirmed: yes    Refill Questions      Flowsheet Row Most Recent Value   Changes to allergies? No   Changes to medications? No   New conditions since last clinic visit No   Unplanned office visit, urgent care, ED, or hospital admission in the last 4 weeks  No   How does patient/caregiver feel medication is working? Good   Financial problems or insurance changes  No   Since the previous refill, were any specialty medication doses or scheduled injections missed or delayed?  No   Does this patient require a clinical escalation to a pharmacist? No            Delivery Questions      Flowsheet Row Most Recent Value   Delivery method Other (Comment)  [beeline]   Delivery address correct? Yes   Delivery phone number [phone]   Number of medications in delivery 1   Medication(s) being filled and delivered Levothyroxine Sodium (Thyroid Hormones)   Doses left of specialty medications 1 week   Is there any medication that is due not being filled? No   Supplies needed? No supplies needed   Cooler needed? No   Do any medications need mixed or dated? No   Copay form of payment Credit card on file   Additional comments $0.21   Questions or concerns for the pharmacist? No   Are any medications first time fills? No   Shipment status Delivery complete                   Follow-up: 25 day(s)     Shelby Alvarez, Pharmacy Technician  Specialty Pharmacy Technician

## 2023-12-12 DIAGNOSIS — E11.65 TYPE 2 DIABETES MELLITUS WITH HYPERGLYCEMIA, WITH LONG-TERM CURRENT USE OF INSULIN: ICD-10-CM

## 2023-12-12 DIAGNOSIS — Z79.4 TYPE 2 DIABETES MELLITUS WITHOUT COMPLICATION, WITH LONG-TERM CURRENT USE OF INSULIN: ICD-10-CM

## 2023-12-12 DIAGNOSIS — Z79.4 TYPE 2 DIABETES MELLITUS WITH HYPERGLYCEMIA, WITH LONG-TERM CURRENT USE OF INSULIN: ICD-10-CM

## 2023-12-12 DIAGNOSIS — E11.9 TYPE 2 DIABETES MELLITUS WITHOUT COMPLICATION, WITH LONG-TERM CURRENT USE OF INSULIN: ICD-10-CM

## 2023-12-12 RX ORDER — PEN NEEDLE, DIABETIC 32GX 5/32"
1 NEEDLE, DISPOSABLE MISCELLANEOUS DAILY
Qty: 100 EACH | Refills: 3 | Status: SHIPPED | OUTPATIENT
Start: 2023-12-12

## 2023-12-12 NOTE — TELEPHONE ENCOUNTER
Rx Refill Note  Requested Prescriptions     Pending Prescriptions Disp Refills    BD Pen Needle Nola 2nd Gen 32G X 4 MM misc 100 each 3     Sig: USE WITH INSULIN EVERY DAY      Last office visit with prescribing clinician: 11/14/2023   Last telemedicine visit with prescribing clinician: Visit date not found   Next office visit with prescribing clinician: 3/14/2024                         Would you like a call back once the refill request has been completed: [] Yes [] No    If the office needs to give you a call back, can they leave a voicemail: [] Yes [] No    Katherine Powell  12/12/23, 09:42 EST

## 2023-12-13 LAB
BUN SERPL-MCNC: 19 MG/DL (ref 8–23)
BUN/CREAT SERPL: 17.8 (ref 7–25)
CALCIUM SERPL-MCNC: 9.2 MG/DL (ref 8.6–10.5)
CHLORIDE SERPL-SCNC: 108 MMOL/L (ref 98–107)
CO2 SERPL-SCNC: 27.8 MMOL/L (ref 22–29)
CREAT SERPL-MCNC: 1.07 MG/DL (ref 0.76–1.27)
EGFRCR SERPLBLD CKD-EPI 2021: 69.3 ML/MIN/1.73
FRUCTOSAMINE SERPL-SCNC: 384 UMOL/L (ref 0–285)
GLUCOSE SERPL-MCNC: 189 MG/DL (ref 65–99)
POTASSIUM SERPL-SCNC: 4.4 MMOL/L (ref 3.5–5.2)
SODIUM SERPL-SCNC: 144 MMOL/L (ref 136–145)

## 2023-12-22 ENCOUNTER — SPECIALTY PHARMACY (OUTPATIENT)
Dept: ENDOCRINOLOGY | Age: 83
End: 2023-12-22
Payer: COMMERCIAL

## 2023-12-22 NOTE — PROGRESS NOTES
Specialty Pharmacy Refill Coordination Note     Bennie is a 83 y.o. male contacted today regarding refills of  2 specialty medication(s).    Reviewed and verified with patient:       Specialty medication(s) and dose(s) confirmed: yes    Refill Questions      Flowsheet Row Most Recent Value   Changes to allergies? No   Changes to medications? No   New conditions or infections since last clinic visit No   Unplanned office visit, urgent care, ED, or hospital admission in the last 4 weeks  No   How does patient/caregiver feel medication is working? Good   Financial problems or insurance changes  No   Since the previous refill, were any specialty medication doses or scheduled injections missed or delayed?  No   Does this patient require a clinical escalation to a pharmacist? No            Delivery Questions      Flowsheet Row Most Recent Value   Delivery method Beeline   Delivery address verified with patient/caregiver? Yes   Delivery address Home   Medication(s) being filled and delivered Lisinopril (Ace Inhibitors), Insulin Degludec   Doses left of specialty medications 1 week   Copay verified? Yes   Copay amount 116.98   Copay form of payment Credit/debit on file                   Follow-up: 25 day(s)     Shelby Alvarez, Pharmacy Technician  Specialty Pharmacy Technician

## 2024-01-01 ENCOUNTER — HOSPITAL ENCOUNTER (INPATIENT)
Facility: HOSPITAL | Age: 84
LOS: 5 days | End: 2024-09-23
Attending: STUDENT IN AN ORGANIZED HEALTH CARE EDUCATION/TRAINING PROGRAM | Admitting: STUDENT IN AN ORGANIZED HEALTH CARE EDUCATION/TRAINING PROGRAM
Payer: MEDICARE

## 2024-01-01 ENCOUNTER — APPOINTMENT (OUTPATIENT)
Dept: CT IMAGING | Facility: HOSPITAL | Age: 84
End: 2024-01-01
Payer: MEDICARE

## 2024-01-01 ENCOUNTER — TELEPHONE (OUTPATIENT)
Dept: INTERNAL MEDICINE | Facility: CLINIC | Age: 84
End: 2024-01-01
Payer: COMMERCIAL

## 2024-01-01 VITALS
SYSTOLIC BLOOD PRESSURE: 77 MMHG | BODY MASS INDEX: 19.6 KG/M2 | HEIGHT: 70 IN | OXYGEN SATURATION: 94 % | TEMPERATURE: 99.1 F | DIASTOLIC BLOOD PRESSURE: 52 MMHG | WEIGHT: 136.91 LBS

## 2024-01-01 DIAGNOSIS — R18.8 FREE FLUID IN PELVIS: ICD-10-CM

## 2024-01-01 DIAGNOSIS — R44.3 HALLUCINATIONS: ICD-10-CM

## 2024-01-01 DIAGNOSIS — E87.5 HYPERKALEMIA: ICD-10-CM

## 2024-01-01 DIAGNOSIS — R62.7 FAILURE TO THRIVE IN ADULT: ICD-10-CM

## 2024-01-01 DIAGNOSIS — N17.9 AKI (ACUTE KIDNEY INJURY): Primary | ICD-10-CM

## 2024-01-01 DIAGNOSIS — N39.0 COMPLICATED UTI (URINARY TRACT INFECTION): ICD-10-CM

## 2024-01-01 LAB
ALBUMIN SERPL-MCNC: 2.6 G/DL (ref 3.5–5.2)
ALBUMIN SERPL-MCNC: 2.8 G/DL (ref 3.5–5.2)
ALBUMIN SERPL-MCNC: 3.1 G/DL (ref 3.5–5.2)
ALBUMIN/GLOB SERPL: 0.7 G/DL
ALBUMIN/GLOB SERPL: 0.7 G/DL
ALBUMIN/GLOB SERPL: 0.8 G/DL
ALP SERPL-CCNC: 102 U/L (ref 39–117)
ALP SERPL-CCNC: 111 U/L (ref 39–117)
ALP SERPL-CCNC: 144 U/L (ref 39–117)
ALT SERPL W P-5'-P-CCNC: 14 U/L (ref 1–41)
ALT SERPL W P-5'-P-CCNC: 16 U/L (ref 1–41)
ALT SERPL W P-5'-P-CCNC: 17 U/L (ref 1–41)
ANION GAP SERPL CALCULATED.3IONS-SCNC: 14 MMOL/L (ref 5–15)
ANION GAP SERPL CALCULATED.3IONS-SCNC: 15 MMOL/L (ref 5–15)
ANION GAP SERPL CALCULATED.3IONS-SCNC: 7 MMOL/L (ref 5–15)
ANION GAP SERPL CALCULATED.3IONS-SCNC: 9 MMOL/L (ref 5–15)
AST SERPL-CCNC: 13 U/L (ref 1–40)
AST SERPL-CCNC: 18 U/L (ref 1–40)
AST SERPL-CCNC: 20 U/L (ref 1–40)
BACTERIA ISLT: NORMAL
BACTERIA SPEC AEROBE CULT: NORMAL
BACTERIA SPEC AEROBE CULT: NORMAL
BACTERIA UR QL AUTO: ABNORMAL /HPF
BASOPHILS # BLD AUTO: 0.03 10*3/MM3 (ref 0–0.2)
BASOPHILS NFR BLD AUTO: 0.2 % (ref 0–1.5)
BILIRUB SERPL-MCNC: 0.2 MG/DL (ref 0–1.2)
BILIRUB UR QL STRIP: NEGATIVE
BUN SERPL-MCNC: 54 MG/DL (ref 8–23)
BUN SERPL-MCNC: 63 MG/DL (ref 8–23)
BUN SERPL-MCNC: 70 MG/DL (ref 8–23)
BUN SERPL-MCNC: 72 MG/DL (ref 8–23)
BUN/CREAT SERPL: 31.7 (ref 7–25)
BUN/CREAT SERPL: 32 (ref 7–25)
BUN/CREAT SERPL: 32.7 (ref 7–25)
BUN/CREAT SERPL: 32.9 (ref 7–25)
CALCIUM SPEC-SCNC: 10.4 MG/DL (ref 8.6–10.5)
CALCIUM SPEC-SCNC: 11 MG/DL (ref 8.6–10.5)
CALCIUM SPEC-SCNC: 11.5 MG/DL (ref 8.6–10.5)
CALCIUM SPEC-SCNC: 12.2 MG/DL (ref 8.6–10.5)
CHLORIDE SERPL-SCNC: 111 MMOL/L (ref 98–107)
CHLORIDE SERPL-SCNC: 116 MMOL/L (ref 98–107)
CHLORIDE SERPL-SCNC: 124 MMOL/L (ref 98–107)
CHLORIDE SERPL-SCNC: 125 MMOL/L (ref 98–107)
CLARITY UR: ABNORMAL
CO2 SERPL-SCNC: 15 MMOL/L (ref 22–29)
CO2 SERPL-SCNC: 16 MMOL/L (ref 22–29)
CO2 SERPL-SCNC: 17 MMOL/L (ref 22–29)
CO2 SERPL-SCNC: 18 MMOL/L (ref 22–29)
COLOR UR: YELLOW
CREAT SERPL-MCNC: 1.69 MG/DL (ref 0.76–1.27)
CREAT SERPL-MCNC: 1.99 MG/DL (ref 0.76–1.27)
CREAT SERPL-MCNC: 2.13 MG/DL (ref 0.76–1.27)
CREAT SERPL-MCNC: 2.2 MG/DL (ref 0.76–1.27)
D-LACTATE SERPL-SCNC: 1.3 MMOL/L (ref 0.5–2)
D-LACTATE SERPL-SCNC: 2 MMOL/L (ref 0.5–2)
DEPRECATED RDW RBC AUTO: 52.4 FL (ref 37–54)
DEPRECATED RDW RBC AUTO: 52.8 FL (ref 37–54)
DEPRECATED RDW RBC AUTO: 54.2 FL (ref 37–54)
DEPRECATED RDW RBC AUTO: 55.5 FL (ref 37–54)
EGFRCR SERPLBLD CKD-EPI 2021: 29 ML/MIN/1.73
EGFRCR SERPLBLD CKD-EPI 2021: 30.3 ML/MIN/1.73
EGFRCR SERPLBLD CKD-EPI 2021: 32.7 ML/MIN/1.73
EGFRCR SERPLBLD CKD-EPI 2021: 39.8 ML/MIN/1.73
EOSINOPHIL # BLD AUTO: 0 10*3/MM3 (ref 0–0.4)
EOSINOPHIL NFR BLD AUTO: 0 % (ref 0.3–6.2)
ERYTHROCYTE [DISTWIDTH] IN BLOOD BY AUTOMATED COUNT: 17 % (ref 12.3–15.4)
ERYTHROCYTE [DISTWIDTH] IN BLOOD BY AUTOMATED COUNT: 17.1 % (ref 12.3–15.4)
ERYTHROCYTE [DISTWIDTH] IN BLOOD BY AUTOMATED COUNT: 17.3 % (ref 12.3–15.4)
ERYTHROCYTE [DISTWIDTH] IN BLOOD BY AUTOMATED COUNT: 18.1 % (ref 12.3–15.4)
GLOBULIN UR ELPH-MCNC: 3.4 GM/DL
GLOBULIN UR ELPH-MCNC: 4 GM/DL
GLOBULIN UR ELPH-MCNC: 4.7 GM/DL
GLUCOSE BLDC GLUCOMTR-MCNC: 107 MG/DL (ref 70–130)
GLUCOSE BLDC GLUCOMTR-MCNC: 108 MG/DL (ref 70–130)
GLUCOSE BLDC GLUCOMTR-MCNC: 162 MG/DL (ref 70–130)
GLUCOSE BLDC GLUCOMTR-MCNC: 162 MG/DL (ref 70–130)
GLUCOSE BLDC GLUCOMTR-MCNC: 198 MG/DL (ref 70–130)
GLUCOSE BLDC GLUCOMTR-MCNC: 204 MG/DL (ref 70–130)
GLUCOSE BLDC GLUCOMTR-MCNC: 273 MG/DL (ref 70–130)
GLUCOSE BLDC GLUCOMTR-MCNC: 47 MG/DL (ref 70–130)
GLUCOSE BLDC GLUCOMTR-MCNC: 51 MG/DL (ref 70–130)
GLUCOSE BLDC GLUCOMTR-MCNC: 66 MG/DL (ref 70–130)
GLUCOSE BLDC GLUCOMTR-MCNC: 70 MG/DL (ref 70–130)
GLUCOSE SERPL-MCNC: 102 MG/DL (ref 65–99)
GLUCOSE SERPL-MCNC: 120 MG/DL (ref 65–99)
GLUCOSE SERPL-MCNC: 150 MG/DL (ref 65–99)
GLUCOSE SERPL-MCNC: 156 MG/DL (ref 65–99)
GLUCOSE SERPL-MCNC: 173 MG/DL (ref 65–99)
GLUCOSE UR STRIP-MCNC: NEGATIVE MG/DL
HCT VFR BLD AUTO: 32.1 % (ref 37.5–51)
HCT VFR BLD AUTO: 33.8 % (ref 37.5–51)
HCT VFR BLD AUTO: 38.2 % (ref 37.5–51)
HCT VFR BLD AUTO: 42.7 % (ref 37.5–51)
HGB BLD-MCNC: 10.5 G/DL (ref 13–17.7)
HGB BLD-MCNC: 11.7 G/DL (ref 13–17.7)
HGB BLD-MCNC: 13.2 G/DL (ref 13–17.7)
HGB BLD-MCNC: 9.9 G/DL (ref 13–17.7)
HGB UR QL STRIP.AUTO: ABNORMAL
HOLD SPECIMEN: NORMAL
HOLD SPECIMEN: NORMAL
IMM GRANULOCYTES # BLD AUTO: 0.13 10*3/MM3 (ref 0–0.05)
IMM GRANULOCYTES NFR BLD AUTO: 0.8 % (ref 0–0.5)
KETONES UR QL STRIP: ABNORMAL
LEUKOCYTE ESTERASE UR QL STRIP.AUTO: ABNORMAL
LIPASE SERPL-CCNC: 9 U/L (ref 13–60)
LYMPHOCYTES # BLD AUTO: 1.2 10*3/MM3 (ref 0.7–3.1)
LYMPHOCYTES NFR BLD AUTO: 6.9 % (ref 19.6–45.3)
MCH RBC QN AUTO: 26 PG (ref 26.6–33)
MCH RBC QN AUTO: 26.1 PG (ref 26.6–33)
MCH RBC QN AUTO: 26.6 PG (ref 26.6–33)
MCH RBC QN AUTO: 26.8 PG (ref 26.6–33)
MCHC RBC AUTO-ENTMCNC: 30.6 G/DL (ref 31.5–35.7)
MCHC RBC AUTO-ENTMCNC: 30.8 G/DL (ref 31.5–35.7)
MCHC RBC AUTO-ENTMCNC: 30.9 G/DL (ref 31.5–35.7)
MCHC RBC AUTO-ENTMCNC: 31.1 G/DL (ref 31.5–35.7)
MCV RBC AUTO: 84.3 FL (ref 79–97)
MCV RBC AUTO: 85.3 FL (ref 79–97)
MCV RBC AUTO: 85.8 FL (ref 79–97)
MCV RBC AUTO: 86.8 FL (ref 79–97)
MONOCYTES # BLD AUTO: 0.58 10*3/MM3 (ref 0.1–0.9)
MONOCYTES NFR BLD AUTO: 3.4 % (ref 5–12)
NEUTROPHILS NFR BLD AUTO: 15.33 10*3/MM3 (ref 1.7–7)
NEUTROPHILS NFR BLD AUTO: 88.7 % (ref 42.7–76)
NITRITE UR QL STRIP: NEGATIVE
NRBC BLD AUTO-RTO: 0 /100 WBC (ref 0–0.2)
PH UR STRIP.AUTO: 6.5 [PH] (ref 5–8)
PLATELET # BLD AUTO: 301 10*3/MM3 (ref 140–450)
PLATELET # BLD AUTO: 336 10*3/MM3 (ref 140–450)
PLATELET # BLD AUTO: 350 10*3/MM3 (ref 140–450)
PLATELET # BLD AUTO: 400 10*3/MM3 (ref 140–450)
PMV BLD AUTO: 10.3 FL (ref 6–12)
PMV BLD AUTO: 9.6 FL (ref 6–12)
PMV BLD AUTO: 9.8 FL (ref 6–12)
PMV BLD AUTO: 9.9 FL (ref 6–12)
POTASSIUM SERPL-SCNC: 3.6 MMOL/L (ref 3.5–5.2)
POTASSIUM SERPL-SCNC: 4 MMOL/L (ref 3.5–5.2)
POTASSIUM SERPL-SCNC: 4.6 MMOL/L (ref 3.5–5.2)
POTASSIUM SERPL-SCNC: 5.5 MMOL/L (ref 3.5–5.2)
PROT SERPL-MCNC: 6 G/DL (ref 6–8.5)
PROT SERPL-MCNC: 6.8 G/DL (ref 6–8.5)
PROT SERPL-MCNC: 7.8 G/DL (ref 6–8.5)
PROT UR QL STRIP: ABNORMAL
RBC # BLD AUTO: 3.81 10*6/MM3 (ref 4.14–5.8)
RBC # BLD AUTO: 3.94 10*6/MM3 (ref 4.14–5.8)
RBC # BLD AUTO: 4.48 10*6/MM3 (ref 4.14–5.8)
RBC # BLD AUTO: 4.92 10*6/MM3 (ref 4.14–5.8)
RBC # UR STRIP: ABNORMAL /HPF
REF LAB TEST METHOD: ABNORMAL
SODIUM SERPL-SCNC: 142 MMOL/L (ref 136–145)
SODIUM SERPL-SCNC: 145 MMOL/L (ref 136–145)
SODIUM SERPL-SCNC: 149 MMOL/L (ref 136–145)
SODIUM SERPL-SCNC: 151 MMOL/L (ref 136–145)
SP GR UR STRIP: 1.01 (ref 1–1.03)
SQUAMOUS #/AREA URNS HPF: ABNORMAL /HPF
UROBILINOGEN UR QL STRIP: ABNORMAL
WBC # UR STRIP: ABNORMAL /HPF
WBC NRBC COR # BLD AUTO: 10.34 10*3/MM3 (ref 3.4–10.8)
WBC NRBC COR # BLD AUTO: 11.94 10*3/MM3 (ref 3.4–10.8)
WBC NRBC COR # BLD AUTO: 15.11 10*3/MM3 (ref 3.4–10.8)
WBC NRBC COR # BLD AUTO: 17.27 10*3/MM3 (ref 3.4–10.8)
WHOLE BLOOD HOLD COAG: NORMAL
WHOLE BLOOD HOLD SPECIMEN: NORMAL

## 2024-01-01 PROCEDURE — 25010000002 PIPERACILLIN SOD-TAZOBACTAM PER 1 G: Performed by: PHYSICIAN ASSISTANT

## 2024-01-01 PROCEDURE — 63710000001 INSULIN LISPRO (HUMAN) PER 5 UNITS: Performed by: HOSPITALIST

## 2024-01-01 PROCEDURE — 0 DEXTROSE 5 % SOLUTION: Performed by: HOSPITALIST

## 2024-01-01 PROCEDURE — 25010000002 MORPHINE PER 10 MG: Performed by: HOSPITALIST

## 2024-01-01 PROCEDURE — 99497 ADVNCD CARE PLAN 30 MIN: CPT | Performed by: NURSE PRACTITIONER

## 2024-01-01 PROCEDURE — 25810000003 SODIUM CHLORIDE 0.9 % SOLUTION 250 ML FLEX CONT: Performed by: INTERNAL MEDICINE

## 2024-01-01 PROCEDURE — 25010000002 PIPERACILLIN SOD-TAZOBACTAM PER 1 G

## 2024-01-01 PROCEDURE — 25010000002 LORAZEPAM PER 2 MG: Performed by: HOSPITALIST

## 2024-01-01 PROCEDURE — 80053 COMPREHEN METABOLIC PANEL: CPT

## 2024-01-01 PROCEDURE — 74176 CT ABD & PELVIS W/O CONTRAST: CPT

## 2024-01-01 PROCEDURE — P9612 CATHETERIZE FOR URINE SPEC: HCPCS

## 2024-01-01 PROCEDURE — 70450 CT HEAD/BRAIN W/O DYE: CPT

## 2024-01-01 PROCEDURE — 87040 BLOOD CULTURE FOR BACTERIA: CPT | Performed by: PHYSICIAN ASSISTANT

## 2024-01-01 PROCEDURE — 80048 BASIC METABOLIC PNL TOTAL CA: CPT

## 2024-01-01 PROCEDURE — 83605 ASSAY OF LACTIC ACID: CPT | Performed by: STUDENT IN AN ORGANIZED HEALTH CARE EDUCATION/TRAINING PROGRAM

## 2024-01-01 PROCEDURE — 83690 ASSAY OF LIPASE: CPT

## 2024-01-01 PROCEDURE — G0378 HOSPITAL OBSERVATION PER HR: HCPCS

## 2024-01-01 PROCEDURE — 80053 COMPREHEN METABOLIC PANEL: CPT | Performed by: INTERNAL MEDICINE

## 2024-01-01 PROCEDURE — 99233 SBSQ HOSP IP/OBS HIGH 50: CPT | Performed by: INTERNAL MEDICINE

## 2024-01-01 PROCEDURE — 85027 COMPLETE CBC AUTOMATED: CPT | Performed by: HOSPITALIST

## 2024-01-01 PROCEDURE — 25010000002 PAMIDRONATE DISODIUM PER 30 MG: Performed by: INTERNAL MEDICINE

## 2024-01-01 PROCEDURE — 36415 COLL VENOUS BLD VENIPUNCTURE: CPT

## 2024-01-01 PROCEDURE — 99223 1ST HOSP IP/OBS HIGH 75: CPT | Performed by: INTERNAL MEDICINE

## 2024-01-01 PROCEDURE — 99222 1ST HOSP IP/OBS MODERATE 55: CPT | Performed by: NURSE PRACTITIONER

## 2024-01-01 PROCEDURE — 25810000003 SODIUM CHLORIDE 0.9 % SOLUTION: Performed by: STUDENT IN AN ORGANIZED HEALTH CARE EDUCATION/TRAINING PROGRAM

## 2024-01-01 PROCEDURE — 81001 URINALYSIS AUTO W/SCOPE: CPT | Performed by: STUDENT IN AN ORGANIZED HEALTH CARE EDUCATION/TRAINING PROGRAM

## 2024-01-01 PROCEDURE — 82948 REAGENT STRIP/BLOOD GLUCOSE: CPT

## 2024-01-01 PROCEDURE — 82947 ASSAY GLUCOSE BLOOD QUANT: CPT

## 2024-01-01 PROCEDURE — 85027 COMPLETE CBC AUTOMATED: CPT

## 2024-01-01 PROCEDURE — 85025 COMPLETE CBC W/AUTO DIFF WBC: CPT | Performed by: STUDENT IN AN ORGANIZED HEALTH CARE EDUCATION/TRAINING PROGRAM

## 2024-01-01 PROCEDURE — 25810000003 SODIUM CHLORIDE 0.9 % SOLUTION

## 2024-01-01 PROCEDURE — 83605 ASSAY OF LACTIC ACID: CPT | Performed by: PHYSICIAN ASSISTANT

## 2024-01-01 PROCEDURE — 99285 EMERGENCY DEPT VISIT HI MDM: CPT

## 2024-01-01 PROCEDURE — 87102 FUNGUS ISOLATION CULTURE: CPT | Performed by: HOSPITALIST

## 2024-01-01 RX ORDER — LORAZEPAM 2 MG/ML
2 CONCENTRATE ORAL
Status: DISCONTINUED | OUTPATIENT
Start: 2024-01-01 | End: 2024-01-01 | Stop reason: HOSPADM

## 2024-01-01 RX ORDER — MORPHINE SULFATE 4 MG/ML
4 INJECTION, SOLUTION INTRAMUSCULAR; INTRAVENOUS
Status: DISCONTINUED | OUTPATIENT
Start: 2024-01-01 | End: 2024-01-01 | Stop reason: HOSPADM

## 2024-01-01 RX ORDER — IBUPROFEN 600 MG/1
1 TABLET ORAL
Status: DISCONTINUED | OUTPATIENT
Start: 2024-01-01 | End: 2024-01-01

## 2024-01-01 RX ORDER — SCOLOPAMINE TRANSDERMAL SYSTEM 1 MG/1
1 PATCH, EXTENDED RELEASE TRANSDERMAL
Status: DISCONTINUED | OUTPATIENT
Start: 2024-01-01 | End: 2024-01-01 | Stop reason: HOSPADM

## 2024-01-01 RX ORDER — GLYCOPYRROLATE 0.2 MG/ML
0.4 INJECTION INTRAMUSCULAR; INTRAVENOUS
Status: DISCONTINUED | OUTPATIENT
Start: 2024-01-01 | End: 2024-01-01 | Stop reason: HOSPADM

## 2024-01-01 RX ORDER — ACETAMINOPHEN 160 MG/5ML
650 SOLUTION ORAL EVERY 4 HOURS PRN
Status: DISCONTINUED | OUTPATIENT
Start: 2024-01-01 | End: 2024-01-01 | Stop reason: HOSPADM

## 2024-01-01 RX ORDER — LORAZEPAM 2 MG/ML
0.5 INJECTION INTRAMUSCULAR
Status: DISCONTINUED | OUTPATIENT
Start: 2024-01-01 | End: 2024-01-01 | Stop reason: HOSPADM

## 2024-01-01 RX ORDER — NITROGLYCERIN 0.4 MG/1
0.4 TABLET SUBLINGUAL
Status: DISCONTINUED | OUTPATIENT
Start: 2024-01-01 | End: 2024-01-01

## 2024-01-01 RX ORDER — SODIUM CHLORIDE 0.9 % (FLUSH) 0.9 %
10 SYRINGE (ML) INJECTION AS NEEDED
Status: DISCONTINUED | OUTPATIENT
Start: 2024-01-01 | End: 2024-01-01 | Stop reason: HOSPADM

## 2024-01-01 RX ORDER — PANTOPRAZOLE SODIUM 40 MG/1
40 TABLET, DELAYED RELEASE ORAL DAILY
Status: DISCONTINUED | OUTPATIENT
Start: 2024-01-01 | End: 2024-01-01

## 2024-01-01 RX ORDER — MIRTAZAPINE 15 MG/1
15 TABLET, FILM COATED ORAL DAILY
COMMUNITY

## 2024-01-01 RX ORDER — ONDANSETRON 4 MG/1
4 TABLET, ORALLY DISINTEGRATING ORAL EVERY 6 HOURS PRN
Status: DISCONTINUED | OUTPATIENT
Start: 2024-01-01 | End: 2024-01-01 | Stop reason: HOSPADM

## 2024-01-01 RX ORDER — INSULIN LISPRO 100 [IU]/ML
2-9 INJECTION, SOLUTION INTRAVENOUS; SUBCUTANEOUS
Status: DISCONTINUED | OUTPATIENT
Start: 2024-01-01 | End: 2024-01-01

## 2024-01-01 RX ORDER — BISACODYL 5 MG/1
5 TABLET, DELAYED RELEASE ORAL DAILY PRN
Status: DISCONTINUED | OUTPATIENT
Start: 2024-01-01 | End: 2024-01-01 | Stop reason: HOSPADM

## 2024-01-01 RX ORDER — DEXTROSE MONOHYDRATE 25 G/50ML
25 INJECTION, SOLUTION INTRAVENOUS
Status: DISCONTINUED | OUTPATIENT
Start: 2024-01-01 | End: 2024-01-01

## 2024-01-01 RX ORDER — MORPHINE SULFATE 20 MG/ML
20 SOLUTION ORAL
Status: DISCONTINUED | OUTPATIENT
Start: 2024-01-01 | End: 2024-01-01 | Stop reason: HOSPADM

## 2024-01-01 RX ORDER — DIPHENOXYLATE HCL/ATROPINE 2.5-.025MG
1 TABLET ORAL
Status: DISCONTINUED | OUTPATIENT
Start: 2024-01-01 | End: 2024-01-01 | Stop reason: HOSPADM

## 2024-01-01 RX ORDER — LEVOTHYROXINE SODIUM 50 UG/1
50 TABLET ORAL
Status: DISCONTINUED | OUTPATIENT
Start: 2024-01-01 | End: 2024-01-01

## 2024-01-01 RX ORDER — SODIUM CHLORIDE 9 MG/ML
100 INJECTION, SOLUTION INTRAVENOUS CONTINUOUS
Status: DISCONTINUED | OUTPATIENT
Start: 2024-01-01 | End: 2024-01-01

## 2024-01-01 RX ORDER — AMOXICILLIN 250 MG
2 CAPSULE ORAL 2 TIMES DAILY PRN
Status: DISCONTINUED | OUTPATIENT
Start: 2024-01-01 | End: 2024-01-01 | Stop reason: HOSPADM

## 2024-01-01 RX ORDER — MORPHINE SULFATE 20 MG/ML
5 SOLUTION ORAL
Status: DISCONTINUED | OUTPATIENT
Start: 2024-01-01 | End: 2024-01-01 | Stop reason: HOSPADM

## 2024-01-01 RX ORDER — ACETAMINOPHEN 325 MG/1
650 TABLET ORAL EVERY 4 HOURS PRN
Status: DISCONTINUED | OUTPATIENT
Start: 2024-01-01 | End: 2024-01-01 | Stop reason: HOSPADM

## 2024-01-01 RX ORDER — ONDANSETRON 2 MG/ML
4 INJECTION INTRAMUSCULAR; INTRAVENOUS EVERY 6 HOURS PRN
Status: DISCONTINUED | OUTPATIENT
Start: 2024-01-01 | End: 2024-01-01 | Stop reason: HOSPADM

## 2024-01-01 RX ORDER — GLYCOPYRROLATE 0.2 MG/ML
0.2 INJECTION INTRAMUSCULAR; INTRAVENOUS
Status: DISCONTINUED | OUTPATIENT
Start: 2024-01-01 | End: 2024-01-01 | Stop reason: HOSPADM

## 2024-01-01 RX ORDER — LORAZEPAM 2 MG/ML
2 INJECTION INTRAMUSCULAR
Status: DISCONTINUED | OUTPATIENT
Start: 2024-01-01 | End: 2024-01-01 | Stop reason: HOSPADM

## 2024-01-01 RX ORDER — MORPHINE SULFATE 20 MG/ML
10 SOLUTION ORAL
Status: DISCONTINUED | OUTPATIENT
Start: 2024-01-01 | End: 2024-01-01 | Stop reason: HOSPADM

## 2024-01-01 RX ORDER — PAMIDRONATE DISODIUM 9 MG/ML
60 INJECTION, POWDER, LYOPHILIZED, FOR SOLUTION INTRAVENOUS ONCE
Status: DISCONTINUED | OUTPATIENT
Start: 2024-01-01 | End: 2024-01-01 | Stop reason: ALTCHOICE

## 2024-01-01 RX ORDER — HYDROMORPHONE HYDROCHLORIDE 1 MG/ML
0.5 INJECTION, SOLUTION INTRAMUSCULAR; INTRAVENOUS; SUBCUTANEOUS
Status: DISCONTINUED | OUTPATIENT
Start: 2024-01-01 | End: 2024-01-01 | Stop reason: HOSPADM

## 2024-01-01 RX ORDER — KETOROLAC TROMETHAMINE 15 MG/ML
15 INJECTION, SOLUTION INTRAMUSCULAR; INTRAVENOUS EVERY 6 HOURS PRN
Status: DISCONTINUED | OUTPATIENT
Start: 2024-01-01 | End: 2024-01-01 | Stop reason: HOSPADM

## 2024-01-01 RX ORDER — INSULIN DEGLUDEC 100 U/ML
26 INJECTION, SOLUTION SUBCUTANEOUS DAILY
COMMUNITY

## 2024-01-01 RX ORDER — INSULIN LISPRO 100 [IU]/ML
5 INJECTION, SOLUTION INTRAVENOUS; SUBCUTANEOUS
COMMUNITY

## 2024-01-01 RX ORDER — DEXTROSE MONOHYDRATE 50 MG/ML
75 INJECTION, SOLUTION INTRAVENOUS CONTINUOUS
Status: DISCONTINUED | OUTPATIENT
Start: 2024-01-01 | End: 2024-01-01

## 2024-01-01 RX ORDER — OLANZAPINE 10 MG/2ML
5 INJECTION, POWDER, FOR SOLUTION INTRAMUSCULAR EVERY 8 HOURS PRN
Status: DISCONTINUED | OUTPATIENT
Start: 2024-01-01 | End: 2024-01-01 | Stop reason: HOSPADM

## 2024-01-01 RX ORDER — MORPHINE SULFATE 2 MG/ML
2 INJECTION, SOLUTION INTRAMUSCULAR; INTRAVENOUS
Status: DISCONTINUED | OUTPATIENT
Start: 2024-01-01 | End: 2024-01-01 | Stop reason: HOSPADM

## 2024-01-01 RX ORDER — ACETAMINOPHEN 650 MG/1
650 SUPPOSITORY RECTAL EVERY 4 HOURS PRN
Status: DISCONTINUED | OUTPATIENT
Start: 2024-01-01 | End: 2024-01-01 | Stop reason: HOSPADM

## 2024-01-01 RX ORDER — LORAZEPAM 2 MG/ML
1 INJECTION INTRAMUSCULAR
Status: DISCONTINUED | OUTPATIENT
Start: 2024-01-01 | End: 2024-01-01 | Stop reason: HOSPADM

## 2024-01-01 RX ORDER — BISACODYL 10 MG
10 SUPPOSITORY, RECTAL RECTAL DAILY PRN
Status: DISCONTINUED | OUTPATIENT
Start: 2024-01-01 | End: 2024-01-01 | Stop reason: HOSPADM

## 2024-01-01 RX ORDER — LORAZEPAM 2 MG/ML
0.5 CONCENTRATE ORAL
Status: DISCONTINUED | OUTPATIENT
Start: 2024-01-01 | End: 2024-01-01 | Stop reason: HOSPADM

## 2024-01-01 RX ORDER — NICOTINE POLACRILEX 4 MG
15 LOZENGE BUCCAL
Status: DISCONTINUED | OUTPATIENT
Start: 2024-01-01 | End: 2024-01-01

## 2024-01-01 RX ORDER — SODIUM CHLORIDE 450 MG/100ML
125 INJECTION, SOLUTION INTRAVENOUS CONTINUOUS
Status: DISCONTINUED | OUTPATIENT
Start: 2024-01-01 | End: 2024-01-01

## 2024-01-01 RX ORDER — LORAZEPAM 2 MG/ML
1 CONCENTRATE ORAL
Status: DISCONTINUED | OUTPATIENT
Start: 2024-01-01 | End: 2024-01-01 | Stop reason: HOSPADM

## 2024-01-01 RX ORDER — POLYETHYLENE GLYCOL 3350 17 G/17G
17 POWDER, FOR SOLUTION ORAL DAILY PRN
Status: DISCONTINUED | OUTPATIENT
Start: 2024-01-01 | End: 2024-01-01 | Stop reason: HOSPADM

## 2024-01-01 RX ORDER — MORPHINE SULFATE 10 MG/ML
6 INJECTION INTRAMUSCULAR; INTRAVENOUS; SUBCUTANEOUS
Status: DISCONTINUED | OUTPATIENT
Start: 2024-01-01 | End: 2024-01-01 | Stop reason: HOSPADM

## 2024-01-01 RX ADMIN — PANTOPRAZOLE SODIUM 40 MG: 40 TABLET, DELAYED RELEASE ORAL at 11:26

## 2024-01-01 RX ADMIN — DEXTROSE 15 G: 15 GEL ORAL at 22:10

## 2024-01-01 RX ADMIN — PIPERACILLIN AND TAZOBACTAM 3.38 G: 3; .375 INJECTION, POWDER, FOR SOLUTION INTRAVENOUS at 05:21

## 2024-01-01 RX ADMIN — MORPHINE SULFATE 2 MG: 2 INJECTION, SOLUTION INTRAMUSCULAR; INTRAVENOUS at 14:08

## 2024-01-01 RX ADMIN — PIPERACILLIN AND TAZOBACTAM 3.38 G: 3; .375 INJECTION, POWDER, FOR SOLUTION INTRAVENOUS at 05:08

## 2024-01-01 RX ADMIN — LORAZEPAM 0.5 MG: 2 INJECTION INTRAMUSCULAR; INTRAVENOUS at 16:56

## 2024-01-01 RX ADMIN — PIPERACILLIN AND TAZOBACTAM 3.38 G: 3; .375 INJECTION, POWDER, FOR SOLUTION INTRAVENOUS at 19:11

## 2024-01-01 RX ADMIN — MORPHINE SULFATE 2 MG: 2 INJECTION, SOLUTION INTRAMUSCULAR; INTRAVENOUS at 20:59

## 2024-01-01 RX ADMIN — SODIUM CHLORIDE 100 ML/HR: 9 INJECTION, SOLUTION INTRAVENOUS at 13:25

## 2024-01-01 RX ADMIN — MORPHINE SULFATE 2 MG: 2 INJECTION, SOLUTION INTRAMUSCULAR; INTRAVENOUS at 16:56

## 2024-01-01 RX ADMIN — MORPHINE SULFATE 2 MG: 2 INJECTION, SOLUTION INTRAMUSCULAR; INTRAVENOUS at 17:41

## 2024-01-01 RX ADMIN — PIPERACILLIN AND TAZOBACTAM 3.38 G: 3; .375 INJECTION, POWDER, LYOPHILIZED, FOR SOLUTION INTRAVENOUS at 22:35

## 2024-01-01 RX ADMIN — LORAZEPAM 1 MG: 2 INJECTION INTRAMUSCULAR; INTRAVENOUS at 19:46

## 2024-01-01 RX ADMIN — SODIUM CHLORIDE 1000 ML: 9 INJECTION, SOLUTION INTRAVENOUS at 19:41

## 2024-01-01 RX ADMIN — LORAZEPAM 1 MG: 2 INJECTION INTRAMUSCULAR; INTRAVENOUS at 20:59

## 2024-01-01 RX ADMIN — PIPERACILLIN AND TAZOBACTAM 3.38 G: 3; .375 INJECTION, POWDER, FOR SOLUTION INTRAVENOUS at 14:11

## 2024-01-01 RX ADMIN — LORAZEPAM 1 MG: 2 INJECTION INTRAMUSCULAR; INTRAVENOUS at 02:51

## 2024-01-01 RX ADMIN — MORPHINE SULFATE 2 MG: 2 INJECTION, SOLUTION INTRAMUSCULAR; INTRAVENOUS at 21:53

## 2024-01-01 RX ADMIN — PAMIDRONATE DISODIUM 60 MG: 9 INJECTION INTRAVENOUS at 23:09

## 2024-01-01 RX ADMIN — MORPHINE SULFATE 2 MG: 2 INJECTION, SOLUTION INTRAMUSCULAR; INTRAVENOUS at 08:47

## 2024-01-01 RX ADMIN — LORAZEPAM 1 MG: 2 INJECTION INTRAMUSCULAR; INTRAVENOUS at 18:06

## 2024-01-01 RX ADMIN — MORPHINE SULFATE 2 MG: 2 INJECTION, SOLUTION INTRAMUSCULAR; INTRAVENOUS at 18:07

## 2024-01-01 RX ADMIN — MORPHINE SULFATE 2 MG: 2 INJECTION, SOLUTION INTRAMUSCULAR; INTRAVENOUS at 02:51

## 2024-01-01 RX ADMIN — SODIUM CHLORIDE 100 ML/HR: 9 INJECTION, SOLUTION INTRAVENOUS at 00:30

## 2024-01-01 RX ADMIN — DEXTROSE MONOHYDRATE 75 ML/HR: 50 INJECTION, SOLUTION INTRAVENOUS at 12:48

## 2024-01-01 RX ADMIN — SODIUM CHLORIDE 100 ML/HR: 9 INJECTION, SOLUTION INTRAVENOUS at 03:08

## 2024-01-01 RX ADMIN — LORAZEPAM 1 MG: 2 INJECTION INTRAMUSCULAR; INTRAVENOUS at 07:14

## 2024-01-01 RX ADMIN — MORPHINE SULFATE 2 MG: 2 INJECTION, SOLUTION INTRAMUSCULAR; INTRAVENOUS at 11:26

## 2024-01-01 RX ADMIN — PIPERACILLIN AND TAZOBACTAM 3.38 G: 3; .375 INJECTION, POWDER, FOR SOLUTION INTRAVENOUS at 14:20

## 2024-01-01 RX ADMIN — DEXTROSE MONOHYDRATE 75 ML/HR: 50 INJECTION, SOLUTION INTRAVENOUS at 22:53

## 2024-01-01 RX ADMIN — LORAZEPAM 0.5 MG: 2 INJECTION INTRAMUSCULAR; INTRAVENOUS at 21:53

## 2024-01-01 RX ADMIN — LORAZEPAM 1 MG: 2 INJECTION INTRAMUSCULAR; INTRAVENOUS at 14:08

## 2024-01-01 RX ADMIN — SODIUM CHLORIDE 125 ML/HR: 4.5 INJECTION, SOLUTION INTRAVENOUS at 00:45

## 2024-01-01 RX ADMIN — LORAZEPAM 2 MG: 2 INJECTION INTRAMUSCULAR; INTRAVENOUS at 11:27

## 2024-01-01 RX ADMIN — Medication 10 ML: at 11:27

## 2024-01-01 RX ADMIN — MORPHINE SULFATE 2 MG: 2 INJECTION, SOLUTION INTRAMUSCULAR; INTRAVENOUS at 08:25

## 2024-01-01 RX ADMIN — INSULIN LISPRO 4 UNITS: 100 INJECTION, SOLUTION INTRAVENOUS; SUBCUTANEOUS at 17:53

## 2024-01-01 RX ADMIN — PIPERACILLIN AND TAZOBACTAM 3.38 G: 3; .375 INJECTION, POWDER, FOR SOLUTION INTRAVENOUS at 22:56

## 2024-01-01 RX ADMIN — LEVOTHYROXINE SODIUM 50 MCG: 50 TABLET ORAL at 05:21

## 2024-01-01 RX ADMIN — LORAZEPAM 1 MG: 2 INJECTION INTRAMUSCULAR; INTRAVENOUS at 08:47

## 2024-01-01 RX ADMIN — SODIUM CHLORIDE 125 ML/HR: 4.5 INJECTION, SOLUTION INTRAVENOUS at 19:10

## 2024-01-01 RX ADMIN — LEVOTHYROXINE SODIUM 50 MCG: 50 TABLET ORAL at 05:08

## 2024-01-01 RX ADMIN — PIPERACILLIN AND TAZOBACTAM 3.38 G: 3; .375 INJECTION, POWDER, FOR SOLUTION INTRAVENOUS at 17:19

## 2024-01-01 RX ADMIN — PIPERACILLIN AND TAZOBACTAM 3.38 G: 3; .375 INJECTION, POWDER, FOR SOLUTION INTRAVENOUS at 05:14

## 2024-01-01 RX ADMIN — PANTOPRAZOLE SODIUM 40 MG: 40 TABLET, DELAYED RELEASE ORAL at 17:54

## 2024-01-01 RX ADMIN — INSULIN LISPRO 2 UNITS: 100 INJECTION, SOLUTION INTRAVENOUS; SUBCUTANEOUS at 13:05

## 2024-01-01 RX ADMIN — PIPERACILLIN AND TAZOBACTAM 3.38 G: 3; .375 INJECTION, POWDER, FOR SOLUTION INTRAVENOUS at 23:23

## 2024-01-02 DIAGNOSIS — E03.9 ACQUIRED HYPOTHYROIDISM: ICD-10-CM

## 2024-01-02 RX ORDER — LEVOTHYROXINE SODIUM 0.05 MG/1
50 TABLET ORAL DAILY
Qty: 90 TABLET | Refills: 0 | Status: SHIPPED | OUTPATIENT
Start: 2024-01-02

## 2024-01-16 ENCOUNTER — SPECIALTY PHARMACY (OUTPATIENT)
Dept: ENDOCRINOLOGY | Age: 84
End: 2024-01-16
Payer: COMMERCIAL

## 2024-02-05 ENCOUNTER — OFFICE VISIT (OUTPATIENT)
Dept: INTERNAL MEDICINE | Facility: CLINIC | Age: 84
End: 2024-02-05
Payer: MEDICARE

## 2024-02-05 VITALS
HEART RATE: 87 BPM | OXYGEN SATURATION: 98 % | HEIGHT: 70 IN | SYSTOLIC BLOOD PRESSURE: 130 MMHG | WEIGHT: 172 LBS | DIASTOLIC BLOOD PRESSURE: 84 MMHG | BODY MASS INDEX: 24.62 KG/M2

## 2024-02-05 DIAGNOSIS — R05.3 CHRONIC COUGH: ICD-10-CM

## 2024-02-05 DIAGNOSIS — N39.0 URINARY TRACT INFECTION WITH HEMATURIA, SITE UNSPECIFIED: ICD-10-CM

## 2024-02-05 DIAGNOSIS — R31.9 URINARY TRACT INFECTION WITH HEMATURIA, SITE UNSPECIFIED: ICD-10-CM

## 2024-02-05 DIAGNOSIS — R31.9 HEMATURIA, UNSPECIFIED TYPE: Primary | ICD-10-CM

## 2024-02-05 PROCEDURE — 99213 OFFICE O/P EST LOW 20 MIN: CPT

## 2024-02-05 PROCEDURE — 1160F RVW MEDS BY RX/DR IN RCRD: CPT

## 2024-02-05 PROCEDURE — 1159F MED LIST DOCD IN RCRD: CPT

## 2024-02-05 RX ORDER — SULFAMETHOXAZOLE AND TRIMETHOPRIM 800; 160 MG/1; MG/1
1 TABLET ORAL 2 TIMES DAILY
Qty: 14 TABLET | Refills: 0 | Status: SHIPPED | OUTPATIENT
Start: 2024-02-05 | End: 2024-02-12

## 2024-02-05 NOTE — PROGRESS NOTES
"Chief Complaint  Blood in Urine    Subjective        Bennie Greene presents to [facility] PRIMARY [facility]  History of Present Illness  83-year-old male presenting with hematuria and cough.  Patient Dr. Shah.  Has noticed occasional blood in his urine over the past months.  Has occasional feeling that he needs to go but then he has to force himself to go.  Has had recent burning sensation with urination.  Treating with AZO for the past month.  Has occasional abdominal pain to the right of his umbilicus.  Denies back pain or flank pain.  Denies fever.  Denies changes in activity or diet.  Blood in Urine        Objective   Vital Signs:  /84 (BP Location: Right arm, Patient Position: Sitting, Cuff Size: Adult)   Pulse 87   Ht 177.8 cm (70\")   Wt 78 kg (172 lb)   SpO2 98%   BMI 24.68 kg/m²   Estimated body mass index is 24.68 kg/m² as calculated from the following:    Height as of this encounter: 177.8 cm (70\").    Weight as of this encounter: 78 kg (172 lb).       BMI is within normal parameters. No other follow-up for BMI required.      Physical Exam  Vitals reviewed.   Constitutional:       Appearance: Normal appearance.   Musculoskeletal:         General: Normal range of motion.      Cervical back: Normal range of motion.   Skin:     General: Skin is warm and dry.      Capillary Refill: Capillary refill takes less than 2 seconds.   Neurological:      General: No focal deficit present.      Mental Status: He is alert and oriented to person, place, and time.   Psychiatric:         Mood and Affect: Mood normal.         Behavior: Behavior normal.         Thought Content: Thought content normal.         Judgment: Judgment normal.        Result Review :      Common labs          5/12/2023    10:31 11/6/2023    11:36 12/12/2023    08:04   Common Labs   Glucose  289  189    BUN  19  19    Creatinine  1.00  1.07    Sodium  139  144    Potassium  4.2  4.4    Chloride  101  108    Calcium  9.2  9.2  "   Hemoglobin A1C  10.90     Microalbumin, Urine 71.3  136.9     PSA 0.094            Current Outpatient Medications on File Prior to Visit   Medication Sig Dispense Refill    BD Pen Needle Nola 2nd Gen 32G X 4 MM misc USE WITH INSULIN EVERY  each 3    Blood Glucose Monitoring Suppl (Blood Glucose Monitor System) w/Device kit Use to test 2 (Two) Times a Day. 1 each 0    Dulaglutide (Trulicity) 1.5 MG/0.5ML solution pen-injector Inject 1.5 mg under the skin into the appropriate area as directed 1 (One) Time Per Week. Indications: Type 2 Diabetes (Patient taking differently: Inject 1.5 mg under the skin into the appropriate area as directed 1 (One) Time Per Week.) 6 mL 0    Dulaglutide (Trulicity) 3 MG/0.5ML solution pen-injector Inject 0.5 mL under the skin into the appropriate area as directed 1 (One) Time Per Week for 90 days. 2 mL 2    glucose blood test strip Use one test strip 2 (Two) Times a Day. Use as instructed 200 each 11    Lancets misc Use 1 Daily to check blood sugar 100 each 1    levothyroxine (SYNTHROID, LEVOTHROID) 50 MCG tablet TAKE 1 TABLET BY MOUTH EVERY DAY 90 tablet 0    lisinopril (PRINIVIL,ZESTRIL) 2.5 MG tablet Take 1 tablet by mouth Daily. 90 tablet 3    mupirocin (BACTROBAN) 2 % ointment Apply 1 Application topically to the appropriate area as directed 3 (Three) Times a Day.      pantoprazole (PROTONIX) 40 MG EC tablet Take 1 tablet by mouth Daily. 90 tablet 3    Probiotic Product (SMRxT) capsule Take 1 tablet by mouth Daily. 30 capsule 5    Tresiba FlexTouch 200 UNIT/ML solution pen-injector pen injection Inject 38 Units under the skin into the appropriate area as directed Daily. (Patient taking differently: Inject 38 Units under the skin into the appropriate area as directed Daily. Pt's wife reports current dose is 40 units daily as of 11/3) 18 mL 0     No current facility-administered medications on file prior to visit.              Assessment and Plan     Diagnoses  and all orders for this visit:    1. Hematuria, unspecified type (Primary)  -     Urinalysis With Microscopic If Indicated (No Culture) - Urine, Clean Catch  -     Urine Culture - Urine, Urine, Clean Catch  -     sulfamethoxazole-trimethoprim (Bactrim DS) 800-160 MG per tablet; Take 1 tablet by mouth 2 (Two) Times a Day for 7 days.  Dispense: 14 tablet; Refill: 0    2. Urinary tract infection with hematuria, site unspecified  -     sulfamethoxazole-trimethoprim (Bactrim DS) 800-160 MG per tablet; Take 1 tablet by mouth 2 (Two) Times a Day for 7 days.  Dispense: 14 tablet; Refill: 0    3. Chronic cough      Patient Instructions   Take antibiotic as prescribed. Stay hydrated with water. May continue AZO if desired. Recommend Flonase or saline nasal spray for allergies. If urine culture results determine different antibiotic is necessary, new prescription will be sent. Follow-up as needed.         Follow Up     Return in about 3 months (around 5/5/2024), or if symptoms worsen or fail to improve, for Medicare Wellness.  Patient was given instructions and counseling regarding his condition or for health maintenance advice. Please see specific information pulled into the AVS if appropriate.

## 2024-02-05 NOTE — PATIENT INSTRUCTIONS
Take antibiotic as prescribed. Stay hydrated with water. May continue AZO if desired. Recommend Flonase or saline nasal spray for allergies. If urine culture results determine different antibiotic is necessary, new prescription will be sent. Follow-up as needed.

## 2024-02-07 ENCOUNTER — SPECIALTY PHARMACY (OUTPATIENT)
Dept: ENDOCRINOLOGY | Age: 84
End: 2024-02-07
Payer: COMMERCIAL

## 2024-02-07 DIAGNOSIS — R31.9 HEMATURIA, UNSPECIFIED TYPE: Primary | ICD-10-CM

## 2024-02-07 LAB
APPEARANCE UR: CLEAR
BACTERIA #/AREA URNS HPF: ABNORMAL /HPF
BACTERIA UR CULT: NO GROWTH
BACTERIA UR CULT: NORMAL
BILIRUB UR QL STRIP: NEGATIVE
CASTS URNS MICRO: ABNORMAL
COLOR UR: YELLOW
EPI CELLS #/AREA URNS HPF: ABNORMAL /HPF
GLUCOSE UR QL STRIP: ABNORMAL
HGB UR QL STRIP: ABNORMAL
KETONES UR QL STRIP: NEGATIVE
LEUKOCYTE ESTERASE UR QL STRIP: NEGATIVE
NITRITE UR QL STRIP: NEGATIVE
PH UR STRIP: 6 [PH] (ref 5–8)
PROT UR QL STRIP: ABNORMAL
RBC #/AREA URNS HPF: ABNORMAL /HPF
SP GR UR STRIP: 1.02 (ref 1–1.03)
UROBILINOGEN UR STRIP-MCNC: ABNORMAL MG/DL
WBC #/AREA URNS HPF: ABNORMAL /HPF

## 2024-02-07 NOTE — PROGRESS NOTES
Specialty Pharmacy Refill Coordination Note     Bennie is a 83 y.o. male contacted today regarding refills of  2 specialty medication(s).    Reviewed and verified with patient:       Specialty medication(s) and dose(s) confirmed: yes    Refill Questions      Flowsheet Row Most Recent Value   Changes to allergies? No   Changes to medications? No   New conditions or infections since last clinic visit No   Unplanned office visit, urgent care, ED, or hospital admission in the last 4 weeks  No   How does patient/caregiver feel medication is working? Good   Financial problems or insurance changes  No   Since the previous refill, were any specialty medication doses or scheduled injections missed or delayed?  No   Does this patient require a clinical escalation to a pharmacist? No            Delivery Questions      Flowsheet Row Most Recent Value   Delivery method Beeline   Delivery address verified with patient/caregiver? Yes   Delivery address Home   Number of medications in delivery 2   Medication(s) being filled and delivered Lisinopril (Ace Inhibitors), Insulin Degludec   Doses left of specialty medications 1 week   Copay verified? Yes   Copay amount 35.02   Copay form of payment Credit/debit on file                   Follow-up: 25 day(s)     Shelby Alvarez, Pharmacy Technician  Specialty Pharmacy Technician

## 2024-02-19 ENCOUNTER — HOSPITAL ENCOUNTER (OUTPATIENT)
Facility: HOSPITAL | Age: 84
Discharge: HOME | End: 2024-02-19
Payer: MEDICARE

## 2024-02-19 DIAGNOSIS — R31.9 HEMATURIA, UNSPECIFIED TYPE: ICD-10-CM

## 2024-02-19 PROCEDURE — 76700 US EXAM ABDOM COMPLETE: CPT

## 2024-02-20 NOTE — PROGRESS NOTES
Ultrasound of abdomen is normal.  No root cause for hematuria or abdominal pain determined in image.

## 2024-02-21 ENCOUNTER — TELEPHONE (OUTPATIENT)
Dept: INTERNAL MEDICINE | Facility: CLINIC | Age: 84
End: 2024-02-21
Payer: COMMERCIAL

## 2024-02-21 DIAGNOSIS — R31.9 HEMATURIA, UNSPECIFIED TYPE: Primary | ICD-10-CM

## 2024-02-21 NOTE — TELEPHONE ENCOUNTER
Patient is still having blood in urine. They would like to know how to proceed with the issue. Please call them back. Thanks!

## 2024-03-01 DIAGNOSIS — E03.9 ACQUIRED HYPOTHYROIDISM: ICD-10-CM

## 2024-03-01 RX ORDER — LEVOTHYROXINE SODIUM 0.05 MG/1
50 TABLET ORAL DAILY
Qty: 90 TABLET | Refills: 0 | OUTPATIENT
Start: 2024-03-01

## 2024-03-01 NOTE — TELEPHONE ENCOUNTER
Rx Refill Note  Requested Prescriptions     Pending Prescriptions Disp Refills    levothyroxine (SYNTHROID, LEVOTHROID) 50 MCG tablet 90 tablet 0     Sig: TAKE 1 TABLET BY MOUTH EVERY DAY      Last office visit with prescribing clinician: 11/14/2023   Last telemedicine visit with prescribing clinician: Visit date not found   Next office visit with prescribing clinician: 3/14/2024                         Would you like a call back once the refill request has been completed: [] Yes [] No    If the office needs to give you a call back, can they leave a voicemail: [] Yes [] No    Katherine Powell  03/01/24, 15:27 EST

## 2024-03-14 ENCOUNTER — OFFICE VISIT (OUTPATIENT)
Dept: ENDOCRINOLOGY | Age: 84
End: 2024-03-14
Payer: MEDICARE

## 2024-03-14 VITALS
DIASTOLIC BLOOD PRESSURE: 100 MMHG | TEMPERATURE: 97 F | BODY MASS INDEX: 24.31 KG/M2 | SYSTOLIC BLOOD PRESSURE: 134 MMHG | WEIGHT: 169.8 LBS | OXYGEN SATURATION: 98 % | HEIGHT: 70 IN | HEART RATE: 68 BPM

## 2024-03-14 DIAGNOSIS — E78.5 HYPERLIPIDEMIA ASSOCIATED WITH TYPE 2 DIABETES MELLITUS: ICD-10-CM

## 2024-03-14 DIAGNOSIS — E11.69 HYPERLIPIDEMIA ASSOCIATED WITH TYPE 2 DIABETES MELLITUS: ICD-10-CM

## 2024-03-14 DIAGNOSIS — E03.9 ACQUIRED HYPOTHYROIDISM: ICD-10-CM

## 2024-03-14 DIAGNOSIS — Z79.4 TYPE 2 DIABETES MELLITUS WITH HYPERGLYCEMIA, WITH LONG-TERM CURRENT USE OF INSULIN: Primary | ICD-10-CM

## 2024-03-14 DIAGNOSIS — E11.65 TYPE 2 DIABETES MELLITUS WITH HYPERGLYCEMIA, WITH LONG-TERM CURRENT USE OF INSULIN: Primary | ICD-10-CM

## 2024-03-14 PROCEDURE — 99214 OFFICE O/P EST MOD 30 MIN: CPT | Performed by: NURSE PRACTITIONER

## 2024-03-14 NOTE — PROGRESS NOTES
"Chief Complaint  Diabetes (Checks bs once every 2 weeks.)    Subjective        Bennie Greene presents to [facility] END[facility]  History of Present Illness    Reports he stopped the trulicity but doesn't know when     Not check BS or BP    Type 2 dm, > 10 years   DM regimen: Tresiba 40 units in the am  Last eye exam- still never went   Lisinopril stopped 2/14/2024 r/t dry cough   BP at home \" are good\" but won't give specific numbers     Objective   Vital Signs:  /100   Pulse 68   Temp 97 °F (36.1 °C) (Temporal)   Ht 177.8 cm (70\")   Wt 77 kg (169 lb 12.8 oz)   SpO2 98%   BMI 24.36 kg/m²   Estimated body mass index is 24.36 kg/m² as calculated from the following:    Height as of this encounter: 177.8 cm (70\").    Weight as of this encounter: 77 kg (169 lb 12.8 oz).       BMI is within normal parameters. No other follow-up for BMI required.      Physical Exam  Vitals reviewed.   Constitutional:       General: He is not in acute distress.  HENT:      Head: Normocephalic and atraumatic.   Cardiovascular:      Rate and Rhythm: Normal rate.   Pulmonary:      Effort: Pulmonary effort is normal. No respiratory distress.   Musculoskeletal:         General: No signs of injury. Normal range of motion.      Cervical back: Normal range of motion and neck supple.   Skin:     General: Skin is warm and dry.   Neurological:      Mental Status: He is alert and oriented to person, place, and time. Mental status is at baseline.   Psychiatric:         Mood and Affect: Mood normal.         Behavior: Behavior normal.         Thought Content: Thought content normal.         Judgment: Judgment normal.          Result Review :    The following data was reviewed by: ALTAGRACIA Rogers on 03/14/2024:  Common labs          5/12/2023    10:31 11/6/2023    11:36 12/12/2023    08:04   Common Labs   Glucose  289  189    BUN  19  19    Creatinine  1.00  1.07    Sodium  139  144    Potassium  4.2  4.4    Chloride  " 101  108    Calcium  9.2  9.2    Hemoglobin A1C  10.90     Microalbumin, Urine 71.3  136.9     PSA 0.094                     Assessment and Plan     Diagnoses and all orders for this visit:    1. Type 2 diabetes mellitus with hyperglycemia, with long-term current use of insulin (Primary)  -     TSH  -     T4, Free  -     Hemoglobin A1c  -     Basic Metabolic Panel  -     Lipid Panel  -     Microalbumin / Creatinine Urine Ratio - Urine, Clean Catch  -     Fructosamine    2. Hyperlipidemia associated with type 2 diabetes mellitus  -     TSH  -     T4, Free  -     Hemoglobin A1c  -     Basic Metabolic Panel  -     Lipid Panel  -     Microalbumin / Creatinine Urine Ratio - Urine, Clean Catch  -     Fructosamine    3. Acquired hypothyroidism  -     TSH  -     T4, Free  -     Hemoglobin A1c  -     Basic Metabolic Panel  -     Lipid Panel  -     Microalbumin / Creatinine Urine Ratio - Urine, Clean Catch  -     Fructosamine             Follow Up     Return in about 3 months (around 6/14/2024).    Must check bs @ minimum daily on insulin   Needs to keep BP log as well as BS log  No data to review  Unsafe to make any additional changes at this time  Labs today    Patient was given instructions and counseling regarding his condition or for health maintenance advice. Please see specific information pulled into the AVS if appropriate.     ALTAGRACIA Rogers

## 2024-03-15 LAB
ALBUMIN/CREAT UR: 129 MG/G CREAT (ref 0–29)
BUN SERPL-MCNC: 29 MG/DL (ref 8–27)
BUN/CREAT SERPL: 21 (ref 10–24)
CALCIUM SERPL-MCNC: 9.1 MG/DL (ref 8.6–10.2)
CHLORIDE SERPL-SCNC: 103 MMOL/L (ref 96–106)
CHOLEST SERPL-MCNC: 143 MG/DL (ref 100–199)
CO2 SERPL-SCNC: 27 MMOL/L (ref 20–29)
CREAT SERPL-MCNC: 1.35 MG/DL (ref 0.76–1.27)
CREAT UR-MCNC: 125.1 MG/DL
EGFRCR SERPLBLD CKD-EPI 2021: 52 ML/MIN/1.73
FRUCTOSAMINE SERPL-SCNC: 411 UMOL/L (ref 0–285)
GLUCOSE SERPL-MCNC: 248 MG/DL (ref 70–99)
HBA1C MFR BLD: 10.9 % (ref 4.8–5.6)
HDLC SERPL-MCNC: 31 MG/DL
IMP & REVIEW OF LAB RESULTS: NORMAL
LDLC SERPL CALC-MCNC: 85 MG/DL (ref 0–99)
MICROALBUMIN UR-MCNC: 161.4 UG/ML
POTASSIUM SERPL-SCNC: 5 MMOL/L (ref 3.5–5.2)
REPORT: NORMAL
SODIUM SERPL-SCNC: 140 MMOL/L (ref 134–144)
T4 FREE SERPL-MCNC: 1.32 NG/DL (ref 0.82–1.77)
TRIGL SERPL-MCNC: 151 MG/DL (ref 0–149)
TSH SERPL DL<=0.005 MIU/L-ACNC: 4.75 UIU/ML (ref 0.45–4.5)
VLDLC SERPL CALC-MCNC: 27 MG/DL (ref 5–40)

## 2024-03-19 RX ORDER — INSULIN DEGLUDEC 200 U/ML
38 INJECTION, SOLUTION SUBCUTANEOUS DAILY
Qty: 18 ML | Refills: 0 | Status: SHIPPED | OUTPATIENT
Start: 2024-03-19

## 2024-03-19 NOTE — TELEPHONE ENCOUNTER
Rx Refill Note  Requested Prescriptions     Pending Prescriptions Disp Refills    Tresiba FlexTouch 200 UNIT/ML solution pen-injector pen injection 18 mL 0     Sig: Inject 38 Units under the skin into the appropriate area as directed Daily.      Last office visit with prescribing clinician: 3/14/2024   Last telemedicine visit with prescribing clinician: Visit date not found   Next office visit with prescribing clinician: 6/14/2024                         Would you like a call back once the refill request has been completed: [] Yes [] No    If the office needs to give you a call back, can they leave a voicemail: [] Yes [] No    Katherine Powell  03/19/24, 08:34 EDT

## 2024-04-23 ENCOUNTER — SPECIALTY PHARMACY (OUTPATIENT)
Dept: ENDOCRINOLOGY | Age: 84
End: 2024-04-23
Payer: COMMERCIAL

## 2024-04-23 NOTE — PROGRESS NOTES
Specialty Pharmacy Refill Coordination Note     Bennie is a 83 y.o. male contacted today regarding refills of  2 specialty medication(s).    Reviewed and verified with patient:       Specialty medication(s) and dose(s) confirmed: yes    Refill Questions      Flowsheet Row Most Recent Value   Changes to allergies? No   Changes to medications? No   New conditions or infections since last clinic visit No   Unplanned office visit, urgent care, ED, or hospital admission in the last 4 weeks  No   How does patient/caregiver feel medication is working? Good   Financial problems or insurance changes  No   Since the previous refill, were any specialty medication doses or scheduled injections missed or delayed?  No   Does this patient require a clinical escalation to a pharmacist? No            Delivery Questions      Flowsheet Row Most Recent Value   Delivery method  at Pharmacy   Delivery address verified with patient/caregiver? Yes   Delivery address Home   Number of medications in delivery 2   Medication(s) being filled and delivered Pantoprazole Sodium (Proton Pump Inhibitors), Insulin Degludec   Copay verified? Yes   Copay amount 105.23   Copay form of payment Pay at pickup                   Follow-up: 25 day(s)     Shelby Alvarez, Pharmacy Technician  Specialty Pharmacy Technician

## 2024-04-26 ENCOUNTER — PATIENT OUTREACH (OUTPATIENT)
Dept: CASE MANAGEMENT | Facility: OTHER | Age: 84
End: 2024-04-26
Payer: COMMERCIAL

## 2024-04-26 ENCOUNTER — SPECIALTY PHARMACY (OUTPATIENT)
Dept: ENDOCRINOLOGY | Age: 84
End: 2024-04-26
Payer: COMMERCIAL

## 2024-04-26 DIAGNOSIS — Z79.4 TYPE 2 DIABETES MELLITUS WITH HYPERGLYCEMIA, WITH LONG-TERM CURRENT USE OF INSULIN: Primary | Chronic | ICD-10-CM

## 2024-04-26 DIAGNOSIS — E11.65 TYPE 2 DIABETES MELLITUS WITH HYPERGLYCEMIA, WITH LONG-TERM CURRENT USE OF INSULIN: Primary | Chronic | ICD-10-CM

## 2024-04-26 DIAGNOSIS — E78.49 OTHER HYPERLIPIDEMIA: Chronic | ICD-10-CM

## 2024-04-26 NOTE — PROGRESS NOTES
Specialty Pharmacy Patient Management Program  Endocrinology Reassessment     Bennie Greene was referred by an Endocrinology provider to the Endocrinology Patient Management program offered by [facility] for Type 2 Diabetes. A follow-up outreach was conducted, including assessment of continued therapy appropriateness, medication adherence, and side effect incidence and management for Tresiba.    Changes to Insurance Coverage or Financial Support  [employer]    Relevant Past Medical History and Comorbidities  Relevant medical history and concomitant health conditions were discussed with the patient. The patient's chart has been reviewed for relevant past medical history and comorbid health conditions and updated as necessary.   Past Medical History:   Diagnosis Date    Diabetes mellitus     Dyspepsia     Erectile dysfunction     Gastritis 2014    Marked gastritis by EGD- Dr. BRAXTON Garcia; H. Pylori +    Gastroparesis     s/p gastrectomy    GERD (gastroesophageal reflux disease)     Hypothyroidism     Other hyperlipidemia 2020    Personal history of malignant neoplasm of prostate     2006 s/p XRT    Pes cavus     SCC (squamous cell carcinoma)     scalp/ neck    Type 2 diabetes mellitus 2015     Social History     Socioeconomic History    Marital status:    Tobacco Use    Smoking status: Former     Current packs/day: 0.00     Average packs/day: 1 pack/day for 45.0 years (45.0 ttl pk-yrs)     Types: Cigarettes     Start date: 1959     Quit date: 2004     Years since quittin.3    Smokeless tobacco: Never   Vaping Use    Vaping status: Never Used   Substance and Sexual Activity    Alcohol use: No    Drug use: No    Sexual activity: Not Currently     Problem list reviewed by Marcela Plummer RP on 2024 at  9:32 AM    Hospitalizations and Urgent Care Since Last Assessment  ED Visits, Admissions, or Hospitalizations: Several ED visits related to bladder  cancer  Urgent Office Visits: None reported or documented     Allergies  Known allergies and reactions were discussed with the patient. The patient's chart has been reviewed for allergy information and updated as necessary.   No Known Allergies  Allergies reviewed by Marcela Plummer RPH on 4/26/2024 at  9:30 AM    Relevant Laboratory Values  Relevant laboratory values were discussed with the patient. The following specialty medication dose adjustment(s) are recommended: No adjustments needed at this time.   A1C Last 3 Results          11/6/2023    11:36 3/14/2024    09:00 4/10/2024    02:33   HGBA1C Last 3 Results   Hemoglobin A1C 10.90  10.9  10.3          Details          This result is from an external source.             Lab Results   Component Value Date    HGBA1C 10.3 (H) 04/10/2024     Lab Results   Component Value Date    GLUCOSE 248 (H) 03/14/2024    CALCIUM 9.1 03/14/2024     03/14/2024    K 5.0 03/14/2024    CO2 27 03/14/2024     03/14/2024    BUN 29 (H) 03/14/2024    CREATININE 1.35 (H) 03/14/2024    EGFRIFAFRI 76 12/28/2021    EGFRIFNONA 66 12/28/2021    BCR 21 03/14/2024    ANIONGAP 11.7 11/01/2021     Lab Results   Component Value Date    CHLPL 143 03/14/2024    TRIG 151 (H) 03/14/2024    HDL 31 (L) 03/14/2024    LDL 85 03/14/2024     Microalbumin          5/12/2023    10:31 11/6/2023    11:36 3/14/2024    09:00   Microalbumin   Microalbumin, Urine 71.3  136.9  161.4      Current Medication List  This medication list has been reviewed with the patient and evaluated for any interactions or necessary modifications/recommendations, and updated to include all prescription medications, OTC medications, and supplements the patient is currently taking.  This list reflects what is contained in the patient's profile, which has also been marked as reviewed to communicate to other providers it is the most up to date version of the patient's current medication therapy.     Current Outpatient  Medications:     BD Pen Needle Nola 2nd Gen 32G X 4 MM misc, USE WITH INSULIN EVERY DAY, Disp: 100 each, Rfl: 3    Blood Glucose Monitoring Suppl (Blood Glucose Monitor System) w/Device kit, Use to test 2 (Two) Times a Day., Disp: 1 each, Rfl: 0    glucose blood test strip, Use one test strip 2 (Two) Times a Day. Use as instructed, Disp: 200 each, Rfl: 11    Lancets misc, Use 1 Daily to check blood sugar, Disp: 100 each, Rfl: 1    levothyroxine (SYNTHROID, LEVOTHROID) 50 MCG tablet, TAKE 1 TABLET BY MOUTH EVERY DAY, Disp: 90 tablet, Rfl: 0    mupirocin (BACTROBAN) 2 % ointment, Apply 1 Application topically to the appropriate area as directed 3 (Three) Times a Day., Disp: , Rfl:     pantoprazole (PROTONIX) 40 MG EC tablet, Take 1 tablet by mouth Daily., Disp: 90 tablet, Rfl: 3    Probiotic Product ([employer] [place]) capsule, Take 1 tablet by mouth Daily., Disp: 30 capsule, Rfl: 5    tamsulosin (Flomax) 0.4 MG capsule 24 hr capsule, Take 1 capsule by mouth Daily., Disp: 30 capsule, Rfl: 11    Tresiba FlexTouch 200 UNIT/ML solution pen-injector pen injection, Inject 38 Units under the skin into the appropriate area as directed Daily., Disp: 18 mL, Rfl: 0    uribel (URO-MP) 118 MG capsule capsule, Take 1 capsule by mouth Every 8 (Eight) Hours., Disp: 16 capsule, Rfl: 2    uribel (URO-MP) 118 MG capsule capsule, Take 1 capsule by mouth Every 8 (Eight) Hours., Disp: 16 capsule, Rfl: 2    Medicines reviewed by Marcela Plummer RP on 4/26/2024 at  9:31 AM    Drug Interactions  No clinically significant drug interactions    Recommended Medications Assessment  Aspirin: Not Taking Currently  Statin: Not Taking Currently  ACEi/ARB: Not Taking Currently    Adverse Drug Reactions  Medication tolerability: Tolerating with no to minimal ADRs  Medication plan: Continue therapy with normal follow-up  Plan for ADR Management: No ADRs reported    Adherence, Self-Administration, and Current Therapy Problems  Adherence related to  the patient's specialty therapy was discussed with the patient. The Adherence segment of this outreach has been reviewed and updated.     Adherence Questions  Linked Medication(s) Assessed: Insulin Degludec  On average, how many doses/injections does the patient miss per month?: 4  What are the identified reasons for non-adherence or missed doses? : no problems identified  What is the estimated medication adherence level?: 80-89% (Patient has been in ED several times related to bladder cancer)  Based on the patient/caregiver response and refill history, does this patient require an MTP to track adherence improvements?: no    Additional Barriers to Patient Self-Administration: None identified or disclosed   Methods for Supporting Patient Self-Administration: Continue to follow with fills and clinical assessments     Open Medication Therapy Problems  No medication therapy recommendations to display    Goals of Therapy  Goals related to the patient's specialty therapy were discussed with the patient. The Patient Goals segment of this outreach has been reviewed and updated.   Goals Addressed Today        Specialty Pharmacy General Goal      A1c < 7%    Lab Results   Component Value Date    HGBA1C 10.3 (H) 04/10/2024    HGBA1C 10.9 (H) 03/14/2024    HGBA1C 10.90 (H) 11/06/2023    HGBA1C 7.90 (H) 05/03/2023    HGBA1C 7.70 (H) 11/17/2022                   Quality of Life Assessment   Quality of Life related to the patient's enrollment in the patient management program and services provided was discussed with the patient. The QOL segment of this outreach has been reviewed and updated.  Quality of Life Improvement Scale: 6-A little better    Reassessment Plan & Follow-Up  1. Medication Therapy Changes: No changes  2. Related Plans, Therapy Recommendations, or Issues to Be Addressed: No issues  3. Pharmacist to perform regular assessments no more than (6) months from the previous assessment.  4. Care Coordinator to set up  future refill outreaches, coordinate prescription delivery, and escalate clinical questions to pharmacist.    Attestation  Therapeutic appropriateness: Appropriate   I attest the patient was actively involved in and has agreed to the above plan of care.  If the prescribed therapy is at any point deemed not appropriate based on the current or future assessments, a consultation will be initiated with the patient's specialty care provider to determine the best course of action. The revised plan of therapy will be documented along with any required assessments and/or additional patient education provided.     Shelli Plummer, PharmD, MM  Clinical Specialty Pharmacist, Endocrinology  4/26/2024  09:45 EDT

## 2024-04-26 NOTE — OUTREACH NOTE
AMBULATORY CASE MANAGEMENT NOTE    Names and Relationships of Patient/Support Persons: Caller: Bennie Greene; Relationship: Self -     CCM Interim Update    Spoke with patient and wife. Introduced self and role. Discussed the purpose, goals, and expectations of the CCM program. Patient declined program at this time. Wife states patient has been diagnosed with bladder cancer. He has an appointment with the oncologist next Wednesday. She asked if ACM would make PCP aware of patient's diagnosis, ACM agreed. ACM wished patient well, no other needs noted.        Denise SEXTON  Ambulatory Case Management    4/26/2024, 13:36 EDT

## 2024-05-14 ENCOUNTER — TELEPHONE (OUTPATIENT)
Dept: ENDOCRINOLOGY | Age: 84
End: 2024-05-14
Payer: COMMERCIAL

## 2024-05-14 NOTE — TELEPHONE ENCOUNTER
Patient's wife called stating patient's blood sugar was 280. He was just diagnosed with bladder cancer. They are asking if they need to increase the dose on the insulin

## 2024-05-15 ENCOUNTER — APPOINTMENT (OUTPATIENT)
Dept: GENERAL RADIOLOGY | Facility: HOSPITAL | Age: 84
End: 2024-05-15
Payer: MEDICARE

## 2024-05-15 ENCOUNTER — APPOINTMENT (OUTPATIENT)
Dept: CT IMAGING | Facility: HOSPITAL | Age: 84
End: 2024-05-15
Payer: MEDICARE

## 2024-05-15 ENCOUNTER — HOSPITAL ENCOUNTER (INPATIENT)
Facility: HOSPITAL | Age: 84
LOS: 5 days | Discharge: HOME | End: 2024-05-20
Attending: EMERGENCY MEDICINE | Admitting: INTERNAL MEDICINE
Payer: MEDICARE

## 2024-05-15 DIAGNOSIS — G93.40 ENCEPHALOPATHY: ICD-10-CM

## 2024-05-15 DIAGNOSIS — E11.69 TYPE 2 DIABETES MELLITUS WITH OTHER SPECIFIED COMPLICATION, UNSPECIFIED WHETHER LONG TERM INSULIN USE: ICD-10-CM

## 2024-05-15 DIAGNOSIS — N39.0 ACUTE UTI: Primary | ICD-10-CM

## 2024-05-15 PROBLEM — C67.9 BLADDER CANCER: Status: ACTIVE | Noted: 2024-05-15

## 2024-05-15 LAB
ALBUMIN SERPL-MCNC: 3.3 G/DL (ref 3.5–5.2)
ALBUMIN/GLOB SERPL: 0.8 G/DL
ALP SERPL-CCNC: 150 U/L (ref 39–117)
ALT SERPL W P-5'-P-CCNC: 20 U/L (ref 1–41)
ANION GAP SERPL CALCULATED.3IONS-SCNC: 11 MMOL/L (ref 5–15)
AST SERPL-CCNC: 13 U/L (ref 1–40)
ATMOSPHERIC PRESS: 740.5 MMHG
B-OH-BUTYR SERPL-SCNC: 0.41 MMOL/L (ref 0.02–0.27)
BACTERIA UR QL AUTO: ABNORMAL /HPF
BASE EXCESS BLDV CALC-SCNC: 0.1 MMOL/L (ref -2–2)
BASOPHILS # BLD AUTO: 0.04 10*3/MM3 (ref 0–0.2)
BASOPHILS NFR BLD AUTO: 0.3 % (ref 0–1.5)
BILIRUB SERPL-MCNC: 0.3 MG/DL (ref 0–1.2)
BILIRUB UR QL STRIP: NEGATIVE
BUN SERPL-MCNC: 36 MG/DL (ref 8–23)
BUN/CREAT SERPL: 20.3 (ref 7–25)
CALCIUM SPEC-SCNC: 9.1 MG/DL (ref 8.6–10.5)
CHLORIDE SERPL-SCNC: 104 MMOL/L (ref 98–107)
CLARITY UR: ABNORMAL
CO2 BLDA-SCNC: 25.3 MMOL/L (ref 23–27)
CO2 SERPL-SCNC: 24 MMOL/L (ref 22–29)
COLOR UR: YELLOW
CREAT SERPL-MCNC: 1.77 MG/DL (ref 0.76–1.27)
D-LACTATE SERPL-SCNC: 0.9 MMOL/L (ref 0.5–2)
DEPRECATED RDW RBC AUTO: 41.2 FL (ref 37–54)
DEVICE COMMENT: ABNORMAL
EGFRCR SERPLBLD CKD-EPI 2021: 37.9 ML/MIN/1.73
EOSINOPHIL # BLD AUTO: 0.04 10*3/MM3 (ref 0–0.4)
EOSINOPHIL NFR BLD AUTO: 0.3 % (ref 0.3–6.2)
ERYTHROCYTE [DISTWIDTH] IN BLOOD BY AUTOMATED COUNT: 13.2 % (ref 12.3–15.4)
GLOBULIN UR ELPH-MCNC: 4.2 GM/DL
GLUCOSE BLDC GLUCOMTR-MCNC: 111 MG/DL (ref 70–130)
GLUCOSE BLDC GLUCOMTR-MCNC: 319 MG/DL (ref 70–130)
GLUCOSE SERPL-MCNC: 326 MG/DL (ref 65–99)
GLUCOSE UR STRIP-MCNC: ABNORMAL MG/DL
HCO3 BLDV-SCNC: 24.2 MMOL/L (ref 22–28)
HCT VFR BLD AUTO: 35.9 % (ref 37.5–51)
HGB BLD-MCNC: 11.1 G/DL (ref 13–17.7)
HGB UR QL STRIP.AUTO: ABNORMAL
HYALINE CASTS UR QL AUTO: ABNORMAL /LPF
IMM GRANULOCYTES # BLD AUTO: 0.08 10*3/MM3 (ref 0–0.05)
IMM GRANULOCYTES NFR BLD AUTO: 0.6 % (ref 0–0.5)
KETONES UR QL STRIP: ABNORMAL
LEUKOCYTE ESTERASE UR QL STRIP.AUTO: ABNORMAL
LYMPHOCYTES # BLD AUTO: 0.75 10*3/MM3 (ref 0.7–3.1)
LYMPHOCYTES NFR BLD AUTO: 5.4 % (ref 19.6–45.3)
MCH RBC QN AUTO: 26.4 PG (ref 26.6–33)
MCHC RBC AUTO-ENTMCNC: 30.9 G/DL (ref 31.5–35.7)
MCV RBC AUTO: 85.3 FL (ref 79–97)
MODALITY: ABNORMAL
MONOCYTES # BLD AUTO: 0.95 10*3/MM3 (ref 0.1–0.9)
MONOCYTES NFR BLD AUTO: 6.8 % (ref 5–12)
NEUTROPHILS NFR BLD AUTO: 12.14 10*3/MM3 (ref 1.7–7)
NEUTROPHILS NFR BLD AUTO: 86.6 % (ref 42.7–76)
NITRITE UR QL STRIP: NEGATIVE
NRBC BLD AUTO-RTO: 0 /100 WBC (ref 0–0.2)
PCO2 BLDV: 36.1 MM HG (ref 41–51)
PH BLDV: 7.43 PH UNITS (ref 7.31–7.41)
PH UR STRIP.AUTO: 5.5 [PH] (ref 5–8)
PLATELET # BLD AUTO: 336 10*3/MM3 (ref 140–450)
PMV BLD AUTO: 9.8 FL (ref 6–12)
PO2 BLDV: 36.2 MM HG (ref 35–45)
POTASSIUM SERPL-SCNC: 4.5 MMOL/L (ref 3.5–5.2)
PROT SERPL-MCNC: 7.5 G/DL (ref 6–8.5)
PROT UR QL STRIP: ABNORMAL
RBC # BLD AUTO: 4.21 10*6/MM3 (ref 4.14–5.8)
RBC # UR STRIP: ABNORMAL /HPF
REF LAB TEST METHOD: ABNORMAL
SAO2 % BLDCOV: 71.7 % (ref 45–75)
SODIUM SERPL-SCNC: 139 MMOL/L (ref 136–145)
SP GR UR STRIP: 1.02 (ref 1–1.03)
SQUAMOUS #/AREA URNS HPF: ABNORMAL /HPF
TOTAL RATE: 18 BREATHS/MINUTE
UROBILINOGEN UR QL STRIP: ABNORMAL
WBC # UR STRIP: ABNORMAL /HPF
WBC NRBC COR # BLD AUTO: 14 10*3/MM3 (ref 3.4–10.8)

## 2024-05-15 PROCEDURE — 80053 COMPREHEN METABOLIC PANEL: CPT | Performed by: EMERGENCY MEDICINE

## 2024-05-15 PROCEDURE — 25010000002 CEFTRIAXONE PER 250 MG: Performed by: PHYSICIAN ASSISTANT

## 2024-05-15 PROCEDURE — 83605 ASSAY OF LACTIC ACID: CPT | Performed by: PHYSICIAN ASSISTANT

## 2024-05-15 PROCEDURE — 82010 KETONE BODYS QUAN: CPT | Performed by: PHYSICIAN ASSISTANT

## 2024-05-15 PROCEDURE — 70450 CT HEAD/BRAIN W/O DYE: CPT

## 2024-05-15 PROCEDURE — 25010000002 ENOXAPARIN PER 10 MG: Performed by: INTERNAL MEDICINE

## 2024-05-15 PROCEDURE — 87086 URINE CULTURE/COLONY COUNT: CPT | Performed by: PHYSICIAN ASSISTANT

## 2024-05-15 PROCEDURE — 63710000001 INSULIN GLARGINE PER 5 UNITS: Performed by: INTERNAL MEDICINE

## 2024-05-15 PROCEDURE — 99285 EMERGENCY DEPT VISIT HI MDM: CPT

## 2024-05-15 PROCEDURE — 87040 BLOOD CULTURE FOR BACTERIA: CPT | Performed by: PHYSICIAN ASSISTANT

## 2024-05-15 PROCEDURE — 36415 COLL VENOUS BLD VENIPUNCTURE: CPT

## 2024-05-15 PROCEDURE — 82948 REAGENT STRIP/BLOOD GLUCOSE: CPT

## 2024-05-15 PROCEDURE — 25810000003 SODIUM CHLORIDE 0.9 % SOLUTION: Performed by: INTERNAL MEDICINE

## 2024-05-15 PROCEDURE — 71045 X-RAY EXAM CHEST 1 VIEW: CPT

## 2024-05-15 PROCEDURE — 81001 URINALYSIS AUTO W/SCOPE: CPT | Performed by: EMERGENCY MEDICINE

## 2024-05-15 PROCEDURE — 85025 COMPLETE CBC W/AUTO DIFF WBC: CPT | Performed by: EMERGENCY MEDICINE

## 2024-05-15 PROCEDURE — 25810000003 SODIUM CHLORIDE 0.9 % SOLUTION: Performed by: PHYSICIAN ASSISTANT

## 2024-05-15 PROCEDURE — 63710000001 INSULIN LISPRO (HUMAN) PER 5 UNITS: Performed by: INTERNAL MEDICINE

## 2024-05-15 PROCEDURE — 82803 BLOOD GASES ANY COMBINATION: CPT

## 2024-05-15 RX ORDER — POLYETHYLENE GLYCOL 3350 17 G/17G
17 POWDER, FOR SOLUTION ORAL DAILY PRN
Status: DISCONTINUED | OUTPATIENT
Start: 2024-05-15 | End: 2024-05-20 | Stop reason: HOSPADM

## 2024-05-15 RX ORDER — SODIUM CHLORIDE 0.9 % (FLUSH) 0.9 %
10 SYRINGE (ML) INJECTION AS NEEDED
Status: DISCONTINUED | OUTPATIENT
Start: 2024-05-15 | End: 2024-05-20 | Stop reason: HOSPADM

## 2024-05-15 RX ORDER — ONDANSETRON 4 MG/1
4 TABLET, ORALLY DISINTEGRATING ORAL EVERY 6 HOURS PRN
Status: DISCONTINUED | OUTPATIENT
Start: 2024-05-15 | End: 2024-05-20 | Stop reason: HOSPADM

## 2024-05-15 RX ORDER — PANTOPRAZOLE SODIUM 40 MG/1
40 TABLET, DELAYED RELEASE ORAL DAILY
Status: DISCONTINUED | OUTPATIENT
Start: 2024-05-15 | End: 2024-05-20 | Stop reason: HOSPADM

## 2024-05-15 RX ORDER — BISACODYL 10 MG
10 SUPPOSITORY, RECTAL RECTAL DAILY PRN
Status: DISCONTINUED | OUTPATIENT
Start: 2024-05-15 | End: 2024-05-20 | Stop reason: HOSPADM

## 2024-05-15 RX ORDER — NICOTINE POLACRILEX 4 MG
15 LOZENGE BUCCAL
Status: DISCONTINUED | OUTPATIENT
Start: 2024-05-15 | End: 2024-05-20 | Stop reason: HOSPADM

## 2024-05-15 RX ORDER — ACETAMINOPHEN 325 MG/1
650 TABLET ORAL EVERY 4 HOURS PRN
Status: DISCONTINUED | OUTPATIENT
Start: 2024-05-15 | End: 2024-05-20 | Stop reason: HOSPADM

## 2024-05-15 RX ORDER — BISACODYL 5 MG/1
5 TABLET, DELAYED RELEASE ORAL DAILY PRN
Status: DISCONTINUED | OUTPATIENT
Start: 2024-05-15 | End: 2024-05-20 | Stop reason: HOSPADM

## 2024-05-15 RX ORDER — ENOXAPARIN SODIUM 100 MG/ML
40 INJECTION SUBCUTANEOUS EVERY 24 HOURS
Status: DISCONTINUED | OUTPATIENT
Start: 2024-05-15 | End: 2024-05-20 | Stop reason: HOSPADM

## 2024-05-15 RX ORDER — LEVOTHYROXINE SODIUM 0.05 MG/1
50 TABLET ORAL DAILY
Status: DISCONTINUED | OUTPATIENT
Start: 2024-05-15 | End: 2024-05-20 | Stop reason: HOSPADM

## 2024-05-15 RX ORDER — DEXTROSE MONOHYDRATE 25 G/50ML
25 INJECTION, SOLUTION INTRAVENOUS
Status: DISCONTINUED | OUTPATIENT
Start: 2024-05-15 | End: 2024-05-20 | Stop reason: HOSPADM

## 2024-05-15 RX ORDER — ACETAMINOPHEN 650 MG/1
650 SUPPOSITORY RECTAL EVERY 4 HOURS PRN
Status: DISCONTINUED | OUTPATIENT
Start: 2024-05-15 | End: 2024-05-20 | Stop reason: HOSPADM

## 2024-05-15 RX ORDER — TAMSULOSIN HYDROCHLORIDE 0.4 MG/1
0.4 CAPSULE ORAL DAILY
Status: DISCONTINUED | OUTPATIENT
Start: 2024-05-15 | End: 2024-05-20 | Stop reason: HOSPADM

## 2024-05-15 RX ORDER — ONDANSETRON 2 MG/ML
4 INJECTION INTRAMUSCULAR; INTRAVENOUS EVERY 6 HOURS PRN
Status: DISCONTINUED | OUTPATIENT
Start: 2024-05-15 | End: 2024-05-20 | Stop reason: HOSPADM

## 2024-05-15 RX ORDER — SODIUM CHLORIDE 9 MG/ML
100 INJECTION, SOLUTION INTRAVENOUS CONTINUOUS
Status: DISCONTINUED | OUTPATIENT
Start: 2024-05-15 | End: 2024-05-20

## 2024-05-15 RX ORDER — INSULIN LISPRO 100 [IU]/ML
2-9 INJECTION, SOLUTION INTRAVENOUS; SUBCUTANEOUS
Status: DISCONTINUED | OUTPATIENT
Start: 2024-05-15 | End: 2024-05-17

## 2024-05-15 RX ORDER — SODIUM CHLORIDE 0.9 % (FLUSH) 0.9 %
10 SYRINGE (ML) INJECTION EVERY 12 HOURS SCHEDULED
Status: DISCONTINUED | OUTPATIENT
Start: 2024-05-15 | End: 2024-05-20 | Stop reason: HOSPADM

## 2024-05-15 RX ORDER — ACETAMINOPHEN 160 MG/5ML
650 SOLUTION ORAL EVERY 4 HOURS PRN
Status: DISCONTINUED | OUTPATIENT
Start: 2024-05-15 | End: 2024-05-20 | Stop reason: HOSPADM

## 2024-05-15 RX ORDER — SODIUM CHLORIDE 9 MG/ML
40 INJECTION, SOLUTION INTRAVENOUS AS NEEDED
Status: DISCONTINUED | OUTPATIENT
Start: 2024-05-15 | End: 2024-05-20 | Stop reason: HOSPADM

## 2024-05-15 RX ORDER — IBUPROFEN 600 MG/1
1 TABLET ORAL
Status: DISCONTINUED | OUTPATIENT
Start: 2024-05-15 | End: 2024-05-20 | Stop reason: HOSPADM

## 2024-05-15 RX ORDER — L.ACID,PARA/B.BIFIDUM/S.THERM 8B CELL
1 CAPSULE ORAL DAILY
Status: DISCONTINUED | OUTPATIENT
Start: 2024-05-15 | End: 2024-05-20 | Stop reason: HOSPADM

## 2024-05-15 RX ORDER — AMOXICILLIN 250 MG
2 CAPSULE ORAL 2 TIMES DAILY PRN
Status: DISCONTINUED | OUTPATIENT
Start: 2024-05-15 | End: 2024-05-20 | Stop reason: HOSPADM

## 2024-05-15 RX ADMIN — Medication 10 ML: at 21:32

## 2024-05-15 RX ADMIN — SODIUM CHLORIDE 1000 ML: 9 INJECTION, SOLUTION INTRAVENOUS at 11:57

## 2024-05-15 RX ADMIN — TAMSULOSIN HYDROCHLORIDE 0.4 MG: 0.4 CAPSULE ORAL at 19:32

## 2024-05-15 RX ADMIN — ENOXAPARIN SODIUM 40 MG: 100 INJECTION SUBCUTANEOUS at 15:10

## 2024-05-15 RX ADMIN — SODIUM CHLORIDE 100 ML/HR: 9 INJECTION, SOLUTION INTRAVENOUS at 19:31

## 2024-05-15 RX ADMIN — INSULIN LISPRO 7 UNITS: 100 INJECTION, SOLUTION INTRAVENOUS; SUBCUTANEOUS at 17:10

## 2024-05-15 RX ADMIN — PANTOPRAZOLE SODIUM 40 MG: 40 TABLET, DELAYED RELEASE ORAL at 17:10

## 2024-05-15 RX ADMIN — INSULIN GLARGINE 20 UNITS: 100 INJECTION, SOLUTION SUBCUTANEOUS at 15:10

## 2024-05-15 RX ADMIN — CEFTRIAXONE SODIUM 1000 MG: 1 INJECTION, POWDER, FOR SOLUTION INTRAMUSCULAR; INTRAVENOUS at 12:22

## 2024-05-15 NOTE — PROGRESS NOTES
"[facility] Clinical Pharmacy Services: Enoxaparin Consult    Bennie Greene has a pharmacy consult to dose prophylactic enoxaparin per Dr Acosta's request.     Indication: VTE Prophylaxis  Home Anticoagulation: none     Relevant clinical data and objective history reviewed:  83 y.o. male 177.8 cm (70\") 73.1 kg (161 lb 1.6 oz)   Body mass index is 23.12 kg/m².   Results from last 7 days   Lab Units 05/15/24  1026   PLATELETS 10*3/mm3 336     Estimated Creatinine Clearance: 33.3 mL/min (A) (by C-G formula based on SCr of 1.77 mg/dL (H)).    Assessment/Plan    Will start patient on 40mg subcutaneous every 24 hours, adjusted for renal function. Consult order will be discontinued but pharmacy will continue to follow.     Jaden Greenwood PharmD  Clinical Pharmacist    "

## 2024-05-15 NOTE — ED NOTES
"Pt to ed from home via EMS    Pt reports being \"sick\" the past few days. Pt wife reports pt had some confusion getting dressed this AM. Pt bg 500 and pt wife gave pt insulin at home. Pt A&Ox4 in triage. Pt has no complaints.       "

## 2024-05-15 NOTE — H&P
Patient Name:  Bennie Greene  YOB: 1940  MRN:  [id number]  Admit Date:  5/15/2024  Patient Care Team:  Travon Shah MD as PCP - General (Family Medicine)  Ramila Stanley MD as Consulting Physician (Ophthalmology)  Shila Killian MD as Surgeon (General Surgery)  Mata Wright MD as Consulting Physician (Dermatology)      Subjective   History Present Illness     Chief Complaint   Patient presents with    Hyperglycemia       Mr. Greene is a 83 y.o. with a history of HTN, DM2, Hypothyroid, bladder cancer who presents to [facility] complaining of dysuria and confusion this morning. He was having difficulty dressing himself and was not making sense. They called EMS. She reports he began to have improvement with IVF. Had continued improvement here. He reports dysuria. No flank pain. May have had some chills at home. No CP SOA NVD. He has recently diagnosed bladder cancer (Stage 2 per family). They are about to have chemotherapy training and report plan for neoadjuvant chemotherapy soon. He is not having any peripheral or back pain currently.      Review of Systems   Constitutional:  Positive for chills. Negative for fever.   HENT:  Negative for sore throat and trouble swallowing.    Eyes:  Negative for pain and visual disturbance.   Respiratory:  Negative for cough and shortness of breath.    Cardiovascular:  Negative for chest pain, palpitations and leg swelling.   Gastrointestinal:  Negative for constipation, diarrhea, nausea and vomiting.   Endocrine: Negative for cold intolerance and heat intolerance.   Genitourinary:  Positive for dysuria. Negative for flank pain.   Musculoskeletal:  Negative for neck pain and neck stiffness.   Skin:  Negative for pallor and rash.   Allergic/Immunologic: Negative for environmental allergies and food allergies.   Neurological:  Negative for seizures and syncope.   Hematological:  Negative for adenopathy. Does not bruise/bleed easily.    Psychiatric/Behavioral:  Positive for confusion. Negative for agitation.         Personal History     Past Medical History:   Diagnosis Date    Bladder cancer     Diabetes mellitus     Dyspepsia     Erectile dysfunction     Gastritis 2014    Marked gastritis by EGD- Dr. BRAXTON Garcia; H. Pylori +    Gastroparesis     s/p gastrectomy    GERD (gastroesophageal reflux disease)     Hypothyroidism     Other hyperlipidemia 2020    Personal history of malignant neoplasm of prostate     2006 s/p XRT    Pes cavus     Prostate cancer     and radiation    SCC (squamous cell carcinoma)     scalp/ neck    Type 2 diabetes mellitus 2015     Past Surgical History:   Procedure Laterality Date    CHOLECYSTECTOMY  2014    Dr. Ton Killian    COLONOSCOPY  10/2014    Dr. Killian    PARTIAL GASTRECTOMY  2014    Dr. Killian; for gastric outlet obstruction    UPPER GASTROINTESTINAL ENDOSCOPY       Family History   Problem Relation Age of Onset    Heart attack Mother     Diabetes type II Brother     Diabetes Brother     Prostate cancer Brother     Diabetes type II Son     Breast cancer Sister      Social History     Tobacco Use    Smoking status: Former     Current packs/day: 0.00     Average packs/day: 1 pack/day for 45.0 years (45.0 ttl pk-yrs)     Types: Cigarettes     Start date: 1959     Quit date:      Years since quittin.3    Smokeless tobacco: Never   Vaping Use    Vaping status: Never Used   Substance Use Topics    Alcohol use: No    Drug use: No     No current facility-administered medications on file prior to encounter.     Current Outpatient Medications on File Prior to Encounter   Medication Sig Dispense Refill    BD Pen Needle Nola 2nd Gen 32G X 4 MM misc USE WITH INSULIN EVERY  each 3    Blood Glucose Monitoring Suppl (Blood Glucose Monitor System) w/Device kit Use to test 2 (Two) Times a Day. 1 each 0    glucose blood test strip Use one test strip 2 (Two) Times a Day. Use as  instructed 200 each 11    Lancets misc Use 1 Daily to check blood sugar 100 each 1    levothyroxine (SYNTHROID, LEVOTHROID) 50 MCG tablet TAKE 1 TABLET BY MOUTH EVERY DAY 90 tablet 0    mupirocin (BACTROBAN) 2 % ointment Apply 1 Application topically to the appropriate area as directed 3 (Three) Times a Day.      pantoprazole (PROTONIX) 40 MG EC tablet Take 1 tablet by mouth Daily. 90 tablet 3    Probiotic Product ([employer]) capsule Take 1 tablet by mouth Daily. 30 capsule 5    tamsulosin (Flomax) 0.4 MG capsule 24 hr capsule Take 1 capsule by mouth Daily. 30 capsule 11    Tresiba FlexTouch 200 UNIT/ML solution pen-injector pen injection Inject 38 Units under the skin into the appropriate area as directed Daily. (Patient taking differently: Inject 40 Units under the skin into the appropriate area as directed Daily.) 18 mL 0    uribel (URO-MP) 118 MG capsule capsule Take 1 capsule by mouth Every 8 (Eight) Hours. 16 capsule 2    uribel (URO-MP) 118 MG capsule capsule Take 1 capsule by mouth Every 8 (Eight) Hours. 16 capsule 2     No Known Allergies    Objective    Objective     Vital Signs  Temp:  [97.9 °F (36.6 °C)-98.9 °F (37.2 °C)] 97.9 °F (36.6 °C)  Heart Rate:  [64-78] 65  Resp:  [15-20] 16  BP: (149-162)/(78-91) 149/91  SpO2:  [93 %-98 %] 97 %  on   ;   Device (Oxygen Therapy): room air  Body mass index is 23.12 kg/m².    Physical Exam  Vitals and nursing note reviewed.   Constitutional:       General: He is not in acute distress.     Appearance: He is not diaphoretic.   HENT:      Head: Normocephalic and atraumatic.   Eyes:      Conjunctiva/sclera: Conjunctivae normal.      Pupils: Pupils are equal, round, and reactive to light.   Cardiovascular:      Rate and Rhythm: Normal rate and regular rhythm.      Pulses: Normal pulses.   Pulmonary:      Effort: Pulmonary effort is normal.      Breath sounds: No wheezing.   Abdominal:      General: There is no distension.      Palpations: Abdomen is soft.       Tenderness: There is no abdominal tenderness. There is no guarding or rebound.   Musculoskeletal:         General: No swelling or tenderness.   Skin:     General: Skin is warm and dry.   Neurological:      Mental Status: He is alert.      Cranial Nerves: No cranial nerve deficit.   Psychiatric:         Mood and Affect: Mood normal.         Behavior: Behavior normal.         Results Review:  I reviewed the patient's new clinical results.  I reviewed the patient's new imaging results and agree with the interpretation.  I personally viewed and interpreted the patient's EKG/Telemetry data  I reviewed prior records.    Lab Results (last 24 hours)       Procedure Component Value Units Date/Time    Urinalysis With Microscopic If Indicated (No Culture) - Urine, Clean Catch [[id number]]  (Abnormal) Collected: 05/15/24 1018    Specimen: Urine, Clean Catch Updated: 05/15/24 1048     Color, UA Yellow     Appearance, UA Turbid     pH, UA 5.5     Specific Gravity, UA 1.022     Glucose, UA >=1000 mg/dL (3+)     Ketones, UA Trace     Bilirubin, UA Negative     Blood, UA Large (3+)     Protein,  mg/dL (2+)     Leuk Esterase, UA Large (3+)     Nitrite, UA Negative     Urobilinogen, UA 1.0 E.U./dL    Urinalysis, Microscopic Only - Urine, Clean Catch [[id number]]  (Abnormal) Collected: 05/15/24 1018    Specimen: Urine, Clean Catch Updated: 05/15/24 1110     RBC, UA       Unable to determine due to loaded field     /HPF     WBC, UA Too Numerous to Count /HPF      Bacteria, UA       Unable to determine due to loaded field     /HPF     Squamous Epithelial Cells, UA       Unable to determine due to loaded field     /HPF     Hyaline Casts, UA       Unable to determine due to loaded field     /LPF     Methodology Manual Light Microscopy    Urine Culture - Urine, Urine, Clean Catch [[id number]] Collected: 05/15/24 1018    Specimen: Urine, Clean Catch Updated: 05/15/24 1326    CBC & Differential [[id number]]  (Abnormal) Collected: 05/15/24  1026    Specimen: Blood Updated: 05/15/24 1040    Narrative:      The following orders were created for panel order CBC & Differential.  Procedure                               Abnormality         Status                     ---------                               -----------         ------                     CBC Auto Differential[[id number]]        Abnormal            Final result                 Please view results for these tests on the individual orders.    Comprehensive Metabolic Panel [[id number]]  (Abnormal) Collected: 05/15/24 1026    Specimen: Blood Updated: 05/15/24 1059     Glucose 326 mg/dL      BUN 36 mg/dL      Creatinine 1.77 mg/dL      Sodium 139 mmol/L      Potassium 4.5 mmol/L      Chloride 104 mmol/L      CO2 24.0 mmol/L      Calcium 9.1 mg/dL      Total Protein 7.5 g/dL      Albumin 3.3 g/dL      ALT (SGPT) 20 U/L      AST (SGOT) 13 U/L      Alkaline Phosphatase 150 U/L      Total Bilirubin 0.3 mg/dL      Globulin 4.2 gm/dL      A/G Ratio 0.8 g/dL      BUN/Creatinine Ratio 20.3     Anion Gap 11.0 mmol/L      eGFR 37.9 mL/min/1.73     Narrative:      GFR Normal >60  Chronic Kidney Disease <60  Kidney Failure <15    The GFR formula is only valid for adults with stable renal function between ages 18 and 70.    CBC Auto Differential [[id number]]  (Abnormal) Collected: 05/15/24 1026    Specimen: Blood Updated: 05/15/24 1040     WBC 14.00 10*3/mm3      RBC 4.21 10*6/mm3      Hemoglobin 11.1 g/dL      Hematocrit 35.9 %      MCV 85.3 fL      MCH 26.4 pg      MCHC 30.9 g/dL      RDW 13.2 %      RDW-SD 41.2 fl      MPV 9.8 fL      Platelets 336 10*3/mm3      Neutrophil % 86.6 %      Lymphocyte % 5.4 %      Monocyte % 6.8 %      Eosinophil % 0.3 %      Basophil % 0.3 %      Immature Grans % 0.6 %      Neutrophils, Absolute 12.14 10*3/mm3      Lymphocytes, Absolute 0.75 10*3/mm3      Monocytes, Absolute 0.95 10*3/mm3      Eosinophils, Absolute 0.04 10*3/mm3      Basophils, Absolute 0.04 10*3/mm3      Immature  Grans, Absolute 0.08 10*3/mm3      nRBC 0.0 /100 WBC     Lactic Acid, Plasma [[id number]]  (Normal) Collected: 05/15/24 1026    Specimen: Blood Updated: 05/15/24 1055     Lactate 0.9 mmol/L     Ketone Bodies, Serum (Not performed at [place]) [[id number]]  (Abnormal) Collected: 05/15/24 1026    Specimen: Blood Updated: 05/15/24 1105    Narrative:      The following orders were created for panel order Ketone Bodies, Serum (Not performed at [place]).  Procedure                               Abnormality         Status                     ---------                               -----------         ------                     Beta Hydroxybutyrate Alex...[[id number]]  Abnormal            Final result                 Please view results for these tests on the individual orders.    Beta Hydroxybutyrate Quantitative [[id number]]  (Abnormal) Collected: 05/15/24 1026    Specimen: Blood Updated: 05/15/24 1105     Beta-Hydroxybutyrate Quant 0.409 mmol/L     Narrative:      In the assessment of possible diabetic ketoacidosis, the test should be interpreted along with other clinical and laboratory findings.  A level greater than 1 mmol/L should require further evaluation and levels of more than 3 mmol/L require immediate medical review.    Blood Gas, Venous - [[id number]]  (Abnormal) Collected: 05/15/24 1130    Specimen: Venous Blood Updated: 05/15/24 1133     pH, Venous 7.434 pH Units      pCO2, Venous 36.1 mm Hg      pO2, Venous 36.2 mm Hg      HCO3, Venous 24.2 mmol/L      Base Excess, Venous 0.1 mmol/L      Comment: Serial Number: [id number]:  [id number]        O2 Saturation, Venous 71.7 %      CO2 Content 25.3 mmol/L      Barometric Pressure for Blood Gas 740.5000 mmHg      Modality Room Air     Rate 18 Breaths/minute      Device Comment [id number]    Blood Culture - Blood, Arm, Right [[id number]] Collected: 05/15/24 1155    Specimen: Blood from Arm, Right Updated: 05/15/24 1204    Blood Culture - Blood, Arm, Left [[id number]]  Collected: 05/15/24 1220    Specimen: Blood from Arm, Left Updated: 05/15/24 1224            Imaging Results (Last 24 Hours)       Procedure Component Value Units Date/Time    XR Chest 1 View [[id number]] Collected: 05/15/24 1057     Updated: 05/15/24 1101    Narrative:      XR CHEST 1 VW-     Clinical: Lethargy     COMPARISON examination 10/31/2021     FINDINGS: Cardiac size within normal limits, no mediastinal or hilar  abnormality. The lungs are clear. No effusion or edema seen.     CONCLUSION: No active disease of the chest     This report was finalized on 5/15/2024 10:58 AM by Dr. Kevin Ponce M.D on Workstation: [id number]       CT Head Without Contrast [[id number]] Collected: 05/15/24 1057     Updated: 05/15/24 1057    Narrative:      CT HEAD WITHOUT CONTRAST     HISTORY: Weakness, unsteady gait, confusion.     COMPARISON: CT head 10/31/2021.     FINDINGS: The brain and ventricles are symmetrical. There is no evidence  of hemorrhage, hydrocephalus or of abnormal extra-axial fluid. No focal  area of decreased attenuation to suggest acute infarction is identified.  Further evaluation could be performed with a MRI examination of the  brain as indicated.     Radiation dose reduction techniques were utilized, including automated  exposure control and exposure modulation based on body size.                  Results for orders placed during the hospital encounter of 10/31/21    Adult Transthoracic Echo Complete W/ Cont if Necessary Per Protocol    Interpretation Summary  · Left ventricular ejection fraction appears to be 66 - 70%. Left ventricular systolic function is normal.  · Left ventricular diastolic function was normal.  · No significant valvular stenosis or regurgitation noted.      Telemetry Scan   Final Result      Telemetry Scan   Final Result      Telemetry Scan   Final Result      Telemetry Scan   Final Result           Assessment/Plan     Active Hospital Problems    Diagnosis  POA    **Acute UTI [N39.0]   Yes    Bladder cancer [C67.9]  Yes    Gastroesophageal reflux disease without esophagitis [K21.9]  Yes    Acquired hypothyroidism [E03.9]  Yes    Type 2 diabetes mellitus with hyperglycemia, with long-term current use of insulin [E11.65, Z79.4]  Not Applicable      Resolved Hospital Problems   No resolved problems to display.       Mr. Greene is a 83 y.o.     Acute Cystitis: Symptoms present and UA with pyuria. Cx pending. Continue rocephin.  Metabolic Encephalopathy: 2/2 above. Monitoring. Improving.  Bladder Cancer: Continue outpatient follow up. Monitor symptom progression with UTI treatment. Resume home regimen for the previous obstructive symptoms.  MENDOZA: Continue IVF. Monitor.  Elevated Alk Phos: Repeat to confirm.  DM2: A1c 10.3. Resumed about 1/2 long acting insulin and starting SSI. Monitor requirements and dietary tolerance.  Hypothyroidism: Synthroid  GERD: PPI  PPx: Lovenox  I discussed the patient's findings and my recommendations with patient, family, and ED provider.    Francisco Acosta MD  [facility]  05/15/24  14:58 EDT    Dictated portions of note using dragon dictation software.

## 2024-05-15 NOTE — ED NOTES
Pt reports fatigue starting Sunday. TOday pt family reports that he was up all night, and was somewhat confused this morning, and had elevated blood sugar over 500 today. She have him his insulin, and then it came down to 380 mg/dl. Pt  AAOx4 now.  Recent dx of bladder cancer.

## 2024-05-15 NOTE — PLAN OF CARE
Problem: Adult Inpatient Plan of Care  Goal: Plan of Care Review  Outcome: Ongoing, Progressing  Goal: Patient-Specific Goal (Individualized)  Outcome: Ongoing, Progressing  Goal: Absence of Hospital-Acquired Illness or Injury  Outcome: Ongoing, Progressing  Goal: Optimal Comfort and Wellbeing  Outcome: Ongoing, Progressing  Goal: Readiness for Transition of Care  Outcome: Ongoing, Progressing  Intervention: Mutually Develop Transition Plan  Recent Flowsheet Documentation  Taken 5/15/2024 1438 by Nidia Perez, RN  Equipment Currently Used at Home: none  Taken 5/15/2024 1437 by Nidia Perez, RN  Transportation Anticipated: family or friend will provide  Patient/Family Anticipated Services at Transition: none  Patient/Family Anticipates Transition to: home     Problem: Fall Injury Risk  Goal: Absence of Fall and Fall-Related Injury  Outcome: Ongoing, Progressing   Goal Outcome Evaluation:   New admit from the er. Patient alert and orientated. Wife at the bedside. Reports some pressure with urinating.

## 2024-05-15 NOTE — ED PROVIDER NOTES
I supervised care provided by the midlevel provider.   We have discussed this patient's history, physical exam, and treatment plan.  I have reviewed the note and personally saw and examined the patient and agree with the plan of care.   I talked with the patient as well as the spouse.  This is a gentleman who came to the emergency department with problems with elevation in the glucose in the 500 range as well as some episodes of confusion has been occurring for the past 3 days.  His confusion seems to him just not quite acting right doing inappropriate things and having an appropriate behavior.  This morning he was trying to put a hand towel in his underwear.  His wife reported that his glucose was in the 500s.  When I am seeing him right now that confusion is 100% resolved and he is acting his normal self.  He does have a history of frequent urination which she goes about every hour.  No really definitive chest pain, abdominal pain, dysuria.  There is no report of any vomiting or diarrhea.  Wife states that he is pretty much acting his normal self at this time.  He is recently been diagnosed with bladder cancer but has not started any treatment as of yet.    GENERAL: Elderly male.  Not distressed.  Vital signs on my exam are unremarkable.  He does not look septic or toxic.  HENT: nares patent  Head/neck/ face are symmetric without gross deformity or swelling  EYES: no scleral icterus  CV: regular rhythm, regular rate with intact distal pulses  RESPIRATORY: normal effort and no respiratory distress  ABDOMEN: soft and nontender  MUSCULOSKELETAL: no deformity.  No edema.  Intact distal pulses to upper and lower extremities that are equal strong and symmetric.  NEURO: alert and appropriate, moves all extremities, follows commands.  No focal motor or sensory changes.  Patient is alert and oriented x 3.  SKIN: warm, dry    Vital signs and nursing notes reviewed.    Plan   ED Course as of 05/15/24 1845   Wed May 15, 2024    1019 Patient presents with 2 to 3-day history of confusion, altered mental status, generalized weakness.  Stable vitals.  Neurologically intact at this time.  Differential diagnoses include but not limited to acute UTI, DKA, electrolyte abnormality, brain metastases.    Patient is not a TNKase or TMD candidate given onset of symptoms.  NIH stroke scale 0. [EE]   1045 WBC(!): 14.00 [EE]   1045 CT imaging of the brain independently interpreted myself shows no evidence of obvious mass or hemorrhage. [EE]   1058 Lactate: 0.9 [EE]   1058 Appearance, UA(!): Turbid [EE]   1058 Leukocytes, UA(!): Large (3+) [EE]   1102 BUN(!): 36 [EE]   1102 Creatinine(!): 1.77 [EE]   1102 CO2: 24.0 [EE]   1102 Anion Gap: 11.0 [EE]   1127 WBC, UA(!): Too Numerous to Count [EE]   1128 Bacteria, UA(!): Unable to determine due to loaded field [EE]   1131 Updated patient and family on plan for admission.  They are in agreement. [EE]   1157 Karla [EE]   1218 pH, Venous(!): 7.434 [MM]   1218 pCO2, Venous(!): 36.1 [MM]   1218 Beta-Hydroxybutyrate Quant(!): 0.409 [MM]   1218 WBC(!): 14.00 [MM]   1218 Creatinine(!): 1.77 [MM]   1218 BUN(!): 36 [MM]   1218 Previous creatinine is 1.4. [MM]   1218 CO2: 24.0 [MM]   1219 Anion Gap: 11.0 [MM]   1219 WBC, UA(!): Too Numerous to Count [MM]      ED Course User Index  [EE] Ba Bain PA  [MM] Grant Christy MD         This is a patient who has hyperglycemia and an acute UTI with leukocytosis.  Patient's anion gap is normal and his CO2 is normal.  He also had a normal pH.  I think his altered mental status and encephalopathy is probably due from elevation in glucose as well as his acute UTI.  Will start him on some antibiotics admitted to the hospital.  Currently he is alert and oriented x 3 and is acting his normal self at this time.  All questions answered at this time.         Grant Christy MD  05/15/24 [id number]

## 2024-05-15 NOTE — ED PROVIDER NOTES
EMERGENCY DEPARTMENT ENCOUNTER    Room Number:  N545/1  Date of encounter:  5/15/2024  PCP: Travon Shah MD  Historian: Patient, spouse  Chronic or social conditions impacting care (social determinants of health): None    HPI:  Chief Complaint: Altered mental status, weakness  A complete HPI/ROS/PMH/PSH/SH/FH are unobtainable due to: Nothing    Context: Bennie Greene is a 83 y.o. male with a history of diabetes, new onset bladder cancer.  He presents to the ED c/o acute altered mental status, confusion for the past several days.  Patient is normally a very functional person.  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  His wife noticed that Sunday he seemed off and was mildly confused.  This morning the patient tried to put on a hand towel his underwear.  The patient himself denies any complaints.  The wife has noticed that his blood sugar has been elevated the past several days, it was up to 500 this morning.  They deny any vomiting, diarrhea, abdominal pain.  They deny any chest pain, shortness of breath, headache.  They deny any recent falls.  He does have a new onset bladder cancer which is yet to be treated and.    Review of prior external notes (non-ED):   Reviewed endocrinology office visit from 3/14/2024.  Patient being followed for type 2 diabetes.    Review of prior external test results outside of this encounter:  I reviewed a CMP from 4/13/2024.  Potassium 4.1, creatinine 1.44    PAST MEDICAL HISTORY  Active Ambulatory Problems     Diagnosis Date Noted    Type 2 diabetes mellitus with hyperglycemia, with long-term current use of insulin 04/06/2016    Personal history of malignant neoplasm of prostate 10/18/2017    Other hyperlipidemia 03/02/2020    Acquired hypothyroidism 03/02/2020    Syncope and collapse 10/31/2021    C4 cervical fracture 10/31/2021    Thumb fracture 10/31/2021    Gastroparesis 08/30/2022    Gastroesophageal reflux disease without esophagitis 08/30/2022    Pain of  upper abdomen 2022    Nausea 2022    Heartburn 2022    History of Helicobacter pylori infection 2022    History of gastrectomy 2022    Chronic right hip pain 10/27/2022     Resolved Ambulatory Problems     Diagnosis Date Noted    No Resolved Ambulatory Problems     Past Medical History:   Diagnosis Date    Bladder cancer     Diabetes mellitus     Dyspepsia     Erectile dysfunction     Gastritis 2014    GERD (gastroesophageal reflux disease)     Hypothyroidism     Pes cavus     Prostate cancer     SCC (squamous cell carcinoma)     Type 2 diabetes mellitus          PAST SURGICAL HISTORY  Past Surgical History:   Procedure Laterality Date    CHOLECYSTECTOMY  2014    Dr. Ton Killian    COLONOSCOPY  10/2014    Dr. Killian    PARTIAL GASTRECTOMY  2014    Dr. Killian; for gastric outlet obstruction    UPPER GASTROINTESTINAL ENDOSCOPY           FAMILY HISTORY  Family History   Problem Relation Age of Onset    Heart attack Mother     Diabetes type II Brother     Diabetes Brother     Prostate cancer Brother     Diabetes type II Son     Breast cancer Sister          SOCIAL HISTORY  Social History     Socioeconomic History    Marital status:    Tobacco Use    Smoking status: Former     Current packs/day: 0.00     Average packs/day: 1 pack/day for 45.0 years (45.0 ttl pk-yrs)     Types: Cigarettes     Start date: 1959     Quit date:      Years since quittin.3    Smokeless tobacco: Never   Vaping Use    Vaping status: Never Used   Substance and Sexual Activity    Alcohol use: No    Drug use: No    Sexual activity: Not Currently         ALLERGIES  Patient has no known allergies.        REVIEW OF SYSTEMS  All systems reviewed and negative except for those discussed in HPI.       PHYSICAL EXAM    I have reviewed the triage vital signs and nursing notes.    ED Triage Vitals [05/15/24 0943]   Temp Heart Rate Resp BP SpO2   98.9 °F (37.2 °C) 66 20 154/81 98 %       Temp src Heart Rate Source Patient Position BP Location FiO2 (%)   Tympanic Monitor Lying Right arm --       Physical Exam  GENERAL: Alert, oriented, not distressed  HENT: head atraumatic, no nuchal rigidity  EYES: no scleral icterus, EOMI  CV: regular rhythm, regular rate, no murmur  RESPIRATORY: normal effort, CTA  ABDOMEN: soft, nontender  MUSCULOSKELETAL: no deformity, FROM, no calf swelling or tenderness  NEURO: alert, normal speech, normal cerebellar function,  NIH stroke scale 0  SKIN: warm, dry        LAB RESULTS  Recent Results (from the past 24 hour(s))   Urinalysis With Microscopic If Indicated (No Culture) - Urine, Clean Catch    Collection Time: 05/15/24 10:18 AM    Specimen: Urine, Clean Catch   Result Value Ref Range    Color, UA Yellow Yellow, Straw    Appearance, UA Turbid (A) Clear    pH, UA 5.5 5.0 - 8.0    Specific Gravity, UA 1.022 1.005 - 1.030    Glucose, UA >=1000 mg/dL (3+) (A) Negative    Ketones, UA Trace (A) Negative    Bilirubin, UA Negative Negative    Blood, UA Large (3+) (A) Negative    Protein,  mg/dL (2+) (A) Negative    Leuk Esterase, UA Large (3+) (A) Negative    Nitrite, UA Negative Negative    Urobilinogen, UA 1.0 E.U./dL 0.2 - 1.0 E.U./dL   Urinalysis, Microscopic Only - Urine, Clean Catch    Collection Time: 05/15/24 10:18 AM    Specimen: Urine, Clean Catch   Result Value Ref Range    RBC, UA Unable to determine due to loaded field (A) None Seen, 0-2 /HPF    WBC, UA Too Numerous to Count (A) None Seen, 0-2 /HPF    Bacteria, UA Unable to determine due to loaded field (A) None Seen /HPF    Squamous Epithelial Cells, UA Unable to determine due to loaded field (A) None Seen, 0-2 /HPF    Hyaline Casts, UA Unable to determine due to loaded field None Seen /LPF    Methodology Manual Light Microscopy    Comprehensive Metabolic Panel    Collection Time: 05/15/24 10:26 AM    Specimen: Blood   Result Value Ref Range    Glucose 326 (H) 65 - 99 mg/dL    BUN 36 (H) 8 - 23 mg/dL     Creatinine 1.77 (H) 0.76 - 1.27 mg/dL    Sodium 139 136 - 145 mmol/L    Potassium 4.5 3.5 - 5.2 mmol/L    Chloride 104 98 - 107 mmol/L    CO2 24.0 22.0 - 29.0 mmol/L    Calcium 9.1 8.6 - 10.5 mg/dL    Total Protein 7.5 6.0 - 8.5 g/dL    Albumin 3.3 (L) 3.5 - 5.2 g/dL    ALT (SGPT) 20 1 - 41 U/L    AST (SGOT) 13 1 - 40 U/L    Alkaline Phosphatase 150 (H) 39 - 117 U/L    Total Bilirubin 0.3 0.0 - 1.2 mg/dL    Globulin 4.2 gm/dL    A/G Ratio 0.8 g/dL    BUN/Creatinine Ratio 20.3 7.0 - 25.0    Anion Gap 11.0 5.0 - 15.0 mmol/L    eGFR 37.9 (L) >60.0 mL/min/1.73   CBC Auto Differential    Collection Time: 05/15/24 10:26 AM    Specimen: Blood   Result Value Ref Range    WBC 14.00 (H) 3.40 - 10.80 10*3/mm3    RBC 4.21 4.14 - 5.80 10*6/mm3    Hemoglobin 11.1 (L) 13.0 - 17.7 g/dL    Hematocrit 35.9 (L) 37.5 - 51.0 %    MCV 85.3 79.0 - 97.0 fL    MCH 26.4 (L) 26.6 - 33.0 pg    MCHC 30.9 (L) 31.5 - 35.7 g/dL    RDW 13.2 12.3 - 15.4 %    RDW-SD 41.2 37.0 - 54.0 fl    MPV 9.8 6.0 - 12.0 fL    Platelets 336 140 - 450 10*3/mm3    Neutrophil % 86.6 (H) 42.7 - 76.0 %    Lymphocyte % 5.4 (L) 19.6 - 45.3 %    Monocyte % 6.8 5.0 - 12.0 %    Eosinophil % 0.3 0.3 - 6.2 %    Basophil % 0.3 0.0 - 1.5 %    Immature Grans % 0.6 (H) 0.0 - 0.5 %    Neutrophils, Absolute 12.14 (H) 1.70 - 7.00 10*3/mm3    Lymphocytes, Absolute 0.75 0.70 - 3.10 10*3/mm3    Monocytes, Absolute 0.95 (H) 0.10 - 0.90 10*3/mm3    Eosinophils, Absolute 0.04 0.00 - 0.40 10*3/mm3    Basophils, Absolute 0.04 0.00 - 0.20 10*3/mm3    Immature Grans, Absolute 0.08 (H) 0.00 - 0.05 10*3/mm3    nRBC 0.0 0.0 - 0.2 /100 WBC   Lactic Acid, Plasma    Collection Time: 05/15/24 10:26 AM    Specimen: Blood   Result Value Ref Range    Lactate 0.9 0.5 - 2.0 mmol/L   Beta Hydroxybutyrate Quantitative    Collection Time: 05/15/24 10:26 AM    Specimen: Blood   Result Value Ref Range    Beta-Hydroxybutyrate Quant 0.409 (H) 0.020 - 0.270 mmol/L   Blood Gas, Venous -    Collection Time:  05/15/24 11:30 AM    Specimen: Venous Blood   Result Value Ref Range    pH, Venous 7.434 (H) 7.310 - 7.410 pH Units    pCO2, Venous 36.1 (L) 41.0 - 51.0 mm Hg    pO2, Venous 36.2 35.0 - 45.0 mm Hg    HCO3, Venous 24.2 22.0 - 28.0 mmol/L    Base Excess, Venous 0.1 -2.0 - 2.0 mmol/L    O2 Saturation, Venous 71.7 45.0 - 75.0 %    CO2 Content 25.3 23 - 27 mmol/L    Barometric Pressure for Blood Gas 740.5000 mmHg    Modality Room Air     Rate 18 Breaths/minute    Device Comment [id number]    POC Glucose Once    Collection Time: 05/15/24  4:06 PM    Specimen: Blood   Result Value Ref Range    Glucose 319 (H) 70 - 130 mg/dL       Ordered the above labs and independently reviewed the results.        RADIOLOGY  CT Head Without Contrast    Result Date: 5/15/2024  CT HEAD WITHOUT CONTRAST  HISTORY: Weakness, unsteady gait, confusion.  COMPARISON: CT head 10/31/2021.  FINDINGS: The brain and ventricles are symmetrical. There is no evidence of hemorrhage, hydrocephalus or of abnormal extra-axial fluid. No focal area of decreased attenuation to suggest acute infarction is identified. Further evaluation could be performed with a MRI examination of the brain as indicated.  Radiation dose reduction techniques were utilized, including automated exposure control and exposure modulation based on body size.   This report was finalized on 5/15/2024 3:26 PM by Dr. Mata Riddle M.D on Workstation: [id number]      XR Chest 1 View    Result Date: 5/15/2024  XR CHEST 1 VW-  Clinical: Lethargy  COMPARISON examination 10/31/2021  FINDINGS: Cardiac size within normal limits, no mediastinal or hilar abnormality. The lungs are clear. No effusion or edema seen.  CONCLUSION: No active disease of the chest  This report was finalized on 5/15/2024 10:58 AM by Dr. Kevin Ponce M.D on Workstation: [id number]       I ordered the above noted radiological studies. Reviewed by me and discussed with radiologist.  See dictation for official radiology  interpretation.      MEDICATIONS GIVEN IN ER    Medications   sodium chloride 0.9 % flush 10 mL (has no administration in time range)   sodium chloride 0.9 % flush 10 mL ( Intravenous Canceled Entry 5/15/24 1456)   sodium chloride 0.9 % flush 10 mL (has no administration in time range)   sodium chloride 0.9 % infusion 40 mL (has no administration in time range)   acetaminophen (TYLENOL) tablet 650 mg (has no administration in time range)     Or   acetaminophen (TYLENOL) 160 MG/5ML oral solution 650 mg (has no administration in time range)     Or   acetaminophen (TYLENOL) suppository 650 mg (has no administration in time range)   sennosides-docusate (PERICOLACE) 8.6-50 MG per tablet 2 tablet (has no administration in time range)     And   polyethylene glycol (MIRALAX) packet 17 g (has no administration in time range)     And   bisacodyl (DULCOLAX) EC tablet 5 mg (has no administration in time range)     And   bisacodyl (DULCOLAX) suppository 10 mg (has no administration in time range)   ondansetron ODT (ZOFRAN-ODT) disintegrating tablet 4 mg (has no administration in time range)     Or   ondansetron (ZOFRAN) injection 4 mg (has no administration in time range)   dextrose (GLUTOSE) oral gel 15 g (has no administration in time range)   dextrose (D50W) (25 g/50 mL) IV injection 25 g (has no administration in time range)   glucagon (GLUCAGEN) injection 1 mg (has no administration in time range)   insulin lispro (HUMALOG/ADMELOG) injection 2-9 Units (has no administration in time range)   insulin glargine (LANTUS, SEMGLEE) injection 20 Units (20 Units Subcutaneous Given 5/15/24 1510)   cefTRIAXone (ROCEPHIN) 1,000 mg in sodium chloride 0.9 % 100 mL MBP (has no administration in time range)   Enoxaparin Sodium (LOVENOX) syringe 40 mg (40 mg Subcutaneous Given 5/15/24 1510)   sodium chloride 0.9 % infusion (has no administration in time range)   levothyroxine (SYNTHROID, LEVOTHROID) tablet 50 mcg (has no administration in  time range)   pantoprazole (PROTONIX) EC tablet 40 mg (has no administration in time range)   lactobacillus acidophilus (RISAQUAD) capsule 1 capsule (has no administration in time range)   tamsulosin (FLOMAX) 24 hr capsule 0.4 mg (has no administration in time range)   cefTRIAXone (ROCEPHIN) 1,000 mg in sodium chloride 0.9 % 100 mL MBP (0 mg Intravenous Stopped 5/15/24 1300)   sodium chloride 0.9 % bolus 1,000 mL (0 mL Intravenous Stopped 5/15/24 1227)         ADDITIONAL ORDERS CONSIDERED BUT NOT ORDERED:  Nothing      PROGRESS, DATA ANALYSIS, CONSULTS, AND MEDICAL DECISION MAKING    All labs have been independently interpreted by myself.  All radiology studies have been independently interpreted by myself and discussed with radiologist dictating the report.   EKG's independently interpreted by myself.  Discussion below represents my analysis of pertinent findings related to patient's condition, differential diagnosis, treatment plan and final disposition.    I have discussed case with Dr. Christy, emergency room physician.  He has performed his own bedside examination and agrees with treatment plan.    ED Course as of 05/15/24 1657   Wed May 15, 2024   1019 Patient presents with 2 to 3-day history of confusion, altered mental status, generalized weakness.  Stable vitals.  Neurologically intact at this time.  Differential diagnoses include but not limited to acute UTI, DKA, electrolyte abnormality, brain metastases.    Patient is not a TNKase or TMD candidate given onset of symptoms.  NIH stroke scale 0. [EE]   1045 WBC(!): 14.00 [EE]   1045 CT imaging of the brain independently interpreted myself shows no evidence of obvious mass or hemorrhage. [EE]   1058 Lactate: 0.9 [EE]   1058 Appearance, UA(!): Turbid [EE]   1058 Leukocytes, UA(!): Large (3+) [EE]   1102 BUN(!): 36 [EE]   1102 Creatinine(!): 1.77 [EE]   1102 CO2: 24.0 [EE]   1102 Anion Gap: 11.0 [EE]   1127 WBC, UA(!): Too Numerous to Count [EE]   1128  Bacteria, UA(!): Unable to determine due to loaded field [EE]   1131 Updated patient and family on plan for admission.  They are in agreement. [EE]   1157 Karla [EE]   1218 pH, Venous(!): 7.434 [MM]   1218 pCO2, Venous(!): 36.1 [MM]   1218 Beta-Hydroxybutyrate Quant(!): 0.409 [MM]   1218 WBC(!): 14.00 [MM]   1218 Creatinine(!): 1.77 [MM]   1218 BUN(!): 36 [MM]   1218 Previous creatinine is 1.4. [MM]   1218 CO2: 24.0 [MM]   1219 Anion Gap: 11.0 [MM]   1219 WBC, UA(!): Too Numerous to Count [MM]      ED Course User Index  [EE] Ba Bain PA  [MM] Grant Christy MD       AS OF 16:57 EDT VITALS:    BP - 149/91  HR - 65  TEMP - 97.9 °F (36.6 °C) (Oral)  O2 SATS - 97%        DIAGNOSIS  Final diagnoses:   Acute UTI   Encephalopathy   Type 2 diabetes mellitus with other specified complication, unspecified whether long term insulin use         DISPOSITION  Admitted        Dictated utilizing Dragon dictation     Ba Bain PA  05/15/24 6719

## 2024-05-15 NOTE — ED NOTES
Nursing report ED to floor  Bennie Greene  83 y.o.  male    HPI :  HPI (Adult)  Stated Reason for Visit: hyperglycemia  History Obtained From: patient    Chief Complaint  Chief Complaint   Patient presents with    Hyperglycemia       Admitting doctor:   Francisco Acosta MD    Admitting diagnosis:   The primary encounter diagnosis was Acute UTI. Diagnoses of Encephalopathy and Type 2 diabetes mellitus with other specified complication, unspecified whether long term insulin use were also pertinent to this visit.    Code status:   Current Code Status       Date Active Code Status Order ID Comments User Context       Prior            Allergies:   Patient has no known allergies.    Isolation:   No active isolations    Intake and Output    Intake/Output Summary (Last 24 hours) at 5/15/2024 1230  Last data filed at 5/15/2024 1222  Gross per 24 hour   Intake 600 ml   Output --   Net 600 ml       Weight:   There were no vitals filed for this visit.    Most recent vitals:   Vitals:    05/15/24 1149 05/15/24 1158 05/15/24 1221 05/15/24 1227   BP:   159/82    BP Location:       Patient Position:       Pulse: 68 66 78 67   Resp:       Temp:       TempSrc:       SpO2: 93% 96% 95% 96%       Active LDAs/IV Access:   Lines, Drains & Airways       Active LDAs       Name Placement date Placement time Site Days    Peripheral IV 05/15/24 1025 Anterior;Distal;Right;Upper Arm 05/15/24  1025  Arm  less than 1                    Labs (abnormal labs have a star):   Labs Reviewed   COMPREHENSIVE METABOLIC PANEL - Abnormal; Notable for the following components:       Result Value    Glucose 326 (*)     BUN 36 (*)     Creatinine 1.77 (*)     Albumin 3.3 (*)     Alkaline Phosphatase 150 (*)     eGFR 37.9 (*)     All other components within normal limits    Narrative:     GFR Normal >60  Chronic Kidney Disease <60  Kidney Failure <15    The GFR formula is only valid for adults with stable renal function between ages 18 and 70.   URINALYSIS W/  MICROSCOPIC IF INDICATED (NO CULTURE) - Abnormal; Notable for the following components:    Appearance, UA Turbid (*)     Glucose, UA >=1000 mg/dL (3+) (*)     Ketones, UA Trace (*)     Blood, UA Large (3+) (*)     Protein,  mg/dL (2+) (*)     Leuk Esterase, UA Large (3+) (*)     All other components within normal limits   CBC WITH AUTO DIFFERENTIAL - Abnormal; Notable for the following components:    WBC 14.00 (*)     Hemoglobin 11.1 (*)     Hematocrit 35.9 (*)     MCH 26.4 (*)     MCHC 30.9 (*)     Neutrophil % 86.6 (*)     Lymphocyte % 5.4 (*)     Immature Grans % 0.6 (*)     Neutrophils, Absolute 12.14 (*)     Monocytes, Absolute 0.95 (*)     Immature Grans, Absolute 0.08 (*)     All other components within normal limits   BETA HYDROXYBUTYRATE QUANTITATIVE - Abnormal; Notable for the following components:    Beta-Hydroxybutyrate Quant 0.409 (*)     All other components within normal limits    Narrative:     In the assessment of possible diabetic ketoacidosis, the test should be interpreted along with other clinical and laboratory findings.  A level greater than 1 mmol/L should require further evaluation and levels of more than 3 mmol/L require immediate medical review.   URINALYSIS, MICROSCOPIC ONLY - Abnormal; Notable for the following components:    RBC, UA Unable to determine due to loaded field (*)     WBC, UA Too Numerous to Count (*)     Bacteria, UA Unable to determine due to loaded field (*)     Squamous Epithelial Cells, UA Unable to determine due to loaded field (*)     All other components within normal limits   BLOOD GAS, VENOUS - Abnormal; Notable for the following components:    pH, Venous 7.434 (*)     pCO2, Venous 36.1 (*)     All other components within normal limits   LACTIC ACID, PLASMA - Normal   BLOOD CULTURE   BLOOD CULTURE   BLOOD GAS, VENOUS   URINALYSIS W/ CULTURE IF INDICATED   POCT GLUCOSE FINGERSTICK   CBC AND DIFFERENTIAL    Narrative:     The following orders were created for  panel order CBC & Differential.  Procedure                               Abnormality         Status                     ---------                               -----------         ------                     CBC Auto Differential[[id number]]        Abnormal            Final result                 Please view results for these tests on the individual orders.   KETONE BODIES SERUM    Narrative:     The following orders were created for panel order Ketone Bodies, Serum (Not performed at [facility]).  Procedure                               Abnormality         Status                     ---------                               -----------         ------                     Beta Hydroxybutyrate Alex...[[id number]]  Abnormal            Final result                 Please view results for these tests on the individual orders.       EKG:   Telemetry Scan   Final Result      Telemetry Scan   Final Result      Telemetry Scan   Final Result          Meds given in ED:   Medications   sodium chloride 0.9 % flush 10 mL (has no administration in time range)   cefTRIAXone (ROCEPHIN) 1,000 mg in sodium chloride 0.9 % 100 mL MBP (1,000 mg Intravenous New Bag 5/15/24 1222)   sodium chloride 0.9 % bolus 1,000 mL (1,000 mL Intravenous New Bag 5/15/24 1157)       Imaging results:  No radiology results for the last day    Ambulatory status:   - up with assist    Social issues:   Social History     Socioeconomic History    Marital status:    Tobacco Use    Smoking status: Former     Current packs/day: 0.00     Average packs/day: 1 pack/day for 45.0 years (45.0 ttl pk-yrs)     Types: Cigarettes     Start date: 1959     Quit date:      Years since quittin.3    Smokeless tobacco: Never   Vaping Use    Vaping status: Never Used   Substance and Sexual Activity    Alcohol use: No    Drug use: No    Sexual activity: Not Currently       Peripheral Neurovascular  Peripheral Neurovascular (Adult)  Peripheral Neurovascular WDL:  WDL    Neuro Cognitive  Neuro Cognitive (Adult)  Cognitive/Neuro/Behavioral WDL: WDL  Additional Documentation: NIH Stroke Scale (group)  NIH Stroke Scale  1a. Level of Consciousness: 0-->Alert, keenly responsive  1b. LOC Questions: 0-->Answers both questions correctly  1c. LOC Commands: 0-->Performs both tasks correctly  2. Best Gaze: 0-->Normal  3. Visual: 0-->No visual loss  4. Facial Palsy: 0-->Normal symmetrical movements  5a. Motor Arm, Left: 0-->No drift, limb holds 90 (or 45) degrees for full 10 secs  5b. Motor Arm, Right: 0-->No drift, limb holds 90 (or 45) degrees for full 10 secs  6a. Motor Leg, Left: 0-->No drift, leg holds 30 degree position for full 5 secs  6b. Motor Leg, Right: 0-->No drift, leg holds 30 degree position for full 5 secs  7. Limb Ataxia: 0-->Absent  8. Sensory: 0-->Normal, no sensory loss  9. Best Language: 0-->No aphasia, normal  10. Dysarthria: 0-->Normal  11. Extinction and Inattention (formerly Neglect): 0-->No abnormality  Total (NIH Stroke Scale): 0    Learning  Learning Assessment (Adult)  Learning Readiness and Ability: no barriers identified  Education Provided  Person Taught: patient, spouse  Teaching Method: verbal instruction  Teaching Focus: symptom/problem overview, diagnostic test, medical device/equipment use, medication administration  Education Outcome Evaluation: acceptance expressed    Respiratory  Respiratory (Adult)  Airway WDL: WDL  Respiratory WDL  Respiratory WDL: WDL    Abdominal Pain       Pain Assessments  Pain (Adult)  (0-10) Pain Rating: Rest: 0  (0-10) Pain Rating: Activity: 0    NIH Stroke Scale  NIH Stroke Scale  1a. Level of Consciousness: 0-->Alert, keenly responsive  1b. LOC Questions: 0-->Answers both questions correctly  1c. LOC Commands: 0-->Performs both tasks correctly  2. Best Gaze: 0-->Normal  3. Visual: 0-->No visual loss  4. Facial Palsy: 0-->Normal symmetrical movements  5a. Motor Arm, Left: 0-->No drift, limb holds 90 (or 45) degrees for  full 10 secs  5b. Motor Arm, Right: 0-->No drift, limb holds 90 (or 45) degrees for full 10 secs  6a. Motor Leg, Left: 0-->No drift, leg holds 30 degree position for full 5 secs  6b. Motor Leg, Right: 0-->No drift, leg holds 30 degree position for full 5 secs  7. Limb Ataxia: 0-->Absent  8. Sensory: 0-->Normal, no sensory loss  9. Best Language: 0-->No aphasia, normal  10. Dysarthria: 0-->Normal  11. Extinction and Inattention (formerly Neglect): 0-->No abnormality  Total (NIH Stroke Scale): 0    Dee Rich RN  05/15/24 12:30 EDT

## 2024-05-16 ENCOUNTER — APPOINTMENT (OUTPATIENT)
Dept: CT IMAGING | Facility: HOSPITAL | Age: 84
End: 2024-05-16
Payer: MEDICARE

## 2024-05-16 LAB
ALBUMIN SERPL-MCNC: 2.6 G/DL (ref 3.5–5.2)
ALBUMIN/GLOB SERPL: 0.7 G/DL
ALP SERPL-CCNC: 121 U/L (ref 39–117)
ALT SERPL W P-5'-P-CCNC: 21 U/L (ref 1–41)
ANION GAP SERPL CALCULATED.3IONS-SCNC: 8.8 MMOL/L (ref 5–15)
AST SERPL-CCNC: 21 U/L (ref 1–40)
BACTERIA SPEC AEROBE CULT: NORMAL
BASOPHILS # BLD AUTO: 0.03 10*3/MM3 (ref 0–0.2)
BASOPHILS NFR BLD AUTO: 0.3 % (ref 0–1.5)
BILIRUB SERPL-MCNC: 0.3 MG/DL (ref 0–1.2)
BUN SERPL-MCNC: 29 MG/DL (ref 8–23)
BUN/CREAT SERPL: 17.6 (ref 7–25)
CALCIUM SPEC-SCNC: 8.6 MG/DL (ref 8.6–10.5)
CHLORIDE SERPL-SCNC: 111 MMOL/L (ref 98–107)
CO2 SERPL-SCNC: 21.2 MMOL/L (ref 22–29)
CREAT SERPL-MCNC: 1.65 MG/DL (ref 0.76–1.27)
DEPRECATED RDW RBC AUTO: 39 FL (ref 37–54)
EGFRCR SERPLBLD CKD-EPI 2021: 41.2 ML/MIN/1.73
EOSINOPHIL # BLD AUTO: 0.13 10*3/MM3 (ref 0–0.4)
EOSINOPHIL NFR BLD AUTO: 1.1 % (ref 0.3–6.2)
ERYTHROCYTE [DISTWIDTH] IN BLOOD BY AUTOMATED COUNT: 13.4 % (ref 12.3–15.4)
GGT SERPL-CCNC: 26 U/L (ref 8–61)
GLOBULIN UR ELPH-MCNC: 3.9 GM/DL
GLUCOSE BLDC GLUCOMTR-MCNC: 188 MG/DL (ref 70–130)
GLUCOSE BLDC GLUCOMTR-MCNC: 266 MG/DL (ref 70–130)
GLUCOSE BLDC GLUCOMTR-MCNC: 299 MG/DL (ref 70–130)
GLUCOSE BLDC GLUCOMTR-MCNC: 70 MG/DL (ref 70–130)
GLUCOSE SERPL-MCNC: 66 MG/DL (ref 65–99)
HBA1C MFR BLD: 10.5 % (ref 4.8–5.6)
HCT VFR BLD AUTO: 30 % (ref 37.5–51)
HGB BLD-MCNC: 9.7 G/DL (ref 13–17.7)
IMM GRANULOCYTES # BLD AUTO: 0.12 10*3/MM3 (ref 0–0.05)
IMM GRANULOCYTES NFR BLD AUTO: 1 % (ref 0–0.5)
LYMPHOCYTES # BLD AUTO: 1.15 10*3/MM3 (ref 0.7–3.1)
LYMPHOCYTES NFR BLD AUTO: 9.7 % (ref 19.6–45.3)
MCH RBC QN AUTO: 26.4 PG (ref 26.6–33)
MCHC RBC AUTO-ENTMCNC: 32.3 G/DL (ref 31.5–35.7)
MCV RBC AUTO: 81.5 FL (ref 79–97)
MONOCYTES # BLD AUTO: 0.98 10*3/MM3 (ref 0.1–0.9)
MONOCYTES NFR BLD AUTO: 8.3 % (ref 5–12)
NEUTROPHILS NFR BLD AUTO: 79.6 % (ref 42.7–76)
NEUTROPHILS NFR BLD AUTO: 9.41 10*3/MM3 (ref 1.7–7)
NRBC BLD AUTO-RTO: 0 /100 WBC (ref 0–0.2)
PLATELET # BLD AUTO: 334 10*3/MM3 (ref 140–450)
PMV BLD AUTO: 9.5 FL (ref 6–12)
POTASSIUM SERPL-SCNC: 3.8 MMOL/L (ref 3.5–5.2)
PROT SERPL-MCNC: 6.5 G/DL (ref 6–8.5)
RBC # BLD AUTO: 3.68 10*6/MM3 (ref 4.14–5.8)
SODIUM SERPL-SCNC: 141 MMOL/L (ref 136–145)
WBC NRBC COR # BLD AUTO: 11.82 10*3/MM3 (ref 3.4–10.8)

## 2024-05-16 PROCEDURE — 25010000002 ENOXAPARIN PER 10 MG: Performed by: INTERNAL MEDICINE

## 2024-05-16 PROCEDURE — 25010000002 CEFTRIAXONE PER 250 MG: Performed by: INTERNAL MEDICINE

## 2024-05-16 PROCEDURE — 82948 REAGENT STRIP/BLOOD GLUCOSE: CPT

## 2024-05-16 PROCEDURE — 74176 CT ABD & PELVIS W/O CONTRAST: CPT

## 2024-05-16 PROCEDURE — 97165 OT EVAL LOW COMPLEX 30 MIN: CPT

## 2024-05-16 PROCEDURE — 85025 COMPLETE CBC W/AUTO DIFF WBC: CPT | Performed by: INTERNAL MEDICINE

## 2024-05-16 PROCEDURE — 63710000001 INSULIN LISPRO (HUMAN) PER 5 UNITS: Performed by: INTERNAL MEDICINE

## 2024-05-16 PROCEDURE — 36415 COLL VENOUS BLD VENIPUNCTURE: CPT | Performed by: INTERNAL MEDICINE

## 2024-05-16 PROCEDURE — 83036 HEMOGLOBIN GLYCOSYLATED A1C: CPT | Performed by: INTERNAL MEDICINE

## 2024-05-16 PROCEDURE — 80053 COMPREHEN METABOLIC PANEL: CPT | Performed by: INTERNAL MEDICINE

## 2024-05-16 PROCEDURE — 82977 ASSAY OF GGT: CPT | Performed by: INTERNAL MEDICINE

## 2024-05-16 PROCEDURE — 97535 SELF CARE MNGMENT TRAINING: CPT

## 2024-05-16 RX ORDER — HYDROCODONE BITARTRATE AND ACETAMINOPHEN 5; 325 MG/1; MG/1
1 TABLET ORAL EVERY 4 HOURS PRN
Status: DISCONTINUED | OUTPATIENT
Start: 2024-05-16 | End: 2024-05-20 | Stop reason: HOSPADM

## 2024-05-16 RX ADMIN — HYDROCODONE BITARTRATE AND ACETAMINOPHEN 1 TABLET: 5; 325 TABLET ORAL at 12:35

## 2024-05-16 RX ADMIN — TAMSULOSIN HYDROCHLORIDE 0.4 MG: 0.4 CAPSULE ORAL at 08:40

## 2024-05-16 RX ADMIN — Medication 10 ML: at 08:43

## 2024-05-16 RX ADMIN — Medication 1 CAPSULE: at 08:40

## 2024-05-16 RX ADMIN — PANTOPRAZOLE SODIUM 40 MG: 40 TABLET, DELAYED RELEASE ORAL at 08:40

## 2024-05-16 RX ADMIN — LEVOTHYROXINE SODIUM 50 MCG: 50 TABLET ORAL at 08:40

## 2024-05-16 RX ADMIN — ACETAMINOPHEN 325MG 650 MG: 325 TABLET ORAL at 11:31

## 2024-05-16 RX ADMIN — HYDROCODONE BITARTRATE AND ACETAMINOPHEN 1 TABLET: 5; 325 TABLET ORAL at 17:29

## 2024-05-16 RX ADMIN — CEFTRIAXONE SODIUM 1000 MG: 1 INJECTION, POWDER, FOR SOLUTION INTRAMUSCULAR; INTRAVENOUS at 11:35

## 2024-05-16 RX ADMIN — INSULIN LISPRO 6 UNITS: 100 INJECTION, SOLUTION INTRAVENOUS; SUBCUTANEOUS at 22:06

## 2024-05-16 RX ADMIN — Medication 10 ML: at 22:06

## 2024-05-16 RX ADMIN — INSULIN LISPRO 2 UNITS: 100 INJECTION, SOLUTION INTRAVENOUS; SUBCUTANEOUS at 11:35

## 2024-05-16 RX ADMIN — HYDROCODONE BITARTRATE AND ACETAMINOPHEN 1 TABLET: 5; 325 TABLET ORAL at 22:06

## 2024-05-16 NOTE — PLAN OF CARE
Goal Outcome Evaluation:  Plan of Care Reviewed With: patient        Progress: improving  Outcome Evaluation: Patient has been A&O this shift. No issues. No complaints voiced, WCTM.

## 2024-05-16 NOTE — THERAPY EVALUATION
Patient Name: Bennie Greene  : 1940    MRN: [id number]                              Today's Date: 2024       Admit Date: 5/15/2024    Visit Dx:     ICD-10-CM ICD-9-CM   1. Acute UTI  N39.0 599.0   2. Encephalopathy  G93.40 348.30   3. Type 2 diabetes mellitus with other specified complication, unspecified whether long term insulin use  E11.69 250.80     Patient Active Problem List   Diagnosis    Type 2 diabetes mellitus with hyperglycemia, with long-term current use of insulin    Personal history of malignant neoplasm of prostate    Other hyperlipidemia    Acquired hypothyroidism    Syncope and collapse    C4 cervical fracture    Thumb fracture    Gastroparesis    Gastroesophageal reflux disease without esophagitis    Pain of upper abdomen    Nausea    Heartburn    History of Helicobacter pylori infection    History of gastrectomy    Chronic right hip pain    Acute UTI    Bladder cancer     Past Medical History:   Diagnosis Date    Bladder cancer     Diabetes mellitus     Dyspepsia     Erectile dysfunction     Gastritis 2014    Marked gastritis by EGD- Dr. BRAXTON Garcia; H. Pylori +    Gastroparesis     s/p gastrectomy    GERD (gastroesophageal reflux disease)     Hypothyroidism     Other hyperlipidemia 2020    Personal history of malignant neoplasm of prostate     2006 s/p XRT    Pes cavus     Prostate cancer     and radiation    SCC (squamous cell carcinoma)     scalp/ neck    Type 2 diabetes mellitus      Past Surgical History:   Procedure Laterality Date    CHOLECYSTECTOMY  2014    Dr. Ton Killian    COLONOSCOPY  10/2014    Dr. Killian    PARTIAL GASTRECTOMY  2014    Dr. Killian; for gastric outlet obstruction    UPPER GASTROINTESTINAL ENDOSCOPY        General Information       Row Name 24 0836          OT Time and Intention    Document Type evaluation  -PP     Mode of Treatment individual therapy;occupational therapy  -PP       Row Name 24 0836          General  Information    Patient Profile Reviewed yes  -PP     Prior Level of Function independent:;ADL's;all household mobility;community mobility  -PP     Barriers to Rehab none identified  -PP       Row Name 05/16/24 0836          Living Environment    People in Home spouse  -PP       Row Name 05/16/24 08          Home Main Entrance    Number of Stairs, Main Entrance two;three  -PP     Stair Railings, Main Entrance railings safe and in good condition  -PP       Row Name 05/16/24 08          Cognition    Orientation Status (Cognition) oriented x 4  -PP       Row Name 05/16/24 08          Safety Issues, Functional Mobility    Comment, Safety Issues/Impairments (Mobility) gait belt and non skid socks worn for safety  -PP               User Key  (r) = Recorded By, (t) = Taken By, (c) = Cosigned By      Initials Name Provider Type    PP Laura Liang, OT Occupational Therapist                     Mobility/ADL's       Row Name 05/16/24 08          Bed Mobility    Bed Mobility supine-sit;sit-supine  -PP     Supine-Sit [place] (Bed Mobility) modified independence  -PP     Sit-Supine [place] (Bed Mobility) modified independence  -PP     Comment, (Bed Mobility) Pt requests some assist to manage IV pole when getting up, but otherwise does not req assist  -PP       Row Name 05/16/24 08          Transfers    Transfers sit-stand transfer;stand-sit transfer;toilet transfer  -PP     Comment, (Transfers) No AD used during fxnl xfers, and no LOB noted  -PP       Row Name 05/16/24 08          Sit-Stand Transfer    Sit-Stand [place] (Transfers) modified independence  -PP     Assistive Device (Sit-Stand Transfers) other (see comments)  No Ad  -PP     Comment, (Sit-Stand Transfer) 1x from EOB/ commode  -PP       Row Name 05/16/24 08          Toilet Transfer    Type (Toilet Transfer) sit-stand;stand-sit  -PP     [place] Level (Toilet Transfer) supervision  -PP     Assistive Device (Toilet Transfer) other  (see comments)  -PP       Row Name 05/16/24 08          Functional Mobility    Functional Mobility- Ind. Level supervision required;standby assist  -PP     Functional Mobility- Device other (see comments)  No AD  -PP     Functional Mobility- Comment Pt able to ambulate around room and to/from BR w/ SV-SBA for managing the IV pole, and no AD needed.  -PP       Row Name 05/16/24 08          Activities of Daily Living    BADL Assessment/Intervention lower body dressing;grooming;feeding;toileting  -PP       Row Name 05/16/24 08          Lower Body Dressing Assessment/Training    [place] Level (Lower Body Dressing) don;doff;socks;set up  -PP     Position (Lower Body Dressing) edge of bed sitting  -PP       Row Name 05/16/24 08          Grooming Assessment/Training    [place] (Grooming) grooming skills;set up  -PP     Position (Grooming) sink side;unsupported standing  -PP       Row Name 05/16/24 08          Self-Feeding Assessment/Training    [place] (Feeding) feeding skills;independent  -PP       Row Name 05/16/24 08          Toileting Assessment/Training    [place] (Toileting) toileting skills;set up;adjust/manage clothing;perform perineal hygiene  -PP     Assistive Devices (Toileting) urinal  -PP     Position (Toileting) unsupported standing  -PP     Comment, (Toileting) Pt noted to use urinal standing bedside w/o assist to increase safety but reqs S/u assist when mobilizing to/from BR d/t IV pole.  -PP               User Key  (r) = Recorded By, (t) = Taken By, (c) = Cosigned By      Initials Name Provider Type    PP Laura Liang OT Occupational Therapist                   Obj/Interventions       Row Name 05/16/24 0845          Sensory Assessment (Somatosensory)    Sensory Assessment (Somatosensory) UE sensation intact  -PP       Row Name 05/16/24 0845          Vision Assessment/Intervention    Visual Impairment/Limitations WFL  -PP       Row Name 05/16/24 08           Range of Motion Comprehensive    General Range of Motion no range of motion deficits identified  -PP       Row Name 05/16/24 0845          Strength Comprehensive (MMT)    General Manual Muscle Testing (MMT) Assessment no strength deficits identified  -PP     Comment, General Manual Muscle Testing (MMT) Assessment Pt BUE grossly 4+/5  -PP       Row Name 05/16/24 0845          Motor Skills    Motor Skills functional endurance  -PP     Functional Endurance fair +  -PP       Row Name 05/16/24 0845          Balance    Balance Assessment sitting static balance;standing static balance;standing dynamic balance  -PP     Static Sitting Balance independent  -PP     Position, Sitting Balance sitting edge of bed  -PP     Static Standing Balance supervision  -PP     Dynamic Standing Balance standby assist;supervision  -PP     Position/Device Used, Standing Balance unsupported  -PP     Balance Interventions sitting;standing;occupation based/functional task  -PP               User Key  (r) = Recorded By, (t) = Taken By, (c) = Cosigned By      Initials Name Provider Type    PP Laura Liang, OT Occupational Therapist                   Goals/Plan       Row Name 05/16/24 0857          Problem Specific Goal 1 (OT)    Problem Specific Goal 1 (OT) Pt SV-SBA for fxnl mob to/from BR and S/u for toileting w/o AD.  -PP     Time Frame (Problem Specific Goal 1, OT) short term goal (STG);1 day  -PP     Progress/Outcome (Problem Specific Goal 1, OT) goal met  -PP               User Key  (r) = Recorded By, (t) = Taken By, (c) = Cosigned By      Initials Name Provider Type    PP Laura Liang, OT Occupational Therapist                   Clinical Impression       Row Name 05/16/24 0856          Pain Assessment    Pretreatment Pain Rating 0/10 - no pain  -PP     Posttreatment Pain Rating 0/10 - no pain  -PP       Row Name 05/16/24 0856          Plan of Care Review    Plan of Care Reviewed With patient  -PP     Progress improving   -PP     Outcome Evaluation Pt is a 82 y/o male with a PMHx of DM 2, and new onset of bladder cancer. Pt was admitted to [facility] on 5/15 for UTI and confusion. Pt presents to be improving since admission and is A&O x4 today. He was observed standing at bedside using urinal w/o assist. He performs bed mob MOD I and STS Mod I from EOB/commode. He was able to ambulate around room and to/from BR w/ SV-SBA for managing the IV pole, and no AD needed. He is S/u for LBD EOB and G&H standing at sink. Pt anticipated to dc home once DM 2 issues are addressed. No further skilled OT needs indicated and OT will s/o at this time. Reorder if anything changes.  -PP       Row Name 05/16/24 0856          Therapy Assessment/Plan (OT)    Rehab Potential (OT) good, to achieve stated therapy goals  -PP     Criteria for Skilled Therapeutic Interventions Met (OT) no;no problems identified which require skilled intervention  -PP       Row Name 05/16/24 0856          Therapy Plan Review/Discharge Plan (OT)    Anticipated Discharge Disposition (OT) home with assist;home  -PP       Row Name 05/16/24 0856          Vital Signs    O2 Delivery Pre Treatment room air  -PP     Pre Patient Position Supine  -PP     Intra Patient Position Standing  -PP     Post Patient Position Supine  -PP       Row Name 05/16/24 0856          Positioning and Restraints    Pre-Treatment Position in bed  -PP     Post Treatment Position bed  -PP     In Bed notified nsg;call light within reach;encouraged to call for assist;fowlers  exit alarm off at pt request, Nsg aware  -PP               User Key  (r) = Recorded By, (t) = Taken By, (c) = Cosigned By      Initials Name Provider Type    PP Laura Liang OT Occupational Therapist                   Outcome Measures       Row Name 05/16/24 0858          How much help from another is currently needed...    Putting on and taking off regular lower body clothing? 3  -PP     Bathing (including washing, rinsing, and drying) 3  -PP      Toileting (which includes using toilet bed pan or urinal) 4  -PP     Putting on and taking off regular upper body clothing 4  -PP     Taking care of personal grooming (such as brushing teeth) 4  -PP     Eating meals 4  -PP     AM-PAC 6 Clicks Score (OT) 22  -       Row Name 05/16/24 0020          How much help from another person do you currently need...    Turning from your back to your side while in flat bed without using bedrails? 4  -NH     Moving from lying on back to sitting on the side of a flat bed without bedrails? 4  -NH     Moving to and from a bed to a chair (including a wheelchair)? 4  -NH     Standing up from a chair using your arms (e.g., wheelchair, bedside chair)? 4  -NH     Climbing 3-5 steps with a railing? 3  -NH     To walk in hospital room? 4  -NH     AM-PAC 6 Clicks Score (PT) 23  -NH     Highest Level of Mobility Goal 7 --> Walk 25 feet or more  -NH       Row Name 05/16/24 0858          Modified [place] Scale    Modified [place] Scale 1 - No significant disability despite symptoms.  Able to carry out all usual duties and activities.  -       Row Name 05/16/24 0858          Functional Assessment    Outcome Measure Options AM-PAC 6 Clicks Daily Activity (OT);Modified [place]  -PP               User Key  (r) = Recorded By, (t) = Taken By, (c) = Cosigned By      Initials Name Provider Type    NH Marisol Bruner RN Registered Nurse    Laura Cortez OT Occupational Therapist                      OT Recommendation and Plan     Plan of Care Review  Plan of Care Reviewed With: patient  Progress: improving  Outcome Evaluation: Pt is a 82 y/o male with a PMHx of DM 2, and new onset of bladder cancer. Pt was admitted to [facility] on 5/15 for UTI and confusion. Pt presents to be improving since admission and is A&O x4 today. He was observed standing at bedside using urinal w/o assist. He performs bed mob MOD I and STS Mod I from EOB/commode. He was able to ambulate around room and to/from BR w/  SV-SBA for managing the IV pole, and no AD needed. He is S/u for LBD EOB and G&H standing at sink. Pt anticipated to dc home once DM 2 issues are addressed. No further skilled OT needs indicated and OT will s/o at this time. Reorder if anything changes.     Time Calculation:   Evaluation Complexity (OT)  Review Occupational Profile/Medical/Therapy History Complexity: brief/low complexity  Assessment, Occupational Performance/Identification of Deficit Complexity: 1-3 performance deficits  Clinical Decision Making Complexity (OT): problem focused assessment/low complexity  Overall Complexity of Evaluation (OT): low complexity     Time Calculation- OT       Row Name 05/16/24 0859             Time Calculation- OT    OT Start Time 0812  -PP      OT Stop Time 0827  -PP      OT Time Calculation (min) 15 min  -PP      Total Timed Code Minutes- OT 8 minute(s)  -PP      OT Received On 05/16/24  -PP         Timed Charges    38513 - OT Self Care/Mgmt Minutes 8  -PP         Untimed Charges    OT Eval/Re-eval Minutes 7  -PP         Total Minutes    Timed Charges Total Minutes 8  -PP      Untimed Charges Total Minutes 7  -PP       Total Minutes 15  -PP                User Key  (r) = Recorded By, (t) = Taken By, (c) = Cosigned By      Initials Name Provider Type    PP Ward, Laura, OT Occupational Therapist                  Therapy Charges for Today       Code Description Service Date Service Provider Modifiers Qty    55359065692 HC OT SELF CARE/MGMT/TRAIN EA 15 MIN 5/16/2024 WardJose hannahshia, OT GO 1    99961902912 HC OT EVAL LOW COMPLEXITY 2 5/16/2024 WardJoseshia, OT GO 1                 Porshia Ward, OT  5/16/2024

## 2024-05-16 NOTE — PLAN OF CARE
Goal Outcome Evaluation:  Plan of Care Reviewed With: patient        Progress: improving  Outcome Evaluation: Pt is a 84 y/o male with a PMHx of DM 2, and new onset of bladder cancer. Pt was admitted to [facility] on 5/15 for UTI and confusion. Pt presents to be improving since admission and is A&O x4 today. He was observed standing at bedside using urinal w/o assist. He performs bed mob MOD I and STS Mod I from EOB/commode. He was able to ambulate around room and to/from BR w/ SV-SBA for managing the IV pole, and no AD needed. He is S/u for LBD EOB and G&H standing at sink. Pt anticipated to dc home once DM 2 issues are addressed. No further skilled OT needs indicated and OT will s/o at this time. Reorder if anything changes.      Anticipated Discharge Disposition (OT): home with assist, home

## 2024-05-16 NOTE — PROGRESS NOTES
Name: Bennie Greene ADMIT: 5/15/2024   : 1940  PCP: Travon Shah MD    MRN: [id number] LOS: 1 days   AGE/SEX: 83 y.o. male  ROOM: [facility]     Subjective   Subjective   Chief Complaint   Patient presents with    Hyperglycemia     No CP SOA NVD. Has developed RLQ, suprapubic pain.     Objective   Objective   Vital Signs  Temp:  [97.9 °F (36.6 °C)-99.3 °F (37.4 °C)] 97.9 °F (36.6 °C)  Heart Rate:  [61-78] 70  Resp:  [15-18] 18  BP: (139-159)/(69-91) 139/69  SpO2:  [95 %-97 %] 97 %  on   ;   Device (Oxygen Therapy): room air  Body mass index is 23.12 kg/m².    Physical Exam  Vitals and nursing note reviewed.   Constitutional:       General: He is not in acute distress.  HENT:      Head: Atraumatic.   Cardiovascular:      Rate and Rhythm: Normal rate and regular rhythm.      Pulses: Normal pulses.   Pulmonary:      Effort: Pulmonary effort is normal.      Breath sounds: No wheezing.   Abdominal:      General: There is no distension.      Palpations: Abdomen is soft.      Tenderness: There is abdominal tenderness. There is no guarding or rebound.   Musculoskeletal:         General: No tenderness.   Skin:     General: Skin is warm and dry.   Neurological:      Mental Status: He is alert. Mental status is at baseline.   Psychiatric:         Mood and Affect: Mood normal.         Behavior: Behavior normal.       Results Review  I reviewed the patient's new clinical results.    Results from last 7 days   Lab Units 24  0441 05/15/24  1026   WBC 10*3/mm3 11.82* 14.00*   HEMOGLOBIN g/dL 9.7* 11.1*   PLATELETS 10*3/mm3 334 336     Results from last 7 days   Lab Units 24  0441 05/15/24  1026   SODIUM mmol/L 141 139   POTASSIUM mmol/L 3.8 4.5   CHLORIDE mmol/L 111* 104   CO2 mmol/L 21.2* 24.0   BUN mg/dL 29* 36*   CREATININE mg/dL 1.65* 1.77*   GLUCOSE mg/dL 66 326*   EGFR mL/min/1.73 41.2* 37.9*     Results from last 7 days   Lab Units 24  0441 05/15/24  1026   ALBUMIN g/dL 2.6* 3.3*   BILIRUBIN  mg/dL 0.3 0.3   ALK PHOS U/L 121* 150*   AST (SGOT) U/L 21 13   ALT (SGPT) U/L 21 20     Results from last 7 days   Lab Units 05/16/24  0441 05/15/24  1026   CALCIUM mg/dL 8.6 9.1   ALBUMIN g/dL 2.6* 3.3*     Results from last 7 days   Lab Units 05/15/24  1026   LACTATE mmol/L 0.9     Hemoglobin A1C   Date/Time Value Ref Range Status   05/16/2024 0441 10.50 (H) 4.80 - 5.60 % Final     Glucose   Date/Time Value Ref Range Status   05/16/2024 1117 188 (H) 70 - 130 mg/dL Final   05/16/2024 0605 70 70 - 130 mg/dL Final   05/15/2024 2053 111 70 - 130 mg/dL Final   05/15/2024 1606 319 (H) 70 - 130 mg/dL Final       No radiology results for the last day    I have personally reviewed all medications:  Scheduled Medications  cefTRIAXone, 1,000 mg, Intravenous, Q24H  enoxaparin, 40 mg, Subcutaneous, Q24H  insulin glargine, 20 Units, Subcutaneous, Daily  insulin lispro, 2-9 Units, Subcutaneous, 4x Daily AC & at Bedtime  lactobacillus acidophilus, 1 capsule, Oral, Daily  levothyroxine, 50 mcg, Oral, Daily  pantoprazole, 40 mg, Oral, Daily  sodium chloride, 10 mL, Intravenous, Q12H  tamsulosin, 0.4 mg, Oral, Daily      Infusions  sodium chloride, 100 mL/hr, Last Rate: 100 mL/hr (05/15/24 1931)      Diet  Diet: Diabetic; Consistent Carbohydrate; Fluid Consistency: Thin (IDDSI 0)    I have personally reviewed:  [x]  Laboratory   [x]  Microbiology   []  Radiology   []  EKG/Telemetry  []  Cardiology/Vascular   []  Pathology    []  Records       Assessment/Plan     Active Hospital Problems    Diagnosis  POA    **Acute UTI [N39.0]  Yes    Bladder cancer [C67.9]  Yes    Gastroesophageal reflux disease without esophagitis [K21.9]  Yes    Acquired hypothyroidism [E03.9]  Yes    Type 2 diabetes mellitus with hyperglycemia, with long-term current use of insulin [E11.65, Z79.4]  Not Applicable      Resolved Hospital Problems   No resolved problems to display.       83 y.o. male admitted with Acute UTI.    Acute Cystitis: Continue rocephin.  UCx was mixed. BCx pending. Has developed pain. Will check CT.  Bladder Cancer with Obstructive Symptom Hx: Stage 2 per outpatient provider. CT as above.  MENDOZA: Continue IVF and holding nephrotoxics. CT as above  Alk Phos: Improving to near normal. GGT was not elevated. Would defer additional cancer staging to his home oncologist unless other acute issues arise. Will follow up CT results as above.  DM2: A1c 10.3. Dropped to 60s with half his home long acting insulin. Decrease long acting further to 10 units. SSI. Monitor requirements.  Hypothyroidism: Synthroid  PPX: Lovenox  Disposition: Home/few days    Expected discharge date/ time has not been documented.     Francisco Acosta MD  [facility]  05/16/24  11:55 EDT    Dictated portions of note using Dragon dictation software.  Copied text in this note has been reviewed by me and remains accurate as of 05/16/24

## 2024-05-16 NOTE — PLAN OF CARE
Goal Outcome Evaluation:   VSS  C/o of lower abdominal pain/pain when urinating today  Pain somewhat controlled  IV Rocephin started  WBC trending down  Strict NPO tonight for Stent procedure in the AM  A&Ox4

## 2024-05-16 NOTE — CONSULTS
"       [facility] CONSULT      Patient Identification:  NAME:  Bennie Greene  Age:  83 y.o.   Sex:  male   :  1940   MRN:  [id number]     Chief complaint: AMS and fatigue    History of present illness:      Pt is a 83 y.o. male with a history of prostate cancer s/p radiation therapy, muscle invasive bladder cancer, urinary retention and ureteral obstruction with chronic right ureteral stent that presented to the ED on 5/15/24 with complaints of fatigue and confusion reported by patient's family over the last several days. Pt was diagnosed and admitted with acute UTI and encephalopathy. Urology was consulted for evaluation and treatment of a malpositioned ureteral stent. Nurse taking care of patient reports suprapubic and right lower quadrant abdominal pain that started today. Patient reports that pain has now improved with PO pain medication. Patient states that he began to feel \"off\" x 5 days ago with complaints of fatigue and chills. Denies fever, flank pain, nausea or vomiting. Patient currently sees Dr. Pope with [facility]. Recently had TURBT on 24 for bladder mass. A nephrostomy tube was placed during this admission for right hydronephrosis and then converted to an antegrade stent on 24.     In hospital:  -AVSS, unable to assess UOP  -WBC - 11.82 (14)  -Creat - 1.65 (1.77)  -UA - Large LE, negative nitrites, TNTC WBC  -UCx - 25,000 CFU/mL Mixed Shelia Isolated   -Blood culture - No growth    -CT - Independently reviewed- Proximal aspect of right ureteral stent extending through the renal parenchyma into perirenal fat, moderate right  hydronephrosis, right perinephric and periureteral stranding    Past medical history:  Past Medical History:   Diagnosis Date    Bladder cancer     Diabetes mellitus     Dyspepsia     Erectile dysfunction     Gastritis 2014    Marked gastritis by EGD- Dr. BRAXTON Garcia; H. Pylori +    Gastroparesis     s/p gastrectomy    GERD (gastroesophageal reflux " disease)     Hypothyroidism     Other hyperlipidemia 03/02/2020    Personal history of malignant neoplasm of prostate     2006 s/p XRT    Pes cavus     Prostate cancer     and radiation    SCC (squamous cell carcinoma)     scalp/ neck    Type 2 diabetes mellitus 2015       Past surgical history:  Past Surgical History:   Procedure Laterality Date    CHOLECYSTECTOMY  05/2014    Dr. Ton Killian    COLONOSCOPY  10/2014    Dr. Killian    PARTIAL GASTRECTOMY  05/2014    Dr. Killian; for gastric outlet obstruction    UPPER GASTROINTESTINAL ENDOSCOPY  2015       Allergies:  Patient has no known allergies.    Home medications:  Medications Prior to Admission   Medication Sig Dispense Refill Last Dose    BD Pen Needle Nola 2nd Gen 32G X 4 MM misc USE WITH INSULIN EVERY  each 3     Blood Glucose Monitoring Suppl (Blood Glucose Monitor System) w/Device kit Use to test 2 (Two) Times a Day. 1 each 0     glucose blood test strip Use one test strip 2 (Two) Times a Day. Use as instructed 200 each 11     Lancets misc Use 1 Daily to check blood sugar 100 each 1     levothyroxine (SYNTHROID, LEVOTHROID) 50 MCG tablet TAKE 1 TABLET BY MOUTH EVERY DAY 90 tablet 0 5/14/2024    pantoprazole (PROTONIX) 40 MG EC tablet Take 1 tablet by mouth Daily. 90 tablet 3 5/14/2024    Probiotic Product ([facility]) capsule Take 1 tablet by mouth Daily. 30 capsule 5 5/14/2024    tamsulosin (Flomax) 0.4 MG capsule 24 hr capsule Take 1 capsule by mouth Daily. 30 capsule 11 5/14/2024    Tresiba FlexTouch 200 UNIT/ML solution pen-injector pen injection Inject 38 Units under the skin into the appropriate area as directed Daily. (Patient taking differently: Inject 40 Units under the skin into the appropriate area as directed Daily.) 18 mL 0 5/15/2024        Hospital medications:  cefTRIAXone, 1,000 mg, Intravenous, Q24H  enoxaparin, 40 mg, Subcutaneous, Q24H  [START ON 5/17/2024] insulin glargine, 10 Units, Subcutaneous, Daily  insulin  lispro, 2-9 Units, Subcutaneous, 4x Daily AC & at Bedtime  lactobacillus acidophilus, 1 capsule, Oral, Daily  levothyroxine, 50 mcg, Oral, Daily  pantoprazole, 40 mg, Oral, Daily  sodium chloride, 10 mL, Intravenous, Q12H  tamsulosin, 0.4 mg, Oral, Daily      sodium chloride, 100 mL/hr, Last Rate: 100 mL/hr (05/15/24 1931)        acetaminophen **OR** acetaminophen **OR** acetaminophen    senna-docusate sodium **AND** polyethylene glycol **AND** bisacodyl **AND** bisacodyl    dextrose    dextrose    glucagon (human recombinant)    HYDROcodone-acetaminophen    ondansetron ODT **OR** ondansetron    [COMPLETED] Insert Peripheral IV **AND** sodium chloride    sodium chloride    sodium chloride    Family history:  Family History   Problem Relation Age of Onset    Heart attack Mother     Diabetes type II Brother     Diabetes Brother     Prostate cancer Brother     Diabetes type II Son     Breast cancer Sister        Social history:  Social History     Tobacco Use    Smoking status: Former     Current packs/day: 0.00     Average packs/day: 1 pack/day for 45.0 years (45.0 ttl pk-yrs)     Types: Cigarettes     Start date: 1959     Quit date:      Years since quittin.3    Smokeless tobacco: Never   Vaping Use    Vaping status: Never Used   Substance Use Topics    Alcohol use: No    Drug use: No       Review of systems:      12 point negative except as in HPI    Objective:  TMax 24 hours:   Temp (24hrs), Av.6 °F (37 °C), Min:97.9 °F (36.6 °C), Max:99.3 °F (37.4 °C)      Vitals Ranges:   Temp:  [97.9 °F (36.6 °C)-99.3 °F (37.4 °C)] 98.5 °F (36.9 °C)  Heart Rate:  [61-74] 71  Resp:  [18] 18  BP: (139-155)/(69-77) 140/77    Intake/Output Last 3 shifts:  I/O last 3 completed shifts:  In: 2170 [P.O.:570; I.V.:500; IV Piggyback:1100]  Out: 300 [Urine:300]     Physical Exam:    General Appearance:    Alert, cooperative, NAD, sitting at bedside   Back:     No CVA tenderness   Lungs:     Respirations unlabored, no  wheezing   Abdomen:    :      Soft, ND, mild suprapubic tenderness, no masses, no guarding    Bladder non-palpable   Neuro/Psych:   Orientation intact, mood/affect pleasant       Results review:   I reviewed the patient's new clinical results.    Data review:  Lab Results (last 24 hours)       Procedure Component Value Units Date/Time    Blood Culture - Blood, Arm, Left [[id number]]  (Normal) Collected: 05/15/24 1220    Specimen: Blood from Arm, Left Updated: 05/16/24 1230     Blood Culture No growth at 24 hours    Narrative:      Less than seven (7) mL's of blood was collected.  Insufficient quantity may yield false negative results.    Blood Culture - Blood, Arm, Right [[id number]]  (Normal) Collected: 05/15/24 1155    Specimen: Blood from Arm, Right Updated: 05/16/24 1216     Blood Culture No growth at 24 hours    POC Glucose Once [[id number]]  (Abnormal) Collected: 05/16/24 1117    Specimen: Blood Updated: 05/16/24 1120     Glucose 188 mg/dL     Urine Culture - Urine, Urine, Clean Catch [[id number]] Collected: 05/15/24 1018    Specimen: Urine, Clean Catch Updated: 05/16/24 0935     Urine Culture 25,000 CFU/mL Mixed Shelia Isolated    Narrative:      Specimen contains mixed organisms of questionable pathogenicity suggestive of contamination. If symptoms persist, suggest recollection.  Colonization of the urinary tract without infection is common. Treatment is discouraged unless the patient is symptomatic, pregnant, or undergoing an invasive urologic procedure.    POC Glucose Once [[id number]]  (Normal) Collected: 05/16/24 0605    Specimen: Blood Updated: 05/16/24 0607     Glucose 70 mg/dL     Hemoglobin A1c [[id number]]  (Abnormal) Collected: 05/16/24 0441    Specimen: Blood Updated: 05/16/24 0537     Hemoglobin A1C 10.50 %     Narrative:      Hemoglobin A1C Ranges:    Increased Risk for Diabetes  5.7% to 6.4%  Diabetes                     >= 6.5%  Diabetic Goal                < 7.0%    Comprehensive Metabolic Panel  [[id number]]  (Abnormal) Collected: 05/16/24 0441    Specimen: Blood Updated: 05/16/24 0529     Glucose 66 mg/dL      BUN 29 mg/dL      Creatinine 1.65 mg/dL      Sodium 141 mmol/L      Potassium 3.8 mmol/L      Chloride 111 mmol/L      CO2 21.2 mmol/L      Calcium 8.6 mg/dL      Total Protein 6.5 g/dL      Albumin 2.6 g/dL      ALT (SGPT) 21 U/L      AST (SGOT) 21 U/L      Alkaline Phosphatase 121 U/L      Total Bilirubin 0.3 mg/dL      Globulin 3.9 gm/dL      A/G Ratio 0.7 g/dL      BUN/Creatinine Ratio 17.6     Anion Gap 8.8 mmol/L      eGFR 41.2 mL/min/1.73     Narrative:      GFR Normal >60  Chronic Kidney Disease <60  Kidney Failure <15    The GFR formula is only valid for adults with stable renal function between ages 18 and 70.    Gamma GT [[id number]]  (Normal) Collected: 05/16/24 0441    Specimen: Blood Updated: 05/16/24 0529     GGT 26 U/L     CBC Auto Differential [[id number]]  (Abnormal) Collected: 05/16/24 0441    Specimen: Blood Updated: 05/16/24 0506     WBC 11.82 10*3/mm3      RBC 3.68 10*6/mm3      Hemoglobin 9.7 g/dL      Hematocrit 30.0 %      MCV 81.5 fL      MCH 26.4 pg      MCHC 32.3 g/dL      RDW 13.4 %      RDW-SD 39.0 fl      MPV 9.5 fL      Platelets 334 10*3/mm3      Neutrophil % 79.6 %      Lymphocyte % 9.7 %      Monocyte % 8.3 %      Eosinophil % 1.1 %      Basophil % 0.3 %      Immature Grans % 1.0 %      Neutrophils, Absolute 9.41 10*3/mm3      Lymphocytes, Absolute 1.15 10*3/mm3      Monocytes, Absolute 0.98 10*3/mm3      Eosinophils, Absolute 0.13 10*3/mm3      Basophils, Absolute 0.03 10*3/mm3      Immature Grans, Absolute 0.12 10*3/mm3      nRBC 0.0 /100 WBC     POC Glucose Once [[id number]]  (Normal) Collected: 05/15/24 2053    Specimen: Blood Updated: 05/15/24 2054     Glucose 111 mg/dL     POC Glucose Once [[id number]]  (Abnormal) Collected: 05/15/24 1606    Specimen: Blood Updated: 05/15/24 1607     Glucose 319 mg/dL              Imaging:  Imaging Results (Last 24 Hours)        Procedure Component Value Units Date/Time    CT Abdomen Pelvis Without Contrast [[id number]] Collected: 05/16/24 1255     Updated: 05/16/24 1305    Narrative:      CT ABDOMEN PELVIS WO CONTRAST-     HISTORY: 83 years of age, Male.  UTI, bladder cancer, lower abd/pelvic  pain; N39.0-Urinary tract infection, site not specified;  G93.40-Encephalopathy, unspecified; E11.69-Type 2 diabetes mellitus with  other specified complication     TECHNIQUE:  CT includes axial imaging from the lung bases to the  trochanters without intravenous contrast and without use of oral  contrast. Data reconstructed in coronal and sagittal planes. Radiation  dose reduction techniques were utilized, including automated exposure  control and exposure modulation based on body size.     COMPARISON: MRI abdomen 03/05/2023.     FINDINGS: There is dependent lower lobe atelectasis. Small pericardial  effusion is present.     Liver, spleen, adrenal glands, pancreas appear within normal limits.  There has been previous cholecystectomy and there are several surgical  clips in the right upper quadrant.     There is a right ureteral stent. The proximal aspect of the stent  extends through the renal parenchyma and posterior to the right upper  pole cortex into the right perirenal fat. There is moderate right  hydronephrosis and there is right perinephric and periureteral  stranding.     There is a right posterior and lateral bladder mass associated with  masslike thickening indistinguishable from the prostate gland.     Left kidney appears within normal limits for there is a left extrarenal  pelvis which is mildly distended. There is no bowel dilatation or  evidence of bowel obstruction. Small bowel anastomotic sutures are  present left upper quadrant and there is been gastrojejunostomy.  Scoliotic curvature is present of the lumbar spine with multilevel  degenerative disc disease.       Impression:      1. The proximal pigtail loop of the right ureteral  stent extends  posterior to the right renal upper pole cortex partially into the right  pararenal fat. There is moderate right hydronephrosis and right  perinephric and periureteral stranding.  2. Right lateral and posterior urinary bladder mass is indistinguishable  from the prostate gland on this noncontrasted study.  3. Small pericardial effusion. Bibasilar atelectasis.     Radiation dose reduction techniques were utilized, including automated  exposure control and exposure modulation based on body size.        This report was finalized on 5/16/2024 1:02 PM by Dr. Zachary Park M.D on Workstation: [id number]                  Assessment:     Right hydronephrosis s/t malpositioned right ureteral stent  Urinary tract infection  Bladder cancer s/p TURBT  Hx prostate cancer s/p IMRT    Plan:     - No acute urologic surgical intervention planned today. CT independently reviewed by Dr. Wright. No evidence of significant urine leakage or urinoma noted around right kidney. Will place NPO at midnight for intervention tomorrow.   - Consent  - Add on for cystoscopy, right ureteral stent repositioning with Dr. Wright  - Continue IV antibiotics  - Urology will follow    ALTAGRACIA Velez  05/16/24  16:03 EDT    Plan reviewed and discussed with Dr. Wright

## 2024-05-16 NOTE — PROGRESS NOTES
"Nutrition Services    Patient Name:  Bennie Greene  YOB: 1940  MRN: [id number]  Admit Date:  5/15/2024  Assessment Date:  05/16/24    Summary: Nutrition Consult per RN admission screen  This is a 84 yo male male with a history of HTN, DM2, Hypothyroid, bladder cancer who presents to [facility] complaining of dysuria and confusion this morning. Pt on a Diabetic Consistent Carb diet and was able to eat about 75% of breakfast this am. Labs: BUN 29, cr 1.65, A1C 10.5.  Pt reports a 10lb wt loss x 3mo. BMI 23.12.    Plan/Recommendation  Good po intake encouraged with all meals  Will offer a Boost Glucose supplement daily  Will monitor labs/wt/po      CLINICAL NUTRITION ASSESSMENT      Reason for Assessment MST score 2+, Nurse Admission Screen     Diagnosis/Problem   UTI, hyperglycemia, metabolic encephalopathy   Medical/Surgical History Past Medical History:   Diagnosis Date    Bladder cancer     Diabetes mellitus     Dyspepsia     Erectile dysfunction     Gastritis 04/09/2014    Marked gastritis by EGD- Dr. BRAXTON Garcia; H. Pylori +    Gastroparesis     s/p gastrectomy    GERD (gastroesophageal reflux disease)     Hypothyroidism     Other hyperlipidemia 03/02/2020    Personal history of malignant neoplasm of prostate     2006 s/p XRT    Pes cavus     Prostate cancer     and radiation    SCC (squamous cell carcinoma)     scalp/ neck    Type 2 diabetes mellitus 2015       Past Surgical History:   Procedure Laterality Date    CHOLECYSTECTOMY  05/2014    Dr. Ton Killian    COLONOSCOPY  10/2014    Dr. Killian    PARTIAL GASTRECTOMY  05/2014    Dr. Killian; for gastric outlet obstruction    UPPER GASTROINTESTINAL ENDOSCOPY  2015        Anthropometrics        Current Height  Current Weight  BMI kg/m2 Height: 177.8 cm (70\")  Weight: 73.1 kg (161 lb 1.6 oz) (05/15/24 1425)  Body mass index is 23.12 kg/m².   Adjusted BMI (if applicable)    BMI Category Normal/Healthy (18.4 - 24.9)   Ideal Body " Weight (IBW) 160lb   Usual Body Weight (UBW) 170's   Weight Trend Loss   Weight History Wt Readings from Last 30 Encounters:   05/15/24 1425 73.1 kg (161 lb 1.6 oz)   03/14/24 0826 77 kg (169 lb 12.8 oz)   02/05/24 1047 78 kg (172 lb)   11/14/23 0824 79 kg (174 lb 3.2 oz)   05/12/23 1244 78.4 kg (172 lb 12.8 oz)   05/03/23 0749 78 kg (172 lb)   03/30/23 1138 80.3 kg (177 lb)   01/30/23 1331 79 kg (174 lb 3.2 oz)   12/02/22 0903 79.7 kg (175 lb 12.8 oz)   10/27/22 0825 79.6 kg (175 lb 6.4 oz)   09/28/22 1250 78.3 kg (172 lb 9.6 oz)   08/30/22 1010 78.5 kg (173 lb)   07/20/22 0815 78.9 kg (174 lb)   04/12/22 0926 80.4 kg (177 lb 3.2 oz)   04/07/22 1002 81.6 kg (180 lb)   01/11/22 0850 82.3 kg (181 lb 6.4 oz)   12/14/21 0823 78 kg (171 lb 15.3 oz)   11/01/21 1301 78 kg (171 lb 15.3 oz)   11/01/21 0503 78.4 kg (172 lb 12.8 oz)   10/31/21 0821 78.7 kg (173 lb 8 oz)   10/31/21 0955 78.5 kg (173 lb)   10/29/21 0828 79.7 kg (175 lb 9.6 oz)   08/30/21 1155 76.4 kg (168 lb 6.4 oz)   04/16/21 0812 79.2 kg (174 lb 9.6 oz)   10/01/20 0937 78.5 kg (173 lb)   03/02/20 0750 81.3 kg (179 lb 3.2 oz)   10/01/19 0652 81.5 kg (179 lb 9.6 oz)   05/04/19 1600 74.8 kg (165 lb)   03/06/19 1433 83.7 kg (184 lb 9.6 oz)   02/15/19 0805 83 kg (182 lb 14.4 oz)   11/28/18 0841 82.3 kg (181 lb 8 oz)   08/08/18 0801 80.3 kg (177 lb)      --  Labs       Pertinent Labs    Results from last 7 days   Lab Units 05/16/24  0441 05/15/24  1026   SODIUM mmol/L 141 139   POTASSIUM mmol/L 3.8 4.5   CHLORIDE mmol/L 111* 104   CO2 mmol/L 21.2* 24.0   BUN mg/dL 29* 36*   CREATININE mg/dL 1.65* 1.77*   CALCIUM mg/dL 8.6 9.1   BILIRUBIN mg/dL 0.3 0.3   ALK PHOS U/L 121* 150*   ALT (SGPT) U/L 21 20   AST (SGOT) U/L 21 13   GLUCOSE mg/dL 66 326*     Results from last 7 days   Lab Units 05/16/24  0441   HEMOGLOBIN g/dL 9.7*   HEMATOCRIT % 30.0*   WBC 10*3/mm3 11.82*   ALBUMIN g/dL 2.6*     Results from last 7 days   Lab Units 05/16/24  0441 05/15/24  1026   PLATELETS  10*3/mm3 334 336     COVID19   Date Value Ref Range Status   10/31/2021 Not Detected Not Detected - Ref. Range Final     Lab Results   Component Value Date    HGBA1C 10.50 (H) 05/16/2024          Medications           Scheduled Medications cefTRIAXone, 1,000 mg, Intravenous, Q24H  enoxaparin, 40 mg, Subcutaneous, Q24H  insulin glargine, 20 Units, Subcutaneous, Daily  insulin lispro, 2-9 Units, Subcutaneous, 4x Daily AC & at Bedtime  lactobacillus acidophilus, 1 capsule, Oral, Daily  levothyroxine, 50 mcg, Oral, Daily  pantoprazole, 40 mg, Oral, Daily  sodium chloride, 10 mL, Intravenous, Q12H  tamsulosin, 0.4 mg, Oral, Daily       Infusions sodium chloride, 100 mL/hr, Last Rate: 100 mL/hr (05/15/24 1931)       PRN Medications   acetaminophen **OR** acetaminophen **OR** acetaminophen    senna-docusate sodium **AND** polyethylene glycol **AND** bisacodyl **AND** bisacodyl    dextrose    dextrose    glucagon (human recombinant)    ondansetron ODT **OR** ondansetron    [COMPLETED] Insert Peripheral IV **AND** sodium chloride    sodium chloride    sodium chloride     Physical Findings          General Findings oriented, room air   Oral/Mouth Cavity WDL   Edema  no edema   Gastrointestinal last bowel movement: 5/15   Skin  skin intact   Tubes/Drains/Lines none   NFPE No clinical signs of muscle wasting or fat loss   --  Current Nutrition Orders & Evaluation of Intake       Oral Nutrition     Food Allergies NKFA   Current PO Diet Diet: Diabetic; Consistent Carbohydrate; Fluid Consistency: Thin (IDDSI 0)   Supplement n/a   PO Evaluation     % PO Intake 75% of breakfast this am    Factors Affecting Intake: altered mental status   --  PES STATEMENT / NUTRITION DIAGNOSIS      Nutrition Dx Problem  Problem: Unintentional Weight Loss  Etiology: Medical Diagnosis - UTI, confusion    Signs/Symptoms: Report/Observation     NUTRITION INTERVENTION / PLAN OF CARE      Intervention Goal(s) Maintain nutrition status, Reduce/improve  symptoms, Maintain intake, Maintain weight, and PO intake goal %: 75+         RD Intervention/Action Interview for preferences, Encourage intake, Continue to monitor, Care plan reviewed, and Recommend/order: supplement   --      Prescription/Orders:       PO Diet       Supplements Boost Glucose daily      Enteral Nutrition       Parenteral Nutrition    New Prescription Ordered? Yes   --      Monitor/Evaluation Per protocol, PO intake, Supplement intake, Pertinent labs, Weight   Discharge Plan/Needs Pending clinical course   --    RD to follow per protocol.      Electronically signed by:  Marcela Canas RD  05/16/24 09:32 EDT

## 2024-05-16 NOTE — SIGNIFICANT NOTE
05/16/24 1213   OTHER   Discipline physical therapist   Rehab Time/Intention   Session Not Performed patient unavailable for evaluation  (off the floor to CT. Will follow up as time allows.)     FOllowed up at 1616- pt napping, but awoke easily. Asked PT to follow up tomorrow.

## 2024-05-17 ENCOUNTER — APPOINTMENT (OUTPATIENT)
Dept: GENERAL RADIOLOGY | Facility: HOSPITAL | Age: 84
End: 2024-05-17
Payer: MEDICARE

## 2024-05-17 ENCOUNTER — ANESTHESIA EVENT (OUTPATIENT)
Dept: PERIOP | Facility: HOSPITAL | Age: 84
End: 2024-05-17
Payer: MEDICARE

## 2024-05-17 ENCOUNTER — ANESTHESIA (OUTPATIENT)
Dept: PERIOP | Facility: HOSPITAL | Age: 84
End: 2024-05-17
Payer: MEDICARE

## 2024-05-17 LAB
ALBUMIN SERPL-MCNC: 2.5 G/DL (ref 3.5–5.2)
ALBUMIN/GLOB SERPL: 0.7 G/DL
ALP SERPL-CCNC: 109 U/L (ref 39–117)
ALT SERPL W P-5'-P-CCNC: 23 U/L (ref 1–41)
ANION GAP SERPL CALCULATED.3IONS-SCNC: 12 MMOL/L (ref 5–15)
AST SERPL-CCNC: 19 U/L (ref 1–40)
BILIRUB SERPL-MCNC: 0.2 MG/DL (ref 0–1.2)
BUN SERPL-MCNC: 32 MG/DL (ref 8–23)
BUN/CREAT SERPL: 16.6 (ref 7–25)
CALCIUM SPEC-SCNC: 8.2 MG/DL (ref 8.6–10.5)
CHLORIDE SERPL-SCNC: 110 MMOL/L (ref 98–107)
CO2 SERPL-SCNC: 19 MMOL/L (ref 22–29)
CREAT SERPL-MCNC: 1.93 MG/DL (ref 0.76–1.27)
DEPRECATED RDW RBC AUTO: 42.2 FL (ref 37–54)
EGFRCR SERPLBLD CKD-EPI 2021: 34.1 ML/MIN/1.73
ERYTHROCYTE [DISTWIDTH] IN BLOOD BY AUTOMATED COUNT: 13.4 % (ref 12.3–15.4)
GLOBULIN UR ELPH-MCNC: 3.8 GM/DL
GLUCOSE BLDC GLUCOMTR-MCNC: 100 MG/DL (ref 70–130)
GLUCOSE BLDC GLUCOMTR-MCNC: 160 MG/DL (ref 70–130)
GLUCOSE BLDC GLUCOMTR-MCNC: 61 MG/DL (ref 70–130)
GLUCOSE BLDC GLUCOMTR-MCNC: 68 MG/DL (ref 70–130)
GLUCOSE BLDC GLUCOMTR-MCNC: 71 MG/DL (ref 70–130)
GLUCOSE BLDC GLUCOMTR-MCNC: 99 MG/DL (ref 70–130)
GLUCOSE SERPL-MCNC: 60 MG/DL (ref 65–99)
HCT VFR BLD AUTO: 30.3 % (ref 37.5–51)
HGB BLD-MCNC: 9.5 G/DL (ref 13–17.7)
MAGNESIUM SERPL-MCNC: 2 MG/DL (ref 1.6–2.4)
MCH RBC QN AUTO: 26.8 PG (ref 26.6–33)
MCHC RBC AUTO-ENTMCNC: 31.4 G/DL (ref 31.5–35.7)
MCV RBC AUTO: 85.4 FL (ref 79–97)
PHOSPHATE SERPL-MCNC: 3.8 MG/DL (ref 2.5–4.5)
PLATELET # BLD AUTO: 312 10*3/MM3 (ref 140–450)
PMV BLD AUTO: 9.6 FL (ref 6–12)
POTASSIUM SERPL-SCNC: 4 MMOL/L (ref 3.5–5.2)
PROT SERPL-MCNC: 6.3 G/DL (ref 6–8.5)
RBC # BLD AUTO: 3.55 10*6/MM3 (ref 4.14–5.8)
SODIUM SERPL-SCNC: 141 MMOL/L (ref 136–145)
WBC NRBC COR # BLD AUTO: 9.11 10*3/MM3 (ref 3.4–10.8)

## 2024-05-17 PROCEDURE — 82948 REAGENT STRIP/BLOOD GLUCOSE: CPT

## 2024-05-17 PROCEDURE — 25010000002 HYDROMORPHONE 1 MG/ML SOLUTION: Performed by: UROLOGY

## 2024-05-17 PROCEDURE — 25010000002 ONDANSETRON PER 1 MG: Performed by: ANESTHESIOLOGY

## 2024-05-17 PROCEDURE — 63710000001 INSULIN GLARGINE PER 5 UNITS: Performed by: INTERNAL MEDICINE

## 2024-05-17 PROCEDURE — 74420 UROGRAPHY RTRGR +-KUB: CPT

## 2024-05-17 PROCEDURE — 0TP98DZ REMOVAL OF INTRALUMINAL DEVICE FROM URETER, VIA NATURAL OR ARTIFICIAL OPENING ENDOSCOPIC: ICD-10-PCS | Performed by: HOSPITALIST

## 2024-05-17 PROCEDURE — 84100 ASSAY OF PHOSPHORUS: CPT | Performed by: INTERNAL MEDICINE

## 2024-05-17 PROCEDURE — 25010000002 MEPERIDINE PER 100 MG: Performed by: ANESTHESIOLOGY

## 2024-05-17 PROCEDURE — 0 IOTHALAMATE 60 % SOLUTION: Performed by: UROLOGY

## 2024-05-17 PROCEDURE — BT1D1ZZ FLUOROSCOPY OF RIGHT KIDNEY, URETER AND BLADDER USING LOW OSMOLAR CONTRAST: ICD-10-PCS | Performed by: HOSPITALIST

## 2024-05-17 PROCEDURE — 25010000002 HYDRALAZINE PER 20 MG: Performed by: ANESTHESIOLOGY

## 2024-05-17 PROCEDURE — 85027 COMPLETE CBC AUTOMATED: CPT | Performed by: INTERNAL MEDICINE

## 2024-05-17 PROCEDURE — C1769 GUIDE WIRE: HCPCS | Performed by: UROLOGY

## 2024-05-17 PROCEDURE — 80053 COMPREHEN METABOLIC PANEL: CPT | Performed by: INTERNAL MEDICINE

## 2024-05-17 PROCEDURE — 83735 ASSAY OF MAGNESIUM: CPT | Performed by: INTERNAL MEDICINE

## 2024-05-17 PROCEDURE — 25010000002 LABETALOL 5 MG/ML SOLUTION: Performed by: ANESTHESIOLOGY

## 2024-05-17 PROCEDURE — C1758 CATHETER, URETERAL: HCPCS | Performed by: UROLOGY

## 2024-05-17 PROCEDURE — 63710000001 INSULIN LISPRO (HUMAN) PER 5 UNITS: Performed by: INTERNAL MEDICINE

## 2024-05-17 PROCEDURE — C2617 STENT, NON-COR, TEM W/O DEL: HCPCS | Performed by: UROLOGY

## 2024-05-17 PROCEDURE — 0T768DZ DILATION OF RIGHT URETER WITH INTRALUMINAL DEVICE, VIA NATURAL OR ARTIFICIAL OPENING ENDOSCOPIC: ICD-10-PCS | Performed by: HOSPITALIST

## 2024-05-17 PROCEDURE — 25010000002 CEFTRIAXONE PER 250 MG: Performed by: INTERNAL MEDICINE

## 2024-05-17 PROCEDURE — 25010000002 FENTANYL CITRATE (PF) 50 MCG/ML SOLUTION: Performed by: ANESTHESIOLOGY

## 2024-05-17 DEVICE — URETERAL STENT
Type: IMPLANTABLE DEVICE | Site: URETER | Status: FUNCTIONAL
Brand: CONTOUR™

## 2024-05-17 RX ORDER — HYDROCODONE BITARTRATE AND ACETAMINOPHEN 5; 325 MG/1; MG/1
1 TABLET ORAL ONCE AS NEEDED
Status: DISCONTINUED | OUTPATIENT
Start: 2024-05-17 | End: 2024-05-17 | Stop reason: HOSPADM

## 2024-05-17 RX ORDER — DIPHENHYDRAMINE HYDROCHLORIDE 50 MG/ML
12.5 INJECTION INTRAMUSCULAR; INTRAVENOUS
Status: DISCONTINUED | OUTPATIENT
Start: 2024-05-17 | End: 2024-05-17 | Stop reason: HOSPADM

## 2024-05-17 RX ORDER — SODIUM CHLORIDE 0.9 % (FLUSH) 0.9 %
3 SYRINGE (ML) INJECTION EVERY 12 HOURS SCHEDULED
Status: DISCONTINUED | OUTPATIENT
Start: 2024-05-17 | End: 2024-05-17 | Stop reason: HOSPADM

## 2024-05-17 RX ORDER — NALOXONE HCL 0.4 MG/ML
0.2 VIAL (ML) INJECTION AS NEEDED
Status: DISCONTINUED | OUTPATIENT
Start: 2024-05-17 | End: 2024-05-17 | Stop reason: HOSPADM

## 2024-05-17 RX ORDER — HYDROMORPHONE HYDROCHLORIDE 1 MG/ML
0.25 INJECTION, SOLUTION INTRAMUSCULAR; INTRAVENOUS; SUBCUTANEOUS
Status: DISCONTINUED | OUTPATIENT
Start: 2024-05-17 | End: 2024-05-17 | Stop reason: HOSPADM

## 2024-05-17 RX ORDER — MEPERIDINE HYDROCHLORIDE 25 MG/ML
12.5 INJECTION INTRAMUSCULAR; INTRAVENOUS; SUBCUTANEOUS
Status: DISCONTINUED | OUTPATIENT
Start: 2024-05-17 | End: 2024-05-17 | Stop reason: HOSPADM

## 2024-05-17 RX ORDER — LIDOCAINE HYDROCHLORIDE 10 MG/ML
0.5 INJECTION, SOLUTION INFILTRATION; PERINEURAL ONCE AS NEEDED
Status: DISCONTINUED | OUTPATIENT
Start: 2024-05-17 | End: 2024-05-17 | Stop reason: HOSPADM

## 2024-05-17 RX ORDER — FENTANYL CITRATE 50 UG/ML
25 INJECTION, SOLUTION INTRAMUSCULAR; INTRAVENOUS
Status: DISCONTINUED | OUTPATIENT
Start: 2024-05-17 | End: 2024-05-17 | Stop reason: HOSPADM

## 2024-05-17 RX ORDER — ONDANSETRON 2 MG/ML
4 INJECTION INTRAMUSCULAR; INTRAVENOUS ONCE AS NEEDED
Status: DISCONTINUED | OUTPATIENT
Start: 2024-05-17 | End: 2024-05-17 | Stop reason: HOSPADM

## 2024-05-17 RX ORDER — HYDRALAZINE HYDROCHLORIDE 20 MG/ML
5 INJECTION INTRAMUSCULAR; INTRAVENOUS
Status: DISCONTINUED | OUTPATIENT
Start: 2024-05-17 | End: 2024-05-17 | Stop reason: HOSPADM

## 2024-05-17 RX ORDER — PROMETHAZINE HYDROCHLORIDE 25 MG/1
25 TABLET ORAL ONCE AS NEEDED
Status: DISCONTINUED | OUTPATIENT
Start: 2024-05-17 | End: 2024-05-17 | Stop reason: HOSPADM

## 2024-05-17 RX ORDER — SODIUM CHLORIDE, SODIUM LACTATE, POTASSIUM CHLORIDE, CALCIUM CHLORIDE 600; 310; 30; 20 MG/100ML; MG/100ML; MG/100ML; MG/100ML
9 INJECTION, SOLUTION INTRAVENOUS CONTINUOUS
Status: DISCONTINUED | OUTPATIENT
Start: 2024-05-17 | End: 2024-05-17

## 2024-05-17 RX ORDER — ONDANSETRON 2 MG/ML
INJECTION INTRAMUSCULAR; INTRAVENOUS AS NEEDED
Status: DISCONTINUED | OUTPATIENT
Start: 2024-05-17 | End: 2024-05-17 | Stop reason: SURG

## 2024-05-17 RX ORDER — EPHEDRINE SULFATE 50 MG/ML
5 INJECTION, SOLUTION INTRAVENOUS ONCE AS NEEDED
Status: DISCONTINUED | OUTPATIENT
Start: 2024-05-17 | End: 2024-05-17 | Stop reason: HOSPADM

## 2024-05-17 RX ORDER — DROPERIDOL 2.5 MG/ML
0.62 INJECTION, SOLUTION INTRAMUSCULAR; INTRAVENOUS
Status: DISCONTINUED | OUTPATIENT
Start: 2024-05-17 | End: 2024-05-17 | Stop reason: HOSPADM

## 2024-05-17 RX ORDER — LIDOCAINE HYDROCHLORIDE 20 MG/ML
INJECTION, SOLUTION INFILTRATION; PERINEURAL AS NEEDED
Status: DISCONTINUED | OUTPATIENT
Start: 2024-05-17 | End: 2024-05-17 | Stop reason: SURG

## 2024-05-17 RX ORDER — HYDROCODONE BITARTRATE AND ACETAMINOPHEN 7.5; 325 MG/1; MG/1
1 TABLET ORAL EVERY 4 HOURS PRN
Status: DISCONTINUED | OUTPATIENT
Start: 2024-05-17 | End: 2024-05-17 | Stop reason: HOSPADM

## 2024-05-17 RX ORDER — FLUMAZENIL 0.1 MG/ML
0.2 INJECTION INTRAVENOUS AS NEEDED
Status: DISCONTINUED | OUTPATIENT
Start: 2024-05-17 | End: 2024-05-17 | Stop reason: HOSPADM

## 2024-05-17 RX ORDER — IPRATROPIUM BROMIDE AND ALBUTEROL SULFATE 2.5; .5 MG/3ML; MG/3ML
3 SOLUTION RESPIRATORY (INHALATION) ONCE AS NEEDED
Status: DISCONTINUED | OUTPATIENT
Start: 2024-05-17 | End: 2024-05-17 | Stop reason: HOSPADM

## 2024-05-17 RX ORDER — PROMETHAZINE HYDROCHLORIDE 25 MG/1
25 SUPPOSITORY RECTAL ONCE AS NEEDED
Status: DISCONTINUED | OUTPATIENT
Start: 2024-05-17 | End: 2024-05-17 | Stop reason: HOSPADM

## 2024-05-17 RX ORDER — PHENYLEPHRINE HCL IN 0.9% NACL 1 MG/10 ML
SYRINGE (ML) INTRAVENOUS AS NEEDED
Status: DISCONTINUED | OUTPATIENT
Start: 2024-05-17 | End: 2024-05-17 | Stop reason: SURG

## 2024-05-17 RX ORDER — LABETALOL HYDROCHLORIDE 5 MG/ML
5 INJECTION, SOLUTION INTRAVENOUS
Status: DISCONTINUED | OUTPATIENT
Start: 2024-05-17 | End: 2024-05-17 | Stop reason: HOSPADM

## 2024-05-17 RX ORDER — INSULIN LISPRO 100 [IU]/ML
2-7 INJECTION, SOLUTION INTRAVENOUS; SUBCUTANEOUS
Status: DISCONTINUED | OUTPATIENT
Start: 2024-05-17 | End: 2024-05-20 | Stop reason: HOSPADM

## 2024-05-17 RX ORDER — SODIUM CHLORIDE 0.9 % (FLUSH) 0.9 %
3-10 SYRINGE (ML) INJECTION AS NEEDED
Status: DISCONTINUED | OUTPATIENT
Start: 2024-05-17 | End: 2024-05-17 | Stop reason: HOSPADM

## 2024-05-17 RX ADMIN — HYDROMORPHONE HYDROCHLORIDE 1 MG: 1 INJECTION, SOLUTION INTRAMUSCULAR; INTRAVENOUS; SUBCUTANEOUS at 09:52

## 2024-05-17 RX ADMIN — DEXTROSE MONOHYDRATE 25 G: 25 INJECTION, SOLUTION INTRAVENOUS at 06:13

## 2024-05-17 RX ADMIN — PANTOPRAZOLE SODIUM 40 MG: 40 TABLET, DELAYED RELEASE ORAL at 08:10

## 2024-05-17 RX ADMIN — ACETAMINOPHEN 325MG 650 MG: 325 TABLET ORAL at 08:50

## 2024-05-17 RX ADMIN — FENTANYL CITRATE 25 MCG: 50 INJECTION, SOLUTION INTRAMUSCULAR; INTRAVENOUS at 19:21

## 2024-05-17 RX ADMIN — Medication 1 CAPSULE: at 08:10

## 2024-05-17 RX ADMIN — DEXTROSE MONOHYDRATE 25 G: 25 INJECTION, SOLUTION INTRAVENOUS at 16:35

## 2024-05-17 RX ADMIN — INSULIN LISPRO 2 UNITS: 100 INJECTION, SOLUTION INTRAVENOUS; SUBCUTANEOUS at 08:10

## 2024-05-17 RX ADMIN — INSULIN GLARGINE 10 UNITS: 100 INJECTION, SOLUTION SUBCUTANEOUS at 08:11

## 2024-05-17 RX ADMIN — HYDROCODONE BITARTRATE AND ACETAMINOPHEN 1 TABLET: 5; 325 TABLET ORAL at 06:44

## 2024-05-17 RX ADMIN — HYDROMORPHONE HYDROCHLORIDE 1 MG: 1 INJECTION, SOLUTION INTRAMUSCULAR; INTRAVENOUS; SUBCUTANEOUS at 12:41

## 2024-05-17 RX ADMIN — CEFTRIAXONE SODIUM 1000 MG: 1 INJECTION, POWDER, FOR SOLUTION INTRAMUSCULAR; INTRAVENOUS at 12:41

## 2024-05-17 RX ADMIN — TAMSULOSIN HYDROCHLORIDE 0.4 MG: 0.4 CAPSULE ORAL at 08:10

## 2024-05-17 RX ADMIN — HYDRALAZINE HYDROCHLORIDE 5 MG: 20 INJECTION INTRAMUSCULAR; INTRAVENOUS at 19:47

## 2024-05-17 RX ADMIN — Medication 10 ML: at 21:45

## 2024-05-17 RX ADMIN — LIDOCAINE HYDROCHLORIDE 100 MG: 20 INJECTION, SOLUTION INFILTRATION; PERINEURAL at 18:22

## 2024-05-17 RX ADMIN — LEVOTHYROXINE SODIUM 50 MCG: 50 TABLET ORAL at 08:10

## 2024-05-17 RX ADMIN — ONDANSETRON 4 MG: 2 INJECTION INTRAMUSCULAR; INTRAVENOUS at 18:49

## 2024-05-17 RX ADMIN — LABETALOL HYDROCHLORIDE 5 MG: 5 INJECTION, SOLUTION INTRAVENOUS at 19:47

## 2024-05-17 RX ADMIN — Medication 90 MCG: at 18:23

## 2024-05-17 RX ADMIN — MEPERIDINE HYDROCHLORIDE 12.5 MG: 25 INJECTION, SOLUTION INTRAMUSCULAR; INTRAVENOUS; SUBCUTANEOUS at 19:29

## 2024-05-17 NOTE — PROGRESS NOTES
Name: Bennie Greene ADMIT: 5/15/2024   : 1940  PCP: Travon Shah MD    MRN: [id number] LOS: 2 days   AGE/SEX: 83 y.o. male  ROOM: [facility]     Subjective   Subjective   Chief Complaint   Patient presents with    Hyperglycemia     No CP SOA NVD. R flank, RLQ, suprapubic pain present. Family reports not much urine output today.      Objective   Objective   Vital Signs  Temp:  [98.1 °F (36.7 °C)-98.4 °F (36.9 °C)] 98.4 °F (36.9 °C)  Heart Rate:  [57-71] 64  Resp:  [18-19] 19  BP: (124-164)/(75-92) 164/89  SpO2:  [99 %] 99 %  on   ;   Device (Oxygen Therapy): room air  Body mass index is 23.12 kg/m².    Physical Exam  Vitals and nursing note reviewed.   Constitutional:       Appearance: He is not diaphoretic.   HENT:      Head: Atraumatic.   Cardiovascular:      Rate and Rhythm: Normal rate and regular rhythm.      Pulses: Normal pulses.   Pulmonary:      Effort: Pulmonary effort is normal.      Breath sounds: No wheezing.   Abdominal:      General: There is no distension.      Palpations: Abdomen is soft.      Tenderness: There is abdominal tenderness. There is no guarding or rebound.   Musculoskeletal:         General: No tenderness.   Skin:     General: Skin is warm and dry.   Neurological:      Mental Status: He is alert. Mental status is at baseline.   Psychiatric:         Mood and Affect: Mood normal.         Behavior: Behavior normal.       Results Review  I reviewed the patient's new clinical results.    Results from last 7 days   Lab Units 24  0351 24  0441 05/15/24  1026   WBC 10*3/mm3 9.11 11.82* 14.00*   HEMOGLOBIN g/dL 9.5* 9.7* 11.1*   PLATELETS 10*3/mm3 312 334 336     Results from last 7 days   Lab Units 24  0351 24  0441 05/15/24  1026   SODIUM mmol/L 141 141 139   POTASSIUM mmol/L 4.0 3.8 4.5   CHLORIDE mmol/L 110* 111* 104   CO2 mmol/L 19.0* 21.2* 24.0   BUN mg/dL 32* 29* 36*   CREATININE mg/dL 1.93* 1.65* 1.77*   GLUCOSE mg/dL 60* 66 326*   EGFR mL/min/1.73  34.1* 41.2* 37.9*     Results from last 7 days   Lab Units 05/17/24  0351 05/16/24  0441 05/15/24  1026   ALBUMIN g/dL 2.5* 2.6* 3.3*   BILIRUBIN mg/dL 0.2 0.3 0.3   ALK PHOS U/L 109 121* 150*   AST (SGOT) U/L 19 21 13   ALT (SGPT) U/L 23 21 20     Results from last 7 days   Lab Units 05/17/24  0351 05/16/24  0441 05/15/24  1026   CALCIUM mg/dL 8.2* 8.6 9.1   ALBUMIN g/dL 2.5* 2.6* 3.3*   MAGNESIUM mg/dL 2.0  --   --    PHOSPHORUS mg/dL 3.8  --   --      Results from last 7 days   Lab Units 05/15/24  1026   LACTATE mmol/L 0.9     Hemoglobin A1C   Date/Time Value Ref Range Status   05/16/2024 0441 10.50 (H) 4.80 - 5.60 % Final     Glucose   Date/Time Value Ref Range Status   05/17/2024 1201 71 70 - 130 mg/dL Final   05/17/2024 0637 160 (H) 70 - 130 mg/dL Final   05/17/2024 0607 61 (L) 70 - 130 mg/dL Final   05/16/2024 2043 299 (H) 70 - 130 mg/dL Final   05/16/2024 1607 266 (H) 70 - 130 mg/dL Final   05/16/2024 1117 188 (H) 70 - 130 mg/dL Final   05/16/2024 0605 70 70 - 130 mg/dL Final       CT Abdomen Pelvis Without Contrast    Result Date: 5/16/2024  1. The proximal pigtail loop of the right ureteral stent extends posterior to the right renal upper pole cortex partially into the right pararenal fat. There is moderate right hydronephrosis and right perinephric and periureteral stranding. 2. Right lateral and posterior urinary bladder mass is indistinguishable from the prostate gland on this noncontrasted study. 3. Small pericardial effusion. Bibasilar atelectasis.  Radiation dose reduction techniques were utilized, including automated exposure control and exposure modulation based on body size.   This report was finalized on 5/16/2024 1:02 PM by Dr. Zachary Park M.D on Workstation: [id number]       I have personally reviewed all medications:  Scheduled Medications  cefTRIAXone, 1,000 mg, Intravenous, Q24H  enoxaparin, 40 mg, Subcutaneous, Q24H  insulin glargine, 10 Units, Subcutaneous, Daily  insulin lispro, 2-9  Units, Subcutaneous, 4x Daily AC & at Bedtime  lactobacillus acidophilus, 1 capsule, Oral, Daily  levothyroxine, 50 mcg, Oral, Daily  pantoprazole, 40 mg, Oral, Daily  sodium chloride, 10 mL, Intravenous, Q12H  tamsulosin, 0.4 mg, Oral, Daily      Infusions  sodium chloride, 100 mL/hr, Last Rate: 100 mL/hr (05/15/24 1931)      Diet  NPO Diet NPO Type: Sips with Meds    I have personally reviewed:  [x]  Laboratory   [x]  Microbiology   [x]  Radiology   [x]  EKG/Telemetry  []  Cardiology/Vascular   []  Pathology    []  Records       Assessment/Plan     Active Hospital Problems    Diagnosis  POA    **Acute UTI [N39.0]  Yes    Bladder cancer [C67.9]  Yes    Gastroesophageal reflux disease without esophagitis [K21.9]  Yes    Acquired hypothyroidism [E03.9]  Yes    Type 2 diabetes mellitus with hyperglycemia, with long-term current use of insulin [E11.65, Z79.4]  Not Applicable      Resolved Hospital Problems   No resolved problems to display.       83 y.o. male admitted with Acute UTI.    Pyelonephritis: Continue rocephin. UCx was mixed. BCx ngtd.  Bladder Cancer with Obstructive Symptom Hx: Stage 2 per outpatient provider. CT showed stent with perinephric stranding and need for repositioning. Intervention planned today. Monitor PVR. Urology following.  MENDOZA: Cr worse. Monitor after stent revision  Alk Phos: Elevation resolved. GGT was not elevated. Would defer additional cancer staging to his home oncologist unless other acute issues arise.  DM2: A1c 10.3. Low issues in AM again. Stop long acting. Change to low dose SSI. Monitoring requirements.  Hypothyroidism: Synthroid  PPX: Lovenox  Disposition: Home/few days    Expected Discharge Date: 5/19/2024; Expected Discharge Time:      Francisco Acosta MD  [facility]  05/17/24  14:21 EDT    Dictated portions of note using Dragon dictation software.  Copied text in this note has been reviewed by me and remains accurate as of 05/17/24

## 2024-05-17 NOTE — ANESTHESIA PREPROCEDURE EVALUATION
Anesthesia Evaluation     Patient summary reviewed and Nursing notes reviewed   NPO Solid Status: > 6 hours  NPO Liquid Status: > 2 hours           Airway   Mallampati: III  TM distance: >3 FB  Neck ROM: full  Dental - normal exam     Pulmonary    (-) COPD, asthma, not a smoker  Cardiovascular   Exercise tolerance: good (4-7 METS)    ECG reviewed    (+) hyperlipidemia  (-) past MI, dysrhythmias, angina    ROS comment: Echo 2021: nl LV/RV fxn, no major valve issues     RBBB    Denies CP, [place]     Neuro/Psych  (-) seizures, CVA  GI/Hepatic/Renal/Endo    (+) GERD well controlled, renal disease (MENDOZA)-, diabetes mellitus type 2 using insulin, thyroid problem hypothyroidism  (-) liver disease    Musculoskeletal     Abdominal    Substance History      OB/GYN          Other      history of cancer (hx prostate and bladder ca)                      Anesthesia Plan    ASA 3     general       Anesthetic plan, risks, benefits, and alternatives have been provided, discussed and informed consent has been obtained with: patient.        CODE STATUS:    Code Status (Patient has no pulse and is not breathing): CPR (Attempt to Resuscitate)  Medical Interventions (Patient has pulse or is breathing): Full Support

## 2024-05-17 NOTE — PROGRESS NOTES
[facility] Progress Note    05/17/24 18:12 EDT        Persistent pain in the right flank and right lower quadrant.  Plan for cystoscopy stent manipulation possible stent replacement this evening.

## 2024-05-17 NOTE — OP NOTE
CYSTOSCOPY URETERAL CATHETER/STENT INSERTION  Procedure Note    Bennie LOUIS Stone  5/17/2024    Pre-op Diagnosis:   Right Hydronephrosis secondary to Bladder Cancer    Post-op Diagnosis:     Post-Op Diagnosis Codes:     * Hydronephrosis due to obstruction of ureter [N13.1]    Procedure(s):  CYSTOSCOPY, RIGHT URETERAL STENT EXCHANGE    Surgeon(s):  Home Wright MD    Anesthesia: General    Staff:   Circulator: Luisa Cee RN  Scrub Person: Abdiaziz Teague    Estimated Blood Loss: none    Specimens:                * No orders in the log *      Drains: * No LDAs found *    Findings: Malpositioned right ureteral stent changed out.    Complications: None apparent    Indications: 83-year-old gentleman with a history of invasive bladder cancer has an indwelling stent recently had a percutaneous nephrostomy tube placed in an antegrade stent his stent is actually poking through the kidney outside of the parenchyma has been causing quite a bit of pain.  Now presents for stent manipulation possible stent change.    Procedure: Patient was taken the operative suite given general anesthesia after informed sent was obtained.  Placed in comfortable supine then lithotomy position.  Prepped and draped in usual fashion.  Surgical timeout was performed.  Cystoscope inserted.  His urethra was a little rough it was fairly fixed to prostatic and bladder neck.  Diffuse changes of his bladder consistent with carcinoma was seen.  A lot of debris and necrotic tissue was in his bladder which I irrigated out.  I was able to finally found the stent it was little difficult because bladder neck was so fixed and I can see the opening of the UVJ so I passed up a Pollick catheter adjacent to it and a retrograde pyelogram confirmed I was in the opening of the ureter and I was able to pass this up to the pelvis which was dilated.  A guidewire was passed up at this point and I anchored this outside the cystoscope.  I went back and took his  old stent and pulled it out.  The wire was then pushed back through the cystoscope over the Pollick and we placed a 7 Khmer by 26 cm stent to the right collecting system.  It had good positioning.  He was awoken and taken to recovery in stable condition.  I did not place a catheter.      Home Wright MD     Date: 5/17/2024  Time: 19:14 EDT

## 2024-05-17 NOTE — CASE MANAGEMENT/SOCIAL WORK
Discharge Planning Assessment  [facility]     Patient Name: Bennie Greene  MRN: [id number]  Today's Date: 5/17/2024    Admit Date: 5/15/2024    Plan: Home with family   Discharge Needs Assessment       Row Name 05/17/24 0940       Living Environment    People in Home spouse;child(chuy), adult    Current Living Arrangements home    Potentially Unsafe Housing Conditions none    Primary Care Provided by self    Provides Primary Care For no one    Family Caregiver if Needed spouse    Quality of Family Relationships helpful;involved;supportive    Able to Return to Prior Arrangements yes       Resource/Environmental Concerns    Resource/Environmental Concerns none    Transportation Concerns none       Transition Planning    Patient/Family Anticipates Transition to home with family    Patient/Family Anticipated Services at Transition none    Transportation Anticipated family or friend will provide       Discharge Needs Assessment    Readmission Within the Last 30 Days no previous admission in last 30 days    Equipment Currently Used at Home none    Concerns to be Addressed no discharge needs identified;denies needs/concerns at this time    Anticipated Changes Related to Illness none    Equipment Needed After Discharge none    Provided Post Acute Provider List? N/A    Provided Post Acute Provider Quality & Resource List? N/A                   Discharge Plan       Row Name 05/17/24 0941       Plan    Plan Home with family    Patient/Family in Agreement with Plan yes    Plan Comments IMM noted. [facility] met with the patient and his wife Diane at bedside with his verbal permission. He confirmed the information on his face sheet was accurate. He said he lives in a tri-level home with his wife and their special needs son. He confirmed his PCP is Travon Shah. He said he has worked with [facility] once in the past. He denied any acute rehab or SNF history. He said he is IADL’s and denied using any DME. 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 He said there are 2 steps at the back door to enter the home and there are handrails on both sides. He is enrolled in the [facility] program. He said he mainly stays on the main level of the home and denied having to go upstairs or down to the basement for anything. He said his son lives in the apartment down in the basement. Wife to transport at discharge. CCP to follow for needs. CD, CSW.                  Continued Care and Services - Admitted Since 5/15/2024    No active coordination exists for this encounter.       Selected Continued Care - Episodes Includes continued care and service providers with selected services from the active episodes listed below      [facility] Episode start date: 1/6/2022   There are no active outsourced providers for this episode.                    Demographic Summary       Row Name 05/17/24 0936       General Information    Admission Type inpatient    Arrived From emergency department    Required Notices Provided Important Message from Medicare    Referral Source admission list    Reason for Consult discharge planning    Preferred Language English                   Functional Status       Row Name 05/17/24 0973       Functional Status    Usual Activity Tolerance good    Current Activity Tolerance moderate       Functional Status, IADL    Medications independent    Meal Preparation independent    Housekeeping independent    Laundry independent    Shopping independent       Mental Status    General Appearance WDL WDL       Mental Status Summary    Recent Changes in Mental Status/Cognitive Functioning no changes       Employment/    Employment Status self-employed    Current or Previous Occupation construction                   Psychosocial    No documentation.                  Abuse/Neglect    No documentation.                  Legal    No documentation.                  Substance Abuse    No documentation.                   Patient Forms    No documentation.

## 2024-05-17 NOTE — SIGNIFICANT NOTE
05/17/24 0728   OTHER   Discipline physical therapist   Therapy Assessment/Plan (PT)   Criteria for Skilled Interventions Met (PT) no problems identified which require skilled intervention  (Pt admitted with UTI.  Pt is up sba with ampac of 23.  No indication for acute skilled PT.  Continue to facilitate supervised activity.)

## 2024-05-17 NOTE — ANESTHESIA POSTPROCEDURE EVALUATION
"Patient: Bennie LOUIS Stone    Procedure Summary       Date: 05/17/24 Room / Location: [facility] OR 01 / [facility] MAIN OR    Anesthesia Start: 1814 Anesthesia Stop: 1904    Procedure: CYSTOSCOPY, RIGHT URETERAL STENT EXCHANGE (Right) Diagnosis:       Hydronephrosis due to obstruction of ureter      (Right Hydronephrosis secondary to Bladder Cancer)    Surgeons: Home Wright MD Provider: Chano Rivers MD    Anesthesia Type: general ASA Status: 3            Anesthesia Type: general    Vitals  Vitals Value Taken Time   /115 05/17/24 1945   Temp 36.6 °C (97.9 °F) 05/17/24 1905   Pulse 85 05/17/24 1950   Resp     SpO2 94 % 05/17/24 1950   Vitals shown include unfiled device data.        Post Anesthesia Care and Evaluation    Level of consciousness: awake and alert  Pain management: adequate    Airway patency: patent  Anesthetic complications: No anesthetic complications  PONV Status: none  Cardiovascular status: acceptable  Respiratory status: acceptable  Hydration status: acceptable    Comments: /94   Pulse 84   Temp 36.6 °C (97.9 °F) (Oral)   Resp 16   Ht 177.8 cm (70\")   Wt 73.1 kg (161 lb 1.6 oz)   SpO2 91%   BMI 23.12 kg/m²       "

## 2024-05-18 LAB
ALBUMIN SERPL-MCNC: 2.8 G/DL (ref 3.5–5.2)
ANION GAP SERPL CALCULATED.3IONS-SCNC: 11.2 MMOL/L (ref 5–15)
BUN SERPL-MCNC: 28 MG/DL (ref 8–23)
BUN/CREAT SERPL: 14.4 (ref 7–25)
CALCIUM SPEC-SCNC: 8.7 MG/DL (ref 8.6–10.5)
CHLORIDE SERPL-SCNC: 109 MMOL/L (ref 98–107)
CO2 SERPL-SCNC: 20.8 MMOL/L (ref 22–29)
CREAT SERPL-MCNC: 1.94 MG/DL (ref 0.76–1.27)
DEPRECATED RDW RBC AUTO: 41 FL (ref 37–54)
EGFRCR SERPLBLD CKD-EPI 2021: 33.9 ML/MIN/1.73
ERYTHROCYTE [DISTWIDTH] IN BLOOD BY AUTOMATED COUNT: 13.6 % (ref 12.3–15.4)
GLUCOSE BLDC GLUCOMTR-MCNC: 117 MG/DL (ref 70–130)
GLUCOSE BLDC GLUCOMTR-MCNC: 209 MG/DL (ref 70–130)
GLUCOSE BLDC GLUCOMTR-MCNC: 75 MG/DL (ref 70–130)
GLUCOSE BLDC GLUCOMTR-MCNC: 95 MG/DL (ref 70–130)
GLUCOSE SERPL-MCNC: 65 MG/DL (ref 65–99)
HCT VFR BLD AUTO: 34.2 % (ref 37.5–51)
HGB BLD-MCNC: 10.8 G/DL (ref 13–17.7)
MAGNESIUM SERPL-MCNC: 1.9 MG/DL (ref 1.6–2.4)
MCH RBC QN AUTO: 26.2 PG (ref 26.6–33)
MCHC RBC AUTO-ENTMCNC: 31.6 G/DL (ref 31.5–35.7)
MCV RBC AUTO: 83 FL (ref 79–97)
PHOSPHATE SERPL-MCNC: 4.2 MG/DL (ref 2.5–4.5)
PLATELET # BLD AUTO: 377 10*3/MM3 (ref 140–450)
PMV BLD AUTO: 9.1 FL (ref 6–12)
POTASSIUM SERPL-SCNC: 4.4 MMOL/L (ref 3.5–5.2)
RBC # BLD AUTO: 4.12 10*6/MM3 (ref 4.14–5.8)
SODIUM SERPL-SCNC: 141 MMOL/L (ref 136–145)
WBC NRBC COR # BLD AUTO: 16.36 10*3/MM3 (ref 3.4–10.8)

## 2024-05-18 PROCEDURE — 80069 RENAL FUNCTION PANEL: CPT | Performed by: UROLOGY

## 2024-05-18 PROCEDURE — 82948 REAGENT STRIP/BLOOD GLUCOSE: CPT

## 2024-05-18 PROCEDURE — 83735 ASSAY OF MAGNESIUM: CPT | Performed by: UROLOGY

## 2024-05-18 PROCEDURE — 63710000001 INSULIN LISPRO (HUMAN) PER 5 UNITS: Performed by: UROLOGY

## 2024-05-18 PROCEDURE — 25810000003 SODIUM CHLORIDE 0.9 % SOLUTION: Performed by: UROLOGY

## 2024-05-18 PROCEDURE — 25010000002 HYDROMORPHONE 1 MG/ML SOLUTION: Performed by: UROLOGY

## 2024-05-18 PROCEDURE — 25010000002 ENOXAPARIN PER 10 MG: Performed by: UROLOGY

## 2024-05-18 PROCEDURE — 25010000002 CEFTRIAXONE PER 250 MG: Performed by: UROLOGY

## 2024-05-18 PROCEDURE — 85027 COMPLETE CBC AUTOMATED: CPT | Performed by: UROLOGY

## 2024-05-18 RX ADMIN — HYDROMORPHONE HYDROCHLORIDE 1 MG: 1 INJECTION, SOLUTION INTRAMUSCULAR; INTRAVENOUS; SUBCUTANEOUS at 16:00

## 2024-05-18 RX ADMIN — PANTOPRAZOLE SODIUM 40 MG: 40 TABLET, DELAYED RELEASE ORAL at 09:29

## 2024-05-18 RX ADMIN — ENOXAPARIN SODIUM 40 MG: 100 INJECTION SUBCUTANEOUS at 15:53

## 2024-05-18 RX ADMIN — TAMSULOSIN HYDROCHLORIDE 0.4 MG: 0.4 CAPSULE ORAL at 09:28

## 2024-05-18 RX ADMIN — Medication 1 CAPSULE: at 09:28

## 2024-05-18 RX ADMIN — Medication 10 ML: at 21:41

## 2024-05-18 RX ADMIN — SODIUM CHLORIDE 100 ML/HR: 9 INJECTION, SOLUTION INTRAVENOUS at 16:01

## 2024-05-18 RX ADMIN — Medication 10 ML: at 09:29

## 2024-05-18 RX ADMIN — HYDROCODONE BITARTRATE AND ACETAMINOPHEN 1 TABLET: 5; 325 TABLET ORAL at 07:09

## 2024-05-18 RX ADMIN — LEVOTHYROXINE SODIUM 50 MCG: 50 TABLET ORAL at 09:29

## 2024-05-18 RX ADMIN — INSULIN LISPRO 3 UNITS: 100 INJECTION, SOLUTION INTRAVENOUS; SUBCUTANEOUS at 11:56

## 2024-05-18 RX ADMIN — CEFTRIAXONE SODIUM 1000 MG: 1 INJECTION, POWDER, FOR SOLUTION INTRAMUSCULAR; INTRAVENOUS at 11:55

## 2024-05-18 NOTE — PROGRESS NOTES
POD 1 for right stent change (malpositioned) with Dr. ASHWIN Wright     UTI/ peylo   On rocephin   'Feeling much better today'    MENDOZA   Cr 1.94 this AM, (1.93)    WBC- 16.36 (9.11, 11.82, 14)      Per notes, concern from family yesterday for low urine output   States voiding more today but does not feel empties well   He takes Flomax     PVR 466mL     Avss   No complaints today     Plan:  Will see if he is able to void once he gets up and walks, if not we will straight cath him   Continue Flomax   Continue rocephin  Continue to monitor Cr.  Will continue to follow

## 2024-05-18 NOTE — PROGRESS NOTES
Name: Bennie Greene ADMIT: 5/15/2024   : 1940  PCP: Travon Shah MD    MRN: [id number] LOS: 3 days   AGE/SEX: 83 y.o. male  ROOM: [facility]     Subjective   Subjective   Chief Complaint   Patient presents with    Hyperglycemia     No CP SOA NVD.  Pain improved compared to yesterday.  Still having difficulty voiding.     Objective   Objective   Vital Signs  Temp:  [97.9 °F (36.6 °C)-98.8 °F (37.1 °C)] 98.2 °F (36.8 °C)  Heart Rate:  [] 67  Resp:  [16-18] 18  BP: (135-182)/() 147/73  SpO2:  [91 %-97 %] 94 %  on   ;   Device (Oxygen Therapy): room air  Body mass index is 23.12 kg/m².    Physical Exam  Vitals and nursing note reviewed.   Constitutional:       Appearance: He is not diaphoretic.   HENT:      Head: Atraumatic.   Cardiovascular:      Rate and Rhythm: Normal rate and regular rhythm.      Pulses: Normal pulses.   Pulmonary:      Effort: Pulmonary effort is normal.      Breath sounds: No wheezing.   Abdominal:      General: There is no distension.      Palpations: Abdomen is soft.      Tenderness: There is abdominal tenderness. There is no guarding or rebound.   Musculoskeletal:         General: No tenderness.   Skin:     General: Skin is warm and dry.   Neurological:      Mental Status: He is alert. Mental status is at baseline.   Psychiatric:         Mood and Affect: Mood normal.         Behavior: Behavior normal.       Results Review  I reviewed the patient's new clinical results.    Results from last 7 days   Lab Units 24  0639 24  0351 24  0441 05/15/24  1026   WBC 10*3/mm3 16.36* 9.11 11.82* 14.00*   HEMOGLOBIN g/dL 10.8* 9.5* 9.7* 11.1*   PLATELETS 10*3/mm3 377 312 334 336     Results from last 7 days   Lab Units 24  0639 24  0351 24  0441 05/15/24  1026   SODIUM mmol/L 141 141 141 139   POTASSIUM mmol/L 4.4 4.0 3.8 4.5   CHLORIDE mmol/L 109* 110* 111* 104   CO2 mmol/L 20.8* 19.0* 21.2* 24.0   BUN mg/dL 28* 32* 29* 36*   CREATININE mg/dL  1.94* 1.93* 1.65* 1.77*   GLUCOSE mg/dL 65 60* 66 326*   EGFR mL/min/1.73 33.9* 34.1* 41.2* 37.9*     Results from last 7 days   Lab Units 05/18/24  0639 05/17/24  0351 05/16/24  0441 05/15/24  1026   ALBUMIN g/dL 2.8* 2.5* 2.6* 3.3*   BILIRUBIN mg/dL  --  0.2 0.3 0.3   ALK PHOS U/L  --  109 121* 150*   AST (SGOT) U/L  --  19 21 13   ALT (SGPT) U/L  --  23 21 20     Results from last 7 days   Lab Units 05/18/24  0639 05/17/24  0351 05/16/24  0441 05/15/24  1026   CALCIUM mg/dL 8.7 8.2* 8.6 9.1   ALBUMIN g/dL 2.8* 2.5* 2.6* 3.3*   MAGNESIUM mg/dL 1.9 2.0  --   --    PHOSPHORUS mg/dL 4.2 3.8  --   --      Results from last 7 days   Lab Units 05/15/24  1026   LACTATE mmol/L 0.9     Hemoglobin A1C   Date/Time Value Ref Range Status   05/16/2024 0441 10.50 (H) 4.80 - 5.60 % Final     Glucose   Date/Time Value Ref Range Status   05/18/2024 1120 209 (H) 70 - 130 mg/dL Final   05/18/2024 0608 75 70 - 130 mg/dL Final   05/17/2024 2051 100 70 - 130 mg/dL Final   05/17/2024 1907 99 70 - 130 mg/dL Final   05/17/2024 1627 68 (L) 70 - 130 mg/dL Final   05/17/2024 1201 71 70 - 130 mg/dL Final   05/17/2024 0637 160 (H) 70 - 130 mg/dL Final       No radiology results for the last day    I have personally reviewed all medications:  Scheduled Medications  cefTRIAXone, 1,000 mg, Intravenous, Q24H  enoxaparin, 40 mg, Subcutaneous, Q24H  insulin lispro, 2-7 Units, Subcutaneous, 4x Daily AC & at Bedtime  lactobacillus acidophilus, 1 capsule, Oral, Daily  levothyroxine, 50 mcg, Oral, Daily  pantoprazole, 40 mg, Oral, Daily  sodium chloride, 10 mL, Intravenous, Q12H  tamsulosin, 0.4 mg, Oral, Daily      Infusions  sodium chloride, 100 mL/hr, Last Rate: 100 mL/hr (05/17/24 1814)      Diet  Diet: Regular/House; Fluid Consistency: Thin (IDDSI 0)    I have personally reviewed:  [x]  Laboratory   [x]  Microbiology   [x]  Radiology   [x]  EKG/Telemetry  []  Cardiology/Vascular   []  Pathology    []  Records       Assessment/Plan     Active  Hospital Problems    Diagnosis  POA    **Acute UTI [N39.0]  Yes    Bladder cancer [C67.9]  Yes    Gastroesophageal reflux disease without esophagitis [K21.9]  Yes    Acquired hypothyroidism [E03.9]  Yes    Type 2 diabetes mellitus with hyperglycemia, with long-term current use of insulin [E11.65, Z79.4]  Not Applicable      Resolved Hospital Problems   No resolved problems to display.       83 y.o. male admitted with Acute UTI.    Pyelonephritis: Continue rocephin. UCx was mixed. BCx ngtd.  Bladder Cancer with Obstructive Symptom Hx: Stage 2 per outpatient provider.  Malpositioning of right ureteral stent on CT.  Status post cystoscopy and right ureteral stent exchange 5/17. Urology following.  MENDOZA: Creatinine plateaued.  Continue fluids and will monitor.  Alk Phos: Elevation resolved. GGT was not elevated. Would defer additional cancer staging to his home oncologist unless other acute issues arise.  DM2: A1c 10.3.  Patient has had borderline low morning glucoses.  Lantus was stopped.  SSI and monitor requirements.  Hypothyroidism: Synthroid  PPX: Lovenox  Disposition: Home/few days    Expected Discharge Date: 5/19/2024; Expected Discharge Time:      Francisco Acosta MD  [facility]  05/18/24  13:04 EDT    Dictated portions of note using Dragon dictation software.  Copied text in this note has been reviewed by me and remains accurate as of 05/18/24

## 2024-05-18 NOTE — PLAN OF CARE
Goal Outcome Evaluation:  AOX4. VSS. Room air. Meds given per MAR. Treatment given per MD orders.

## 2024-05-18 NOTE — PLAN OF CARE
Goal Outcome Evaluation:              Outcome Evaluation: pt aox4, no c/o pain, VSS on RA will CTM the rest of my shift

## 2024-05-19 LAB
ANION GAP SERPL CALCULATED.3IONS-SCNC: 12 MMOL/L (ref 5–15)
BUN SERPL-MCNC: 28 MG/DL (ref 8–23)
BUN/CREAT SERPL: 16.5 (ref 7–25)
CALCIUM SPEC-SCNC: 8.6 MG/DL (ref 8.6–10.5)
CHLORIDE SERPL-SCNC: 108 MMOL/L (ref 98–107)
CO2 SERPL-SCNC: 21 MMOL/L (ref 22–29)
CREAT SERPL-MCNC: 1.7 MG/DL (ref 0.76–1.27)
DEPRECATED RDW RBC AUTO: 39.8 FL (ref 37–54)
EGFRCR SERPLBLD CKD-EPI 2021: 39.8 ML/MIN/1.73
ERYTHROCYTE [DISTWIDTH] IN BLOOD BY AUTOMATED COUNT: 13.3 % (ref 12.3–15.4)
GLUCOSE BLDC GLUCOMTR-MCNC: 118 MG/DL (ref 70–130)
GLUCOSE BLDC GLUCOMTR-MCNC: 195 MG/DL (ref 70–130)
GLUCOSE BLDC GLUCOMTR-MCNC: 207 MG/DL (ref 70–130)
GLUCOSE BLDC GLUCOMTR-MCNC: 87 MG/DL (ref 70–130)
GLUCOSE SERPL-MCNC: 68 MG/DL (ref 65–99)
HCT VFR BLD AUTO: 33.6 % (ref 37.5–51)
HGB BLD-MCNC: 10.6 G/DL (ref 13–17.7)
MCH RBC QN AUTO: 25.8 PG (ref 26.6–33)
MCHC RBC AUTO-ENTMCNC: 31.5 G/DL (ref 31.5–35.7)
MCV RBC AUTO: 81.8 FL (ref 79–97)
PLATELET # BLD AUTO: 439 10*3/MM3 (ref 140–450)
PMV BLD AUTO: 9.5 FL (ref 6–12)
POTASSIUM SERPL-SCNC: 4.5 MMOL/L (ref 3.5–5.2)
RBC # BLD AUTO: 4.11 10*6/MM3 (ref 4.14–5.8)
SODIUM SERPL-SCNC: 141 MMOL/L (ref 136–145)
WBC NRBC COR # BLD AUTO: 12.71 10*3/MM3 (ref 3.4–10.8)

## 2024-05-19 PROCEDURE — 25010000002 HYDROMORPHONE 1 MG/ML SOLUTION: Performed by: UROLOGY

## 2024-05-19 PROCEDURE — 85027 COMPLETE CBC AUTOMATED: CPT | Performed by: INTERNAL MEDICINE

## 2024-05-19 PROCEDURE — 25010000002 PROCHLORPERAZINE 10 MG/2ML SOLUTION: Performed by: INTERNAL MEDICINE

## 2024-05-19 PROCEDURE — 63710000001 INSULIN LISPRO (HUMAN) PER 5 UNITS: Performed by: UROLOGY

## 2024-05-19 PROCEDURE — 25010000002 ENOXAPARIN PER 10 MG: Performed by: UROLOGY

## 2024-05-19 PROCEDURE — 25010000002 ONDANSETRON PER 1 MG: Performed by: UROLOGY

## 2024-05-19 PROCEDURE — 82948 REAGENT STRIP/BLOOD GLUCOSE: CPT

## 2024-05-19 PROCEDURE — 80048 BASIC METABOLIC PNL TOTAL CA: CPT | Performed by: INTERNAL MEDICINE

## 2024-05-19 PROCEDURE — 25010000002 CEFTRIAXONE PER 250 MG: Performed by: INTERNAL MEDICINE

## 2024-05-19 PROCEDURE — 63710000001 ONDANSETRON ODT 4 MG TABLET DISPERSIBLE: Performed by: UROLOGY

## 2024-05-19 RX ORDER — PROCHLORPERAZINE EDISYLATE 5 MG/ML
5 INJECTION INTRAMUSCULAR; INTRAVENOUS EVERY 6 HOURS PRN
Status: DISCONTINUED | OUTPATIENT
Start: 2024-05-19 | End: 2024-05-20 | Stop reason: HOSPADM

## 2024-05-19 RX ADMIN — ENOXAPARIN SODIUM 40 MG: 100 INJECTION SUBCUTANEOUS at 15:33

## 2024-05-19 RX ADMIN — CEFTRIAXONE SODIUM 1000 MG: 1 INJECTION, POWDER, FOR SOLUTION INTRAMUSCULAR; INTRAVENOUS at 12:14

## 2024-05-19 RX ADMIN — HYDROMORPHONE HYDROCHLORIDE 1 MG: 1 INJECTION, SOLUTION INTRAMUSCULAR; INTRAVENOUS; SUBCUTANEOUS at 21:42

## 2024-05-19 RX ADMIN — PROCHLORPERAZINE EDISYLATE 5 MG: 5 INJECTION INTRAMUSCULAR; INTRAVENOUS at 12:13

## 2024-05-19 RX ADMIN — Medication 10 ML: at 21:33

## 2024-05-19 RX ADMIN — INSULIN LISPRO 2 UNITS: 100 INJECTION, SOLUTION INTRAVENOUS; SUBCUTANEOUS at 11:24

## 2024-05-19 RX ADMIN — INSULIN LISPRO 3 UNITS: 100 INJECTION, SOLUTION INTRAVENOUS; SUBCUTANEOUS at 21:23

## 2024-05-19 RX ADMIN — PANTOPRAZOLE SODIUM 40 MG: 40 TABLET, DELAYED RELEASE ORAL at 08:44

## 2024-05-19 RX ADMIN — ONDANSETRON 4 MG: 4 TABLET, ORALLY DISINTEGRATING ORAL at 08:50

## 2024-05-19 RX ADMIN — Medication 1 CAPSULE: at 08:44

## 2024-05-19 RX ADMIN — SODIUM CHLORIDE 100 ML/HR: 9 INJECTION, SOLUTION INTRAVENOUS at 19:10

## 2024-05-19 RX ADMIN — TAMSULOSIN HYDROCHLORIDE 0.4 MG: 0.4 CAPSULE ORAL at 08:44

## 2024-05-19 RX ADMIN — BISACODYL 5 MG: 5 TABLET, COATED ORAL at 21:23

## 2024-05-19 RX ADMIN — LEVOTHYROXINE SODIUM 50 MCG: 50 TABLET ORAL at 08:44

## 2024-05-19 RX ADMIN — ONDANSETRON 4 MG: 2 INJECTION INTRAMUSCULAR; INTRAVENOUS at 21:23

## 2024-05-19 NOTE — PROGRESS NOTES
POD 2 right stent change (malpositioned) by Dr. Kyle SANCHEZ     Bladder cancer     UTI/ peylo   On Rocephin   No flank pain, no pain meds since yesterday  Nauseated this AM , no vomiting   With some abd cramping  BM 1-2 days ago, states not his normal consistency     MENDOZA   Cr trending down     Incomplete emptying   On Flomax   PVR yesterday were between 300-500mL  Nursing attempted to striaght cath with straight and were unable   Repeat PVR after void showed PVR 300mL, decided not to cath    CBC:      Lab 05/19/24  0431 05/18/24  0639 05/17/24  0351 05/16/24  0441 05/15/24  1026   WBC 12.71* 16.36* 9.11 11.82* 14.00*   HEMOGLOBIN 10.6* 10.8* 9.5* 9.7* 11.1*   HEMATOCRIT 33.6* 34.2* 30.3* 30.0* 35.9*   PLATELETS 439 377 312 334 336   NEUTROS ABS  --   --   --  9.41* 12.14*   IMMATURE GRANS (ABS)  --   --   --  0.12* 0.08*   LYMPHS ABS  --   --   --  1.15 0.75   MONOS ABS  --   --   --  0.98* 0.95*   EOS ABS  --   --   --  0.13 0.04   MCV 81.8 83.0 85.4 81.5 85.3     BMP          5/17/2024    03:51 5/18/2024    06:39 5/19/2024    04:31   BMP   BUN 32  28  28    Creatinine 1.93  1.94  1.70    Sodium 141  141  141    Potassium 4.0  4.4  4.5    Chloride 110  109  108    CO2 19.0  20.8  21.0    Calcium 8.2  8.7  8.6         Plan:  Continue to monitor PVR's   Continue rocephin    Will continue to follow

## 2024-05-19 NOTE — PLAN OF CARE
Goal Outcome Evaluation:  Plan of Care Reviewed With: patient        Progress: improving     Patient was able to void spontaneously. Random bladder scan was 307. Patient reports dysuria and nausea. IV antibiotics given as ordered. Nausea improved following Compazine. No other acute changes this shift.

## 2024-05-19 NOTE — PROGRESS NOTES
Name: Bennie Greene ADMIT: 5/15/2024   : 1940  PCP: Travon Shah MD    MRN: [id number] LOS: 4 days   AGE/SEX: 83 y.o. male  ROOM: [facility]     Subjective   Subjective   Chief Complaint   Patient presents with    Hyperglycemia     No CP SOA NVD.  Pain present lower abdomen. Having nausea. Had BM yesterday.     Objective   Objective   Vital Signs  Temp:  [98.2 °F (36.8 °C)-98.8 °F (37.1 °C)] 98.3 °F (36.8 °C)  Heart Rate:  [68-81] 68  Resp:  [18] 18  BP: (139-149)/(73-82) 146/77  SpO2:  [96 %] 96 %  on   ;   Device (Oxygen Therapy): room air  Body mass index is 23.12 kg/m².    Physical Exam  Vitals and nursing note reviewed.   Constitutional:       Appearance: He is not diaphoretic.   HENT:      Head: Atraumatic.   Cardiovascular:      Rate and Rhythm: Normal rate and regular rhythm.      Pulses: Normal pulses.   Pulmonary:      Effort: Pulmonary effort is normal.      Breath sounds: No wheezing.   Abdominal:      General: There is no distension.      Palpations: Abdomen is soft.      Tenderness: There is abdominal tenderness. There is no guarding or rebound.   Musculoskeletal:         General: No tenderness.   Skin:     General: Skin is warm and dry.   Neurological:      Mental Status: He is alert. Mental status is at baseline.   Psychiatric:         Mood and Affect: Mood normal.         Behavior: Behavior normal.       Results Review  I reviewed the patient's new clinical results.    Results from last 7 days   Lab Units 24  0431 24  0639 24  0351 24  0441   WBC 10*3/mm3 12.71* 16.36* 9.11 11.82*   HEMOGLOBIN g/dL 10.6* 10.8* 9.5* 9.7*   PLATELETS 10*3/mm3 439 377 312 334     Results from last 7 days   Lab Units 24  0431 24  0639 24  0351 24  0441   SODIUM mmol/L 141 141 141 141   POTASSIUM mmol/L 4.5 4.4 4.0 3.8   CHLORIDE mmol/L 108* 109* 110* 111*   CO2 mmol/L 21.0* 20.8* 19.0* 21.2*   BUN mg/dL 28* 28* 32* 29*   CREATININE mg/dL 1.70* 1.94* 1.93*  1.65*   GLUCOSE mg/dL 68 65 60* 66   EGFR mL/min/1.73 39.8* 33.9* 34.1* 41.2*     Results from last 7 days   Lab Units 05/18/24  0639 05/17/24  0351 05/16/24  0441 05/15/24  1026   ALBUMIN g/dL 2.8* 2.5* 2.6* 3.3*   BILIRUBIN mg/dL  --  0.2 0.3 0.3   ALK PHOS U/L  --  109 121* 150*   AST (SGOT) U/L  --  19 21 13   ALT (SGPT) U/L  --  23 21 20     Results from last 7 days   Lab Units 05/19/24  0431 05/18/24  0639 05/17/24  0351 05/16/24  0441 05/15/24  1026   CALCIUM mg/dL 8.6 8.7 8.2* 8.6 9.1   ALBUMIN g/dL  --  2.8* 2.5* 2.6* 3.3*   MAGNESIUM mg/dL  --  1.9 2.0  --   --    PHOSPHORUS mg/dL  --  4.2 3.8  --   --      Results from last 7 days   Lab Units 05/15/24  1026   LACTATE mmol/L 0.9     Glucose   Date/Time Value Ref Range Status   05/19/2024 1106 195 (H) 70 - 130 mg/dL Final   05/19/2024 0602 87 70 - 130 mg/dL Final   05/18/2024 2030 95 70 - 130 mg/dL Final   05/18/2024 1601 117 70 - 130 mg/dL Final   05/18/2024 1120 209 (H) 70 - 130 mg/dL Final   05/18/2024 0608 75 70 - 130 mg/dL Final   05/17/2024 2051 100 70 - 130 mg/dL Final       No radiology results for the last day    I have personally reviewed all medications:  Scheduled Medications  cefTRIAXone, 1,000 mg, Intravenous, Q24H  enoxaparin, 40 mg, Subcutaneous, Q24H  insulin lispro, 2-7 Units, Subcutaneous, 4x Daily AC & at Bedtime  lactobacillus acidophilus, 1 capsule, Oral, Daily  levothyroxine, 50 mcg, Oral, Daily  pantoprazole, 40 mg, Oral, Daily  sodium chloride, 10 mL, Intravenous, Q12H  tamsulosin, 0.4 mg, Oral, Daily      Infusions  sodium chloride, 100 mL/hr, Last Rate: 100 mL/hr (05/18/24 1601)      Diet  Diet: Diabetic; Consistent Carbohydrate; Fluid Consistency: Thin (IDDSI 0)    I have personally reviewed:  [x]  Laboratory   []  Microbiology   []  Radiology   []  EKG/Telemetry  []  Cardiology/Vascular   []  Pathology    []  Records       Assessment/Plan     Active Hospital Problems    Diagnosis  POA    **Acute UTI [N39.0]  Yes    Bladder  cancer [C67.9]  Yes    Gastroesophageal reflux disease without esophagitis [K21.9]  Yes    Acquired hypothyroidism [E03.9]  Yes    Type 2 diabetes mellitus with hyperglycemia, with long-term current use of insulin [E11.65, Z79.4]  Not Applicable      Resolved Hospital Problems   No resolved problems to display.       83 y.o. male admitted with Acute UTI.    Pyelonephritis: Continue rocephin. UCx was mixed. BCx ngtd.  Bladder Cancer with Obstructive Symptom Hx: Stage 2 per outpatient provider.  Malpositioning of right ureteral stent on CT.  Status post cystoscopy and right ureteral stent exchange 5/17. Urology following.  MENDOZA: Creatinine plateaued and improving some after stent revision.  Continue fluids.  Alk Phos: Elevation resolved. GGT was not elevated. Would defer additional cancer staging to his home oncologist unless other acute issues arise.  DM2: A1c 10.3.  Hypoglycemia issues. Home lantus was stopped. SSI continues. Monitoring requirements.  Hypothyroidism: Synthroid  PPX: Lovenox  Disposition: Home/few days    Expected Discharge Date: 5/19/2024; Expected Discharge Time:      Francisco Acosta MD  [facility]  05/19/24  12:47 EDT    Dictated portions of note using Dragon dictation software.  Copied text in this note has been reviewed by me and remains accurate as of 05/19/24

## 2024-05-20 ENCOUNTER — TELEPHONE (OUTPATIENT)
Dept: INTERNAL MEDICINE | Facility: CLINIC | Age: 84
End: 2024-05-20
Payer: COMMERCIAL

## 2024-05-20 ENCOUNTER — READMISSION MANAGEMENT (OUTPATIENT)
Dept: CALL CENTER | Facility: HOSPITAL | Age: 84
End: 2024-05-20
Payer: COMMERCIAL

## 2024-05-20 VITALS
WEIGHT: 161.1 LBS | RESPIRATION RATE: 17 BRPM | HEIGHT: 70 IN | BODY MASS INDEX: 23.06 KG/M2 | TEMPERATURE: 97.7 F | OXYGEN SATURATION: 99 % | SYSTOLIC BLOOD PRESSURE: 169 MMHG | DIASTOLIC BLOOD PRESSURE: 81 MMHG | HEART RATE: 61 BPM

## 2024-05-20 PROBLEM — N10 ACUTE PYELONEPHRITIS: Status: ACTIVE | Noted: 2024-05-20

## 2024-05-20 LAB
ALBUMIN SERPL-MCNC: 2.6 G/DL (ref 3.5–5.2)
ANION GAP SERPL CALCULATED.3IONS-SCNC: 8.9 MMOL/L (ref 5–15)
BACTERIA SPEC AEROBE CULT: NORMAL
BACTERIA SPEC AEROBE CULT: NORMAL
BUN SERPL-MCNC: 22 MG/DL (ref 8–23)
BUN/CREAT SERPL: 14.9 (ref 7–25)
CALCIUM SPEC-SCNC: 8.4 MG/DL (ref 8.6–10.5)
CHLORIDE SERPL-SCNC: 107 MMOL/L (ref 98–107)
CO2 SERPL-SCNC: 23.1 MMOL/L (ref 22–29)
CREAT SERPL-MCNC: 1.48 MG/DL (ref 0.76–1.27)
DEPRECATED RDW RBC AUTO: 40.8 FL (ref 37–54)
EGFRCR SERPLBLD CKD-EPI 2021: 46.9 ML/MIN/1.73
ERYTHROCYTE [DISTWIDTH] IN BLOOD BY AUTOMATED COUNT: 13.4 % (ref 12.3–15.4)
GLUCOSE BLDC GLUCOMTR-MCNC: 149 MG/DL (ref 70–130)
GLUCOSE BLDC GLUCOMTR-MCNC: 95 MG/DL (ref 70–130)
GLUCOSE SERPL-MCNC: 92 MG/DL (ref 65–99)
HCT VFR BLD AUTO: 28.5 % (ref 37.5–51)
HGB BLD-MCNC: 9 G/DL (ref 13–17.7)
MAGNESIUM SERPL-MCNC: 1.9 MG/DL (ref 1.6–2.4)
MCH RBC QN AUTO: 26.2 PG (ref 26.6–33)
MCHC RBC AUTO-ENTMCNC: 31.6 G/DL (ref 31.5–35.7)
MCV RBC AUTO: 83.1 FL (ref 79–97)
PHOSPHATE SERPL-MCNC: 3.4 MG/DL (ref 2.5–4.5)
PLATELET # BLD AUTO: 388 10*3/MM3 (ref 140–450)
PMV BLD AUTO: 9.3 FL (ref 6–12)
POTASSIUM SERPL-SCNC: 4.3 MMOL/L (ref 3.5–5.2)
RBC # BLD AUTO: 3.43 10*6/MM3 (ref 4.14–5.8)
SODIUM SERPL-SCNC: 139 MMOL/L (ref 136–145)
WBC NRBC COR # BLD AUTO: 10.44 10*3/MM3 (ref 3.4–10.8)

## 2024-05-20 PROCEDURE — 85027 COMPLETE CBC AUTOMATED: CPT | Performed by: INTERNAL MEDICINE

## 2024-05-20 PROCEDURE — 25010000002 ONDANSETRON PER 1 MG: Performed by: UROLOGY

## 2024-05-20 PROCEDURE — 82948 REAGENT STRIP/BLOOD GLUCOSE: CPT

## 2024-05-20 PROCEDURE — 83735 ASSAY OF MAGNESIUM: CPT | Performed by: INTERNAL MEDICINE

## 2024-05-20 PROCEDURE — 80069 RENAL FUNCTION PANEL: CPT | Performed by: INTERNAL MEDICINE

## 2024-05-20 PROCEDURE — 25810000003 SODIUM CHLORIDE 0.9 % SOLUTION: Performed by: UROLOGY

## 2024-05-20 PROCEDURE — 25010000002 CEFTRIAXONE PER 250 MG: Performed by: INTERNAL MEDICINE

## 2024-05-20 RX ORDER — CEPHALEXIN 500 MG/1
500 CAPSULE ORAL 3 TIMES DAILY
Qty: 15 CAPSULE | Refills: 0 | Status: SHIPPED | OUTPATIENT
Start: 2024-05-20 | End: 2024-05-25

## 2024-05-20 RX ADMIN — ONDANSETRON 4 MG: 2 INJECTION INTRAMUSCULAR; INTRAVENOUS at 03:45

## 2024-05-20 RX ADMIN — TAMSULOSIN HYDROCHLORIDE 0.4 MG: 0.4 CAPSULE ORAL at 08:17

## 2024-05-20 RX ADMIN — SODIUM CHLORIDE 100 ML/HR: 9 INJECTION, SOLUTION INTRAVENOUS at 05:17

## 2024-05-20 RX ADMIN — SENNOSIDES AND DOCUSATE SODIUM 2 TABLET: 50; 8.6 TABLET ORAL at 08:17

## 2024-05-20 RX ADMIN — CEFTRIAXONE SODIUM 1000 MG: 1 INJECTION, POWDER, FOR SOLUTION INTRAMUSCULAR; INTRAVENOUS at 11:43

## 2024-05-20 RX ADMIN — Medication 1 CAPSULE: at 08:17

## 2024-05-20 RX ADMIN — HYDROCODONE BITARTRATE AND ACETAMINOPHEN 1 TABLET: 5; 325 TABLET ORAL at 03:55

## 2024-05-20 RX ADMIN — PANTOPRAZOLE SODIUM 40 MG: 40 TABLET, DELAYED RELEASE ORAL at 08:17

## 2024-05-20 RX ADMIN — LEVOTHYROXINE SODIUM 50 MCG: 50 TABLET ORAL at 08:17

## 2024-05-20 RX ADMIN — HYDROCODONE BITARTRATE AND ACETAMINOPHEN 1 TABLET: 5; 325 TABLET ORAL at 08:17

## 2024-05-20 NOTE — PLAN OF CARE
Goal Outcome Evaluation:           Progress: no change       Aox4. VSS. Room air. No acute changes throughout shift.

## 2024-05-20 NOTE — DISCHARGE SUMMARY
Patient Name: Bennie Greene  : 1940  MRN: [id number]    Date of Admission: 5/15/2024  Date of Discharge:  2024  Primary Care Physician: Travon Shah MD      Chief Complaint:   Hyperglycemia      Discharge Diagnoses     Active Hospital Problems    Diagnosis  POA    **Acute UTI [N39.0]  Yes    Acute pyelonephritis [N10]  Unknown    Bladder cancer [C67.9]  Yes    Gastroesophageal reflux disease without esophagitis [K21.9]  Yes    Acquired hypothyroidism [E03.9]  Yes    Type 2 diabetes mellitus with hyperglycemia, with long-term current use of insulin [E11.65, Z79.4]  Not Applicable      Resolved Hospital Problems   No resolved problems to display.        Hospital Course     Mr. Greene is a 83 y.o. male with a history of bladder cancer, hypothyroidism, type 2 diabetes and reflux who presented to [facility] initially complaining of trouble with his blood sugar.  Please see the admitting history and physical for further details.  He was found to have urinary tract infection and was admitted to the hospital for further evaluation and treatment.  Ultimately after further workup it was determined he had pyelonephritis and urinary tract infection.  He was started on Rocephin and responded well to antibiotic therapy.  He has bladder cancer ureteral obstruction and had a stent exchange during this hospitalization.  You need to follow-up with urology in the outpatient setting.  He is responded well to antibiotic therapy and will be discharged on Keflex.  His blood sugars were labile during this hospitalization and suspect he likely has some degree of insulin resistance and some dietary indiscretion at home.  We have restarted his insulin on discharge.  Day of Discharge     Subjective:  Denies new issues this morning wants to go home.  No fevers or chills he is up and ambulating the room independently    Physical Exam:  Temp:  [98 °F (36.7 °C)-98.6 °F (37 °C)] 98.6 °F (37 °C)  Heart Rate:   [60-77] 60  Resp:  [18] 18  BP: (134-144)/(49-79) 141/49  Body mass index is 23.12 kg/m².  Physical Exam  Vitals and nursing note reviewed.   Constitutional:       General: He is not in acute distress.     Appearance: He is ill-appearing.   Cardiovascular:      Rate and Rhythm: Normal rate and regular rhythm.   Pulmonary:      Effort: No respiratory distress.      Breath sounds: Normal breath sounds.   Abdominal:      General: Bowel sounds are normal. There is no distension.      Palpations: Abdomen is soft.   Skin:     General: Skin is warm and dry.   Neurological:      General: No focal deficit present.      Mental Status: He is alert. Mental status is at baseline.         Consultants     Consult Orders (all) (From admission, onward)       Start     Ordered    05/16/24 1426  Inpatient Urology Consult  Once        Specialty:  Urology  Provider:  Home Muir MD    05/16/24 1425    05/16/24 1419  Inpatient Urology Consult  Once,   Status:  Canceled        Specialty:  Urology  Provider:  (Not yet assigned)    05/16/24 1422    05/15/24 1515  Inpatient Nutrition Consult  Once        Provider:  (Not yet assigned)    05/15/24 1515    05/15/24 1129  [facility] (on-call MD unless specified) Details  Once        Specialty:  Hospitalist  Provider:  Francisco Acosta MD    05/15/24 1128                  Procedures     CYSTOSCOPY, RIGHT URETERAL STENT EXCHANGE    Imaging Results (All)       Procedure Component Value Units Date/Time    FL Retrograde Pyelogram In OR [[id number]] Collected: 05/18/24 0047     Updated: 05/18/24 0051    Narrative:      INTRAOPERATIVE PORTABLE VIEW ABDOMEN     CLINICAL INFORMATION: Post cystogram     FINDINGS: A stent catheter overlies the right hemiabdomen on the final  image.     Dose: Ka,r 5.10 mGy         This report was finalized on 5/18/2024 12:48 AM by Dr. Bronson Hale M.D on Workstation: [id number]       CT Abdomen Pelvis Without Contrast [[id number]] Collected: 05/16/24 2640      Updated: 05/16/24 1301    Narrative:      CT ABDOMEN PELVIS WO CONTRAST-     HISTORY: 83 years of age, Male.  UTI, bladder cancer, lower abd/pelvic  pain; N39.0-Urinary tract infection, site not specified;  G93.40-Encephalopathy, unspecified; E11.69-Type 2 diabetes mellitus with  other specified complication     TECHNIQUE:  CT includes axial imaging from the lung bases to the  trochanters without intravenous contrast and without use of oral  contrast. Data reconstructed in coronal and sagittal planes. Radiation  dose reduction techniques were utilized, including automated exposure  control and exposure modulation based on body size.     COMPARISON: MRI abdomen 03/05/2023.     FINDINGS: There is dependent lower lobe atelectasis. Small pericardial  effusion is present.     Liver, spleen, adrenal glands, pancreas appear within normal limits.  There has been previous cholecystectomy and there are several surgical  clips in the right upper quadrant.     There is a right ureteral stent. The proximal aspect of the stent  extends through the renal parenchyma and posterior to the right upper  pole cortex into the right perirenal fat. There is moderate right  hydronephrosis and there is right perinephric and periureteral  stranding.     There is a right posterior and lateral bladder mass associated with  masslike thickening indistinguishable from the prostate gland.     Left kidney appears within normal limits for there is a left extrarenal  pelvis which is mildly distended. There is no bowel dilatation or  evidence of bowel obstruction. Small bowel anastomotic sutures are  present left upper quadrant and there is been gastrojejunostomy.  Scoliotic curvature is present of the lumbar spine with multilevel  degenerative disc disease.       Impression:      1. The proximal pigtail loop of the right ureteral stent extends  posterior to the right renal upper pole cortex partially into the right  pararenal fat. There is moderate  right hydronephrosis and right  perinephric and periureteral stranding.  2. Right lateral and posterior urinary bladder mass is indistinguishable  from the prostate gland on this noncontrasted study.  3. Small pericardial effusion. Bibasilar atelectasis.     Radiation dose reduction techniques were utilized, including automated  exposure control and exposure modulation based on body size.        This report was finalized on 5/16/2024 1:02 PM by Dr. Zachary Park M.D on Workstation: [id number]       CT Head Without Contrast [[id number]] Collected: 05/15/24 1057     Updated: 05/15/24 1529    Narrative:      CT HEAD WITHOUT CONTRAST     HISTORY: Weakness, unsteady gait, confusion.     COMPARISON: CT head 10/31/2021.     FINDINGS: The brain and ventricles are symmetrical. There is no evidence  of hemorrhage, hydrocephalus or of abnormal extra-axial fluid. No focal  area of decreased attenuation to suggest acute infarction is identified.  Further evaluation could be performed with a MRI examination of the  brain as indicated.     Radiation dose reduction techniques were utilized, including automated  exposure control and exposure modulation based on body size.        This report was finalized on 5/15/2024 3:26 PM by Dr. Mata Riddle M.D  on Workstation: [id number]       XR Chest 1 View [[id number]] Collected: 05/15/24 1057     Updated: 05/15/24 1101    Narrative:      XR CHEST 1 VW-     Clinical: Lethargy     COMPARISON examination 10/31/2021     FINDINGS: Cardiac size within normal limits, no mediastinal or hilar  abnormality. The lungs are clear. No effusion or edema seen.     CONCLUSION: No active disease of the chest     This report was finalized on 5/15/2024 10:58 AM by Dr. Kevin Ponce M.D on Workstation: [id number]               Results for orders placed during the hospital encounter of 10/31/21    Adult Transthoracic Echo Complete W/ Cont if Necessary Per Protocol    Interpretation Summary  · Left ventricular  "ejection fraction appears to be 66 - 70%. Left ventricular systolic function is normal.  · Left ventricular diastolic function was normal.  · No significant valvular stenosis or regurgitation noted.    Pertinent Labs     Results from last 7 days   Lab Units 05/20/24 0611 05/19/24 0431 05/18/24 0639 05/17/24 0351   WBC 10*3/mm3 10.44 12.71* 16.36* 9.11   HEMOGLOBIN g/dL 9.0* 10.6* 10.8* 9.5*   PLATELETS 10*3/mm3 388 439 377 312     Results from last 7 days   Lab Units 05/20/24 0611 05/19/24 0431 05/18/24 0639 05/17/24 0351   SODIUM mmol/L 139 141 141 141   POTASSIUM mmol/L 4.3 4.5 4.4 4.0   CHLORIDE mmol/L 107 108* 109* 110*   CO2 mmol/L 23.1 21.0* 20.8* 19.0*   BUN mg/dL 22 28* 28* 32*   CREATININE mg/dL 1.48* 1.70* 1.94* 1.93*   GLUCOSE mg/dL 92 68 65 60*   EGFR mL/min/1.73 46.9* 39.8* 33.9* 34.1*     Results from last 7 days   Lab Units 05/20/24  0611 05/18/24  0639 05/17/24  0351 05/16/24  0441 05/15/24  1026   ALBUMIN g/dL 2.6* 2.8* 2.5* 2.6* 3.3*   BILIRUBIN mg/dL  --   --  0.2 0.3 0.3   ALK PHOS U/L  --   --  109 121* 150*   AST (SGOT) U/L  --   --  19 21 13   ALT (SGPT) U/L  --   --  23 21 20     Results from last 7 days   Lab Units 05/20/24 0611 05/19/24 0431 05/18/24 0639 05/17/24 0351 05/16/24 0441   CALCIUM mg/dL 8.4* 8.6 8.7 8.2* 8.6   ALBUMIN g/dL 2.6*  --  2.8* 2.5* 2.6*   MAGNESIUM mg/dL 1.9  --  1.9 2.0  --    PHOSPHORUS mg/dL 3.4  --  4.2 3.8  --                Invalid input(s): \"LDLCALC\"  Results from last 7 days   Lab Units 05/15/24  1220 05/15/24  1155 05/15/24  1018   BLOODCX  No growth at 5 days No growth at 5 days  --    URINECX   --   --  25,000 CFU/mL Mixed Shelia Isolated         Test Results Pending at Discharge       Discharge Details        Discharge Medications        New Medications        Instructions Start Date   cephalexin 500 MG capsule  Commonly known as: Keflex   500 mg, Oral, 3 Times Daily             Changes to Medications        Instructions Start Date   Tresiba " FlexTouch 200 UNIT/ML solution pen-injector pen injection  Generic drug: Insulin Degludec  What changed: how much to take   38 Units, Subcutaneous, Daily             Continue These Medications        Instructions Start Date   BD Pen Needle Nola 2nd Gen 32G X 4 MM misc  Generic drug: Insulin Pen Needle   USE WITH INSULIN EVERY DAY      Blood Glucose Monitor System w/Device kit   Use to test 2 (Two) Times a Day.      glucose blood test strip   Use one test strip 2 (Two) Times a Day. Use as instructed      levothyroxine 50 MCG tablet  Commonly known as: SYNTHROID, LEVOTHROID   50 mcg, Oral, Daily      Microlet Lancets misc   Use 1 Daily to check blood sugar      pantoprazole 40 MG EC tablet  Commonly known as: PROTONIX   40 mg, Oral, Daily      Sensorin capsule capsule   1 tablet, Oral, Daily      tamsulosin 0.4 MG capsule 24 hr capsule  Commonly known as: Flomax   0.4 mg, Oral, Daily               No Known Allergies    Discharge Disposition:  Home or Self Care      Discharge Diet:  Diet Order   Procedures    Diet: Diabetic; Consistent Carbohydrate; Fluid Consistency: Thin (IDDSI 0)       Discharge Activity:   Activity Instructions       Activity as Tolerated              CODE STATUS:    Code Status and Medical Interventions:   Ordered at: 05/15/24 1415     Code Status (Patient has no pulse and is not breathing):    CPR (Attempt to Resuscitate)     Medical Interventions (Patient has pulse or is breathing):    Full Support       Future Appointments   Date Time Provider Department Center   6/14/2024  9:00 AM Mary Ann Carvajal APRN [facility]   9/13/2024 10:45 AM Travon Shah MD [facility]     Additional Instructions for the Follow-ups that You Need to Schedule       Discharge Follow-up with PCP   As directed       Currently Documented PCP:    Travon Shah MD    PCP Phone Number:    [phone]     Follow Up Details: 1-2 weeks        Discharge Follow-up with Specified Provider: urology  as directed   As directed      To: urology as directed               Follow-up Information       Travon Shah MD .    Specialties: Family Medicine, Emergency Medicine  Why: 1-2 weeks  Contact information:  [address] [place] [address]  [phone]                             Additional Instructions for the Follow-ups that You Need to Schedule       Discharge Follow-up with PCP   As directed       Currently Documented PCP:    Travon Shah MD    PCP Phone Number:    [phone]     Follow Up Details: 1-2 weeks        Discharge Follow-up with Specified Provider: urology as directed   As directed      To: urology as directed            Time Spent on Discharge:  Greater than 30 minutes      Mata Strong MD  [facility]  05/20/24  13:42 EDT

## 2024-05-20 NOTE — PLAN OF CARE
Goal Outcome Evaluation:  Plan of Care Reviewed With: patient, spouse        Progress: improving       Plan to discharge home with wife this shift.

## 2024-05-20 NOTE — OUTREACH NOTE
Prep Survey      Flowsheet Row Responses   [facility] patient discharged from? [place]   Is LACE score < 7 ? No   Eligibility [facility]   Date of Admission 05/15/24   Date of Discharge 05/20/24   Discharge Disposition Home or Self Care   Discharge diagnosis Acute UTI, CYSTOSCOPY, RIGHT URETERAL STENT EXCHANGE   Does the patient have one of the following disease processes/diagnoses(primary or secondary)? Other   Does the patient have Home health ordered? No   Is there a DME ordered? No   Prep survey completed? Yes            Jayne Birmingham Registered Nurse

## 2024-05-20 NOTE — CASE MANAGEMENT/SOCIAL WORK
Case Management Discharge Note      Final Note: Home with wife.    Provided Post Acute Provider List?: N/A  Provided Post Acute Provider Quality & Resource List?: N/A    Selected Continued Care - Discharged on 5/20/2024 Admission date: 5/15/2024 - Discharge disposition: Home or Self Care      Destination    No services have been selected for the patient.                Durable Medical Equipment    No services have been selected for the patient.                Dialysis/Infusion    No services have been selected for the patient.                Home Medical Care    No services have been selected for the patient.                Therapy    No services have been selected for the patient.                Community Resources    No services have been selected for the patient.                Community & DME    No services have been selected for the patient.                    Selected Continued Care - Episodes Includes continued care and service providers with selected services from the active episodes listed below      [facility] Episode start date: 1/6/2022   There are no active outsourced providers for this episode.                      Final Discharge Disposition Code: 01 - home or self-care

## 2024-05-20 NOTE — TELEPHONE ENCOUNTER
HUB UNABLE TO WARM TRANSFER    Caller: [facility]    Relationship to patient:     Best call back number: [phone] (PATIENT'S #)    New or established patient?  [] New  [x] Established    Date of discharge: 5/20/2024    Facility discharged from: [facility]    Diagnosis/Symptoms: ACUTE UTI    Length of stay (If applicable): CAME IN 5/15/2024

## 2024-05-20 NOTE — CASE MANAGEMENT/SOCIAL WORK
Continued Stay Note  [facility]     Patient Name: Bennie Greene  MRN: [id number]  Today's Date: 5/20/2024    Admit Date: 5/15/2024    Plan: Home with family   Discharge Plan       Row Name 05/20/24 0945       Plan    Plan Home with family    Patient/Family in Agreement with Plan yes    Plan Comments CCP met with the patient and his wife at bedside with the patient's verbal permission. CCP confirmed the patient's discharge plan remains the same; home with family. Wife to transport at discharge. CCP to follow. CD, CSW.                   Discharge Codes    No documentation.                 Expected Discharge Date and Time       Expected Discharge Date Expected Discharge Time    May 19, 2024

## 2024-05-21 ENCOUNTER — TRANSITIONAL CARE MANAGEMENT TELEPHONE ENCOUNTER (OUTPATIENT)
Dept: CALL CENTER | Facility: HOSPITAL | Age: 84
End: 2024-05-21
Payer: COMMERCIAL

## 2024-05-21 ENCOUNTER — TELEPHONE (OUTPATIENT)
Dept: INTERNAL MEDICINE | Facility: CLINIC | Age: 84
End: 2024-05-21
Payer: COMMERCIAL

## 2024-05-21 NOTE — OUTREACH NOTE
Call Center TCM Note      Flowsheet Row Responses   [facility] patient discharged from? [place]   Does the patient have one of the following disease processes/diagnoses(primary or secondary)? Other   TCM attempt successful? Yes   Call start time 1508   Call end time 1513   Discharge diagnosis Acute UTI, pyelonephritis, bladder cancer, CYSTOSCOPY RIGHT URETERAL STENT EXCHANGE, GERD, acquired hypothyroidism, [id number]   Is patient permission given to speak with other caregiver? Yes   Person spoke with today (if not patient) and relationship Diane Greene, spouse   Meds reviewed with patient/caregiver? Yes  [New: cephalexin]   Is the patient having any side effects they believe may be caused by any medication additions or changes? Yes   Side effects comments  Nausea   Does the patient have all medications ordered at discharge? Yes   Is the patient taking all medications as directed (includes completed medication regime)? Yes   Medication comments Patient requesting PCP to order nausea medication. Message routed to office.   Comments PCP DR Shah. Hospital follow up in place for 6/3  10am with PCP   Does the patient have an appointment with their PCP within 7-14 days of discharge? Yes   Has home health visited the patient within 72 hours of discharge? N/A   Psychosocial issues? No   Comments Reports that they checked blood sugar this am and in good range   Did the patient receive a copy of their discharge instructions? Yes   Nursing interventions Reviewed instructions with patient   What is the patient's perception of their health status since discharge? Improving   Is the patient/caregiver able to teach back signs and symptoms related to disease process for when to call PCP? Yes   Is the patient/caregiver able to teach back signs and symptoms related to disease process for when to call 911? Yes   Is the patient/caregiver able to teach back the hierarchy of who to call/visit for symptoms/problems? PCP, Specialist,  Home health nurse, Urgent Care, ED, 911 Yes   If the patient is a current smoker, are they able to teach back resources for cessation? Not a smoker   TCM call completed? Yes   Wrap up additional comments Endocrinology appt 6/14  9am   Call end time 1513   Would this patient benefit from a Referral to [facility] Social Work? No   Is the patient interested in additional calls from an ambulatory ? No            Kelly Diaz RN    5/21/2024, 15:16 EDT

## 2024-05-22 DIAGNOSIS — R11.0 NAUSEA: Primary | ICD-10-CM

## 2024-05-22 RX ORDER — ONDANSETRON HYDROCHLORIDE 8 MG/1
TABLET, FILM COATED ORAL
Qty: 12 TABLET | Refills: 0 | Status: SHIPPED | OUTPATIENT
Start: 2024-05-22

## 2024-06-03 ENCOUNTER — OFFICE VISIT (OUTPATIENT)
Dept: INTERNAL MEDICINE | Facility: CLINIC | Age: 84
End: 2024-06-03
Payer: MEDICARE

## 2024-06-03 VITALS
HEIGHT: 70 IN | OXYGEN SATURATION: 94 % | RESPIRATION RATE: 18 BRPM | DIASTOLIC BLOOD PRESSURE: 64 MMHG | BODY MASS INDEX: 22.9 KG/M2 | HEART RATE: 74 BPM | SYSTOLIC BLOOD PRESSURE: 122 MMHG | WEIGHT: 160 LBS

## 2024-06-03 DIAGNOSIS — N39.0 ACUTE UTI: ICD-10-CM

## 2024-06-03 DIAGNOSIS — C67.9 MALIGNANT NEOPLASM OF URINARY BLADDER, UNSPECIFIED SITE: Chronic | ICD-10-CM

## 2024-06-03 DIAGNOSIS — N10 ACUTE PYELONEPHRITIS: ICD-10-CM

## 2024-06-03 DIAGNOSIS — Z96.0 URETERAL STENT PRESENT: ICD-10-CM

## 2024-06-03 DIAGNOSIS — E11.65 TYPE 2 DIABETES MELLITUS WITH HYPERGLYCEMIA, WITH LONG-TERM CURRENT USE OF INSULIN: Chronic | ICD-10-CM

## 2024-06-03 DIAGNOSIS — Z79.4 TYPE 2 DIABETES MELLITUS WITH HYPERGLYCEMIA, WITH LONG-TERM CURRENT USE OF INSULIN: Chronic | ICD-10-CM

## 2024-06-03 DIAGNOSIS — R11.0 NAUSEA: ICD-10-CM

## 2024-06-03 DIAGNOSIS — E03.9 ACQUIRED HYPOTHYROIDISM: Chronic | ICD-10-CM

## 2024-06-03 DIAGNOSIS — Z09 HOSPITAL DISCHARGE FOLLOW-UP: Primary | ICD-10-CM

## 2024-06-03 PROBLEM — Z85.46 PERSONAL HISTORY OF MALIGNANT NEOPLASM OF PROSTATE: Chronic | Status: ACTIVE | Noted: 2017-10-18

## 2024-06-03 PROCEDURE — 1160F RVW MEDS BY RX/DR IN RCRD: CPT | Performed by: FAMILY MEDICINE

## 2024-06-03 PROCEDURE — 1126F AMNT PAIN NOTED NONE PRSNT: CPT | Performed by: FAMILY MEDICINE

## 2024-06-03 PROCEDURE — 99495 TRANSJ CARE MGMT MOD F2F 14D: CPT | Performed by: FAMILY MEDICINE

## 2024-06-03 PROCEDURE — 1159F MED LIST DOCD IN RCRD: CPT | Performed by: FAMILY MEDICINE

## 2024-06-03 PROCEDURE — 1111F DSCHRG MED/CURRENT MED MERGE: CPT | Performed by: FAMILY MEDICINE

## 2024-06-03 RX ORDER — ONDANSETRON HYDROCHLORIDE 8 MG/1
8 TABLET, FILM COATED ORAL EVERY 8 HOURS PRN
Qty: 30 TABLET | Refills: 3 | Status: SHIPPED | OUTPATIENT
Start: 2024-06-03

## 2024-06-03 NOTE — PROGRESS NOTES
Transitional Care Follow Up Visit  Subjective     Bennie Greene is a 83 y.o. male who presents for a transitional care management visit.    Within 48 business hours after discharge our office contacted him via telephone to coordinate his care and needs.      I reviewed and discussed the details of that call along with the discharge summary, hospital problems, inpatient lab results, inpatient diagnostic studies, and consultation reports with Bennie.     Current outpatient and discharge medications have been reconciled for the patient.  Reviewed by: Travon Shah MD          5/20/2024     4:54 PM   Date of TCM Phone Call   [facility]   Date of Admission 5/15/2024   Date of Discharge 5/20/2024   Discharge Disposition Home or Self Care     Risk for Readmission (LACE) Score: 12 (5/20/2024  6:00 AM)      History of Present Illness   Course During Hospital Stay:  He has a history of bladder cancer, hypothyroidism, diabetes who presented with and was treated for acute UTI.  On further work-up it was determined he had pyelonephritis and UTI.  He was successfully treated with IV antibiotic therapy.  He had bladder cancer ureteral obstruction and stent was exchanged.  According to the report the sugars were labile during the stay.    Port to be placed soon for chemotherapy.  Then plan is to remove bladder and prostate.  He is still experiencing some nausea.      Current Outpatient Medications:     BD Pen Needle Nola 2nd Gen 32G X 4 MM misc, USE WITH INSULIN EVERY DAY, Disp: 100 each, Rfl: 3    Blood Glucose Monitoring Suppl (Blood Glucose Monitor System) w/Device kit, Use to test 2 (Two) Times a Day., Disp: 1 each, Rfl: 0    glucose blood test strip, Use one test strip 2 (Two) Times a Day. Use as instructed, Disp: 200 each, Rfl: 11    Lancets misc, Use 1 Daily to check blood sugar, Disp: 100 each, Rfl: 1    levothyroxine (SYNTHROID, LEVOTHROID) 50 MCG tablet, TAKE 1 TABLET BY MOUTH EVERY DAY, Disp:  "90 tablet, Rfl: 0    ondansetron (Zofran) 8 MG tablet, Take 1 tablet by mouth Every 8 (Eight) Hours As Needed for Nausea or Vomiting., Disp: 30 tablet, Rfl: 3    pantoprazole (PROTONIX) 40 MG EC tablet, Take 1 tablet by mouth Daily., Disp: 90 tablet, Rfl: 3    Probiotic Product (VoteIt) capsule, Take 1 tablet by mouth Daily., Disp: 30 capsule, Rfl: 5    tamsulosin (Flomax) 0.4 MG capsule 24 hr capsule, Take 1 capsule by mouth Daily., Disp: 30 capsule, Rfl: 11    Tresiba FlexTouch 200 UNIT/ML solution pen-injector pen injection, Inject 38 Units under the skin into the appropriate area as directed Daily., Disp: 18 mL, Rfl: 0       The following portions of the patient's history were reviewed and updated as appropriate: allergies, current medications, past family history, past medical history, past social history, past surgical history, and problem list.    Vitals:    06/03/24 0956   BP: 122/64   Pulse: 74   Resp: 18   SpO2: 94%     Body mass index is 22.96 kg/m².      Objective   /64 (BP Location: Left arm, Patient Position: Sitting, Cuff Size: Small Adult)   Pulse 74   Resp 18   Ht 177.8 cm (70\")   Wt 72.6 kg (160 lb)   SpO2 94%   BMI 22.96 kg/m²   Physical Exam  Vitals and nursing note reviewed.   Constitutional:       General: He is not in acute distress.     Appearance: Normal appearance.   Cardiovascular:      Rate and Rhythm: Normal rate and regular rhythm.      Heart sounds: Normal heart sounds. No murmur heard.  Pulmonary:      Effort: Pulmonary effort is normal.      Breath sounds: Normal breath sounds.   Neurological:      Mental Status: He is alert.       Common labs          5/18/2024    06:39 5/19/2024    04:31 5/20/2024    06:11   Common Labs   Glucose 65  68  92    BUN 28  28  22    Creatinine 1.94  1.70  1.48    Sodium 141  141  139    Potassium 4.4  4.5  4.3    Chloride 109  108  107    Calcium 8.7  8.6  8.4    Albumin 2.8   2.6    WBC 16.36  12.71  10.44    Hemoglobin 10.8  " 10.6  9.0    Hematocrit 34.2  33.6  28.5    Platelets 377  439  388      I also reviewed CT head and XR chest 5/15/2024; CT abdomen pelvis 5/16/2024    Assessment & Plan   Diagnoses and all orders for this visit:    1. Hospital discharge follow-up (Primary)    2. Acute pyelonephritis  -     ondansetron (Zofran) 8 MG tablet; Take 1 tablet by mouth Every 8 (Eight) Hours As Needed for Nausea or Vomiting.  Dispense: 30 tablet; Refill: 3  -     Urinalysis With Microscopic - Urine, Clean Catch  -     Urine Culture - Urine, Urine, Clean Catch    3. Acute UTI  -     ondansetron (Zofran) 8 MG tablet; Take 1 tablet by mouth Every 8 (Eight) Hours As Needed for Nausea or Vomiting.  Dispense: 30 tablet; Refill: 3  -     Urinalysis With Microscopic - Urine, Clean Catch  -     Urine Culture - Urine, Urine, Clean Catch    4. Type 2 diabetes mellitus with hyperglycemia, with long-term current use of insulin    5. Acquired hypothyroidism    6. Malignant neoplasm of urinary bladder, unspecified site  -     ondansetron (Zofran) 8 MG tablet; Take 1 tablet by mouth Every 8 (Eight) Hours As Needed for Nausea or Vomiting.  Dispense: 30 tablet; Refill: 3    7. Ureteral stent present    8. Nausea  -     ondansetron (Zofran) 8 MG tablet; Take 1 tablet by mouth Every 8 (Eight) Hours As Needed for Nausea or Vomiting.  Dispense: 30 tablet; Refill: 3        Labs, studies and notes reviewed as above.  Continue all medications as above.  Encouraged him to use the full tablet of Zofran and I will check his urine to ensure the infection is better.  He will follow-up with endocrine here soon regarding his blood sugars.

## 2024-06-03 NOTE — PATIENT INSTRUCTIONS
"MyChart Tips:    [employer] can be a useful part of our patient care program and is a way for us to communicate lab results and for you to request refills.  Here is some information regarding the best way to use [employer] for communication.       Examples of medical issues that are APPROPRIATE for [employer]:  -Follow-up on problems we have already addressed in a visit such as home testing results, blood pressure readings, glucose readings  -Questions that can be answered with a simple \"yes\" or \"no\"  -Please keep communication concise and to the point.  All other types of communication should be held for an office visit.    Communication that is NOT APPROPRIATE for [employer]:  -New problems, serious problems or urgent problems.  Urgent matters should be addressed by phone for advice, in the office, urgent care or the ER.  -Requesting Paxlovid or Antibiotics: These types of problems require an evaluation unless your provider approved this previously.    -[employer] messages are not email.  Staff will check messages each weekday.  We strive for a 48-hour turnaround on messaging.    -[employer] is not for private issues.  Messages are received first by our office staff.    Please be mindful that sometimes it may take longer to receive a response on [employer].  Many times of the year we have high volumes of messages coming into physician inboxes.      Please also be mindful that [employer] messages become part of your permanent medical record.    We appreciate your respect for the limitations and boundaries of [employer].      Lab Results:  Please allow up to 7 business days for lab results to be sent through [employer] to you before contacting your provider.  Sometimes we are waiting for results to get back from the lab and also your provider needs time to analyze them thoroughly before making recommendations.  Also, providers may be out of the office on vacation.      While the internet has great resources, it is not a substitute for " interpreting lab results.

## 2024-06-05 LAB
APPEARANCE UR: ABNORMAL
BACTERIA #/AREA URNS HPF: ABNORMAL /HPF
BACTERIA UR CULT: NORMAL
BACTERIA UR CULT: NORMAL
BILIRUB UR QL STRIP: NEGATIVE
CASTS URNS MICRO: ABNORMAL
COLOR UR: YELLOW
EPI CELLS #/AREA URNS HPF: ABNORMAL /HPF
GLUCOSE UR QL STRIP: NEGATIVE
HGB UR QL STRIP: ABNORMAL
KETONES UR QL STRIP: NEGATIVE
LEUKOCYTE ESTERASE UR QL STRIP: ABNORMAL
NITRITE UR QL STRIP: NEGATIVE
PH UR STRIP: 6 [PH] (ref 5–8)
PROT UR QL STRIP: ABNORMAL
RBC #/AREA URNS HPF: ABNORMAL /HPF
SP GR UR STRIP: 1.02 (ref 1–1.03)
UROBILINOGEN UR STRIP-MCNC: ABNORMAL MG/DL
WBC #/AREA URNS HPF: ABNORMAL /HPF

## 2024-06-06 DIAGNOSIS — E11.9 TYPE 2 DIABETES MELLITUS WITHOUT COMPLICATION, WITH LONG-TERM CURRENT USE OF INSULIN: ICD-10-CM

## 2024-06-06 DIAGNOSIS — Z79.4 TYPE 2 DIABETES MELLITUS WITHOUT COMPLICATION, WITH LONG-TERM CURRENT USE OF INSULIN: ICD-10-CM

## 2024-06-06 RX ORDER — LANCETS 30 GAUGE
1 EACH MISCELLANEOUS DAILY
Qty: 100 EACH | Refills: 1 | Status: SHIPPED | OUTPATIENT
Start: 2024-06-06 | End: 2024-09-15

## 2024-06-06 NOTE — TELEPHONE ENCOUNTER
Rx Refill Note  Requested Prescriptions     Pending Prescriptions Disp Refills    Lancets misc 100 each 1     Sig: Use 1 Daily to check blood sugar      Last office visit with prescribing clinician: 3/14/2024   Last telemedicine visit with prescribing clinician: Visit date not found   Next office visit with prescribing clinician: 6/14/2024                         Would you like a call back once the refill request has been completed: [] Yes [] No    If the office needs to give you a call back, can they leave a voicemail: [] Yes [] No    Katherine Powell  06/06/24, 15:53 EDT

## 2024-06-13 NOTE — PROGRESS NOTES
"Chief Complaint  Diabetes    Subjective        Bennie Greene presents to [facility]  History of Present Illness    Diagnosed with bladder cancer, had to miss chemo yesterday r/t elevated WBCs  He has been treated for a UTI and antibiotic  He is struggling with fatigue and nausea   Losing weight     Type 2 dm, > 10 years   DM regimen: Tresiba 40 units in the PM- oncology told him it would work better taking it at night- started last night    Checking BS BID   BS around dinner time ~ 200   Fasting BS < 100    Objective   Vital Signs:  /64 (BP Location: Left arm, Patient Position: Sitting)   Pulse 76   Temp 97.7 °F (36.5 °C) (Oral)   Ht 177.8 cm (70\")   Wt 72.2 kg (159 lb 3.2 oz)   SpO2 100%   BMI 22.84 kg/m²   Estimated body mass index is 22.84 kg/m² as calculated from the following:    Height as of this encounter: 177.8 cm (70\").    Weight as of this encounter: 72.2 kg (159 lb 3.2 oz).       BMI is within normal parameters. No other follow-up for BMI required.      Physical Exam  Vitals reviewed.   Constitutional:       General: He is not in acute distress.     Appearance: He is ill-appearing.      Comments: Nauseated, drowsy    HENT:      Head: Normocephalic and atraumatic.   Cardiovascular:      Rate and Rhythm: Normal rate and regular rhythm.   Pulmonary:      Effort: Pulmonary effort is normal. No respiratory distress.   Musculoskeletal:         General: No signs of injury. Normal range of motion.      Cervical back: Normal range of motion and neck supple.   Skin:     General: Skin is warm and dry.   Neurological:      Mental Status: He is alert and oriented to person, place, and time. Mental status is at baseline.      Motor: Weakness present.   Psychiatric:         Mood and Affect: Mood normal.         Behavior: Behavior normal.         Thought Content: Thought content normal.         Judgment: Judgment normal.        Result Review :    The following data was " reviewed by: ALTAGRACIA Rogers on 06/14/2024:  Common labs          5/20/2024    06:11 6/5/2024    09:37 6/13/2024    09:44   Common Labs   Glucose 92      BUN 22      Creatinine 1.48      Sodium 139      Potassium 4.3      Chloride 107      Calcium 8.4      Albumin 2.6      WBC 10.44  11.00     16.92       Hemoglobin 9.0  10.2     9.4       Hematocrit 28.5  33.1     30.9       Platelets 388  321     294          Details          This result is from an external source.                          Assessment and Plan     Diagnoses and all orders for this visit:    1. Type 2 diabetes mellitus with hyperglycemia, with long-term current use of insulin (Primary)  -     Insulin Lispro, 1 Unit Dial, (HumaLOG KwikPen) 100 UNIT/ML solution pen-injector; Inject 5 Units under the skin into the appropriate area as directed Daily With Dinner. Will adjust dose with provider. Max dose 40u daily. Take 5u before dinner if BS > 150. Start eating within 5 minutes of injection  Dispense: 15 mL; Refill: 0  -     Tresiba FlexTouch 200 UNIT/ML solution pen-injector pen injection; Inject 35 Units under the skin into the appropriate area as directed Daily.  Dispense: 18 mL; Refill: 0    Other orders  -     Discontinue: Insulin Lispro, 1 Unit Dial, (HumaLOG KwikPen) 100 UNIT/ML solution pen-injector; Inject 5 Units under the skin into the appropriate area as directed Daily With Dinner. Will adjust dose with provider. Max dose 40u daily  Dispense: 15 mL; Refill: 0             Follow Up     Return in about 3 months (around 9/14/2024).    A1c worrisome for complications given upcoming oncology treatments and surgical interventions  Add humalog 5u at dinner if BS > 150 before eating   Decrease tresiba to 35u nightly to prevent fasting hypoglycemia   Caution with hypoglycemia given nausea and decreased appetite  Wife to keep me updated with BS results to adjust insulin dosing remotely for patient safety  High risk of complications     Patient was  given instructions and counseling regarding his condition or for health maintenance advice. Please see specific information pulled into the AVS if appropriate.     ALTAGRACIA Rogers

## 2024-06-14 ENCOUNTER — OFFICE VISIT (OUTPATIENT)
Dept: ENDOCRINOLOGY | Age: 84
End: 2024-06-14
Payer: MEDICARE

## 2024-06-14 VITALS
DIASTOLIC BLOOD PRESSURE: 64 MMHG | TEMPERATURE: 97.7 F | WEIGHT: 159.2 LBS | OXYGEN SATURATION: 100 % | HEIGHT: 70 IN | HEART RATE: 76 BPM | SYSTOLIC BLOOD PRESSURE: 120 MMHG | BODY MASS INDEX: 22.79 KG/M2

## 2024-06-14 DIAGNOSIS — E11.65 TYPE 2 DIABETES MELLITUS WITH HYPERGLYCEMIA, WITH LONG-TERM CURRENT USE OF INSULIN: Primary | ICD-10-CM

## 2024-06-14 DIAGNOSIS — Z79.4 TYPE 2 DIABETES MELLITUS WITH HYPERGLYCEMIA, WITH LONG-TERM CURRENT USE OF INSULIN: Primary | ICD-10-CM

## 2024-06-14 PROCEDURE — 99214 OFFICE O/P EST MOD 30 MIN: CPT | Performed by: NURSE PRACTITIONER

## 2024-06-14 RX ORDER — INSULIN LISPRO 100 [IU]/ML
40 INJECTION, SOLUTION INTRAVENOUS; SUBCUTANEOUS
Qty: 15 ML | Refills: 0 | Status: SHIPPED | OUTPATIENT
Start: 2024-06-14

## 2024-06-14 RX ORDER — INSULIN LISPRO 100 [IU]/ML
5 INJECTION, SOLUTION INTRAVENOUS; SUBCUTANEOUS
Qty: 15 ML | Refills: 0 | Status: SHIPPED | OUTPATIENT
Start: 2024-06-14 | End: 2024-06-14

## 2024-06-14 RX ORDER — DIPHENOXYLATE HYDROCHLORIDE AND ATROPINE SULFATE 2.5; .025 MG/1; MG/1
1 TABLET ORAL DAILY
COMMUNITY

## 2024-06-14 RX ORDER — INSULIN DEGLUDEC 200 U/ML
35 INJECTION, SOLUTION SUBCUTANEOUS DAILY
Qty: 18 ML | Refills: 0 | Status: SHIPPED | OUTPATIENT
Start: 2024-06-14

## 2024-06-18 DIAGNOSIS — E03.9 ACQUIRED HYPOTHYROIDISM: ICD-10-CM

## 2024-06-18 RX ORDER — LEVOTHYROXINE SODIUM 0.05 MG/1
50 TABLET ORAL DAILY
Qty: 90 TABLET | Refills: 0 | Status: SHIPPED | OUTPATIENT
Start: 2024-06-18

## 2024-06-18 NOTE — TELEPHONE ENCOUNTER
Caller: Diane Greene    Relationship: Emergency Contact    Best call back number: [phone]    Requested Prescriptions:     levothyroxine (SYNTHROID, LEVOTHROID) 50 MCG tablet     Requested Prescriptions      No prescriptions requested or ordered in this encounter        Pharmacy where request should be sent: [facility]     Last office visit with prescribing clinician: 6/14/2024   Last telemedicine visit with prescribing clinician: Visit date not found   Next office visit with prescribing clinician: 9/18/2024     Additional details provided by patient: PT NEEDS A REFILL ON HIS MEDICATION.     Does the patient have less than a 3 day supply:  [] Yes  [x] No    Would you like a call back once the refill request has been completed: [x] Yes [] No    If the office needs to give you a call back, can they leave a voicemail: [x] Yes [] No    Sarbjit Tirado Rep   06/18/24 12:16 EDT

## 2024-06-18 NOTE — TELEPHONE ENCOUNTER
Rx Refill Note  Requested Prescriptions     Pending Prescriptions Disp Refills    levothyroxine (SYNTHROID, LEVOTHROID) 50 MCG tablet 90 tablet 0     Sig: Take 1 tablet by mouth Daily.      Last office visit with prescribing clinician: 6/14/2024   Last telemedicine visit with prescribing clinician: Visit date not found   Next office visit with prescribing clinician: 9/18/2024                         Would you like a call back once the refill request has been completed: [] Yes [] No    If the office needs to give you a call back, can they leave a voicemail: [] Yes [] No    Katherine Powell  06/18/24, 12:20 EDT

## 2024-06-20 ENCOUNTER — TELEPHONE (OUTPATIENT)
Dept: ENDOCRINOLOGY | Age: 84
End: 2024-06-20
Payer: COMMERCIAL

## 2024-06-20 NOTE — TELEPHONE ENCOUNTER
Increase tresiba to 45u ( he should be on 35u currently) and notify me if any BS < 90 in the morning, fasting    He should also be taking humalog 5u at dinner if BS > 150 before eating currently-   He should start taking the humalog with every meal 5u if BS > 150 before eating     Is he ok with this plan?  He can update me in a few days with how his readings are doing

## 2024-06-20 NOTE — TELEPHONE ENCOUNTER
Caller: Diane Greene    Relationship to patient: Emergency Contact    Best call back number: [phone]    Patient is needing: PT HAVING TROUBLE WITH HIS BLOOD SUGAR. THIS MORNING HIS BLOOD SUGAR . TOOK HIS TRESIBA AND ATE BREAKFAST. WENT TO GET HIS CHEMO AND COULDNT GET IT BECAUSE HIS SUGAR . PLEASE CALL PT TO ADVISE.

## 2024-06-20 NOTE — TELEPHONE ENCOUNTER
Called and spoke with pt wife to let her know about medication changes. She voiced understanding and had no further questions.     Reminder set to call Tuesday to get bs readings.

## 2024-06-26 ENCOUNTER — SPECIALTY PHARMACY (OUTPATIENT)
Dept: ENDOCRINOLOGY | Age: 84
End: 2024-06-26
Payer: COMMERCIAL

## 2024-06-26 NOTE — PROGRESS NOTES
Specialty Pharmacy Refill Coordination Note     Bennie is a 83 y.o. male contacted today regarding refills of  1 specialty medication(s).    Reviewed and verified with patient:       Specialty medication(s) and dose(s) confirmed: n/a        Delivery Questions      Flowsheet Row Most Recent Value   Delivery method  at Pharmacy  [ AT PHARMACY 6/26/24 ASAP]   Number of medications in delivery 1   Medication(s) being filled and delivered Prochlorperazine Maleate   Doses left of specialty medications N/A   Copay verified? Yes   Copay amount $1.81   Copay form of payment Credit/debit on file                      May Monson, Pharmacy Technician  Specialty Pharmacy Technician

## 2024-07-09 RX ORDER — MIRABEGRON 25 MG/1
25 TABLET, FILM COATED, EXTENDED RELEASE ORAL DAILY
Qty: 30 TABLET | Refills: 2 | Status: SHIPPED | OUTPATIENT
Start: 2024-07-09

## 2024-07-13 ENCOUNTER — READMISSION MANAGEMENT (OUTPATIENT)
Dept: CALL CENTER | Facility: HOSPITAL | Age: 84
End: 2024-07-13
Payer: COMMERCIAL

## 2024-07-13 NOTE — OUTREACH NOTE
Prep Survey      Flowsheet Row Responses   [facility]ond[facility]ominational facility patient discharged from? Non-BH   Is LACE score < 7 ? Non-BH Discharge   Eligibility [facility]   Date of Admission 07/09/24   Date of Discharge 07/13/24   Discharge Disposition Home or Self Care   Discharge diagnosis MENDOZA/Malignant neoplasm of urinary bladder   Does the patient have one of the following disease processes/diagnoses(primary or secondary)? Other   Does the patient have Home health ordered? No   Prep survey completed? Yes            RENETTA LUIS - Registered Nurse

## 2024-07-15 ENCOUNTER — TRANSITIONAL CARE MANAGEMENT TELEPHONE ENCOUNTER (OUTPATIENT)
Dept: CALL CENTER | Facility: HOSPITAL | Age: 84
End: 2024-07-15
Payer: COMMERCIAL

## 2024-07-15 NOTE — OUTREACH NOTE
Call Center TCM Note      Flowsheet Row Responses   [facility] patient discharged from? Non-  [Horacio Cid]   Does the patient have one of the following disease processes/diagnoses(primary or secondary)? Other   TCM attempt successful? Yes   Call start time 1028   Call end time 1039   Discharge diagnosis MENDOZA/Malignant neoplasm of urinary bladder   Person spoke with today (if not patient) and relationship Spouse-Diane   Medication alerts for this patient Iron infusions weekly   Meds reviewed with patient/caregiver? Yes   Is the patient having any side effects they believe may be caused by any medication additions or changes? No   Does the patient have all medications ordered at discharge? Yes   Is the patient taking all medications as directed (includes completed medication regime)? Yes   Comments Hospital d/c f/u appt on 7/22/24 @10am   Does the patient have an appointment with their PCP within 7-14 days of discharge? Yes   Has home health visited the patient within 72 hours of discharge? N/A   Psychosocial issues? No   Comments Bs have been good   Did the patient receive a copy of their discharge instructions? Yes   What is the patient's perception of their health status since discharge? Improving   Is the patient/caregiver able to teach back the hierarchy of who to call/visit for symptoms/problems? PCP, Specialist, Home health nurse, Urgent Care, ED, 911 Yes   TCM call completed? Yes   Call end time 1039   Would this patient benefit from a Referral to Amb Social Work? No   Is the patient interested in additional calls from an ambulatory ? No            Sharla De Los Santos RN    7/15/2024, 10:39 EDT

## 2024-07-22 PROBLEM — N39.0 ACUTE UTI: Status: RESOLVED | Noted: 2024-05-15 | Resolved: 2024-07-22

## 2024-07-22 PROBLEM — N18.31 STAGE 3A CHRONIC KIDNEY DISEASE: Status: ACTIVE | Noted: 2024-07-22

## 2024-07-22 PROBLEM — N18.9 ACUTE ON CHRONIC RENAL INSUFFICIENCY: Status: ACTIVE | Noted: 2024-07-22

## 2024-07-22 PROBLEM — N18.31 STAGE 3A CHRONIC KIDNEY DISEASE: Chronic | Status: ACTIVE | Noted: 2024-07-22

## 2024-07-22 PROBLEM — N28.9 ACUTE ON CHRONIC RENAL INSUFFICIENCY: Status: ACTIVE | Noted: 2024-07-22

## 2024-07-24 ENCOUNTER — READMISSION MANAGEMENT (OUTPATIENT)
Dept: CALL CENTER | Facility: HOSPITAL | Age: 84
End: 2024-07-24
Payer: COMMERCIAL

## 2024-07-24 NOTE — OUTREACH NOTE
Prep Survey      Flowsheet Row Responses   Denominational facility patient discharged from? Non-BH   Is LACE score < 7 ? Non- Discharge   Eligibility [facility]   Date of Admission 07/20/24   Date of Discharge 07/24/24   Discharge Disposition Home or Self Care   Discharge diagnosis Acute kidney injury superimposed on chronic kidney disease (Primary Dx),  Urinary tract infection associated with catheterization of urinary tract, unspecified indwelling urinary catheter type   Does the patient have one of the following disease processes/diagnoses(primary or secondary)? Sepsis   Prep survey completed? Yes            Mariama GOODRICH - Registered Nurse

## 2024-07-25 ENCOUNTER — TRANSITIONAL CARE MANAGEMENT TELEPHONE ENCOUNTER (OUTPATIENT)
Dept: CALL CENTER | Facility: HOSPITAL | Age: 84
End: 2024-07-25
Payer: COMMERCIAL

## 2024-07-25 NOTE — OUTREACH NOTE
Call Center TCM Note      Flowsheet Row Responses   [facility] patient discharged from? Non-   Does the patient have one of the following disease processes/diagnoses(primary or secondary)? Sepsis   TCM attempt successful? Yes   Call start time 1115   Call end time 1119   Discharge diagnosis Acute kidney injury superimposed on chronic kidney disease (Primary Dx),  Urinary tract infection associated with catheterization of urinary tract, unspecified indwelling urinary catheter type   Is patient permission given to speak with other caregiver? Yes   Person spoke with today (if not patient) and relationship Spouse-Diane   Meds reviewed with patient/caregiver? Yes   Is the patient having any side effects they believe may be caused by any medication additions or changes? No   Does the patient have all medications ordered at discharge? Yes   Is the patient taking all medications as directed (includes completed medication regime)? Yes   Comments [facility] d/c follow up 8/8/24@0930   Does the patient have an appointment with their PCP within 7-14 days of discharge? Yes   Has home health visited the patient within 72 hours of discharge? N/A   Psychosocial issues? No   Did the patient receive a copy of their discharge instructions? Yes   What is the patient's perception of their health status since discharge? Improving   Is the patient/caregiver able to teach back signs and symptoms related to disease process for when to call PCP? Yes   Is the patient/caregiver able to teach back signs and symptoms related to disease process for when to call 911? Yes   Is the patient/caregiver able to teach back the hierarchy of who to call/visit for symptoms/problems? PCP, Specialist, Home health nurse, Urgent Care, ED, 911 Yes   TCM call completed? Yes   Call end time 1119   Would this patient benefit from a Referral to Amb Social Work? No   Is the patient interested in additional calls from an ambulatory ? No             Ethel Bah RN    7/25/2024, 11:20 EDT

## 2024-08-09 ENCOUNTER — OFFICE VISIT (OUTPATIENT)
Dept: INTERNAL MEDICINE | Facility: CLINIC | Age: 84
End: 2024-08-09
Payer: MEDICARE

## 2024-08-09 VITALS
HEIGHT: 70 IN | WEIGHT: 145 LBS | SYSTOLIC BLOOD PRESSURE: 122 MMHG | RESPIRATION RATE: 18 BRPM | BODY MASS INDEX: 20.76 KG/M2 | DIASTOLIC BLOOD PRESSURE: 70 MMHG

## 2024-08-09 DIAGNOSIS — R29.6 RECURRENT FALLS: ICD-10-CM

## 2024-08-09 DIAGNOSIS — R63.4 ABNORMAL WEIGHT LOSS: ICD-10-CM

## 2024-08-09 DIAGNOSIS — Z79.4 TYPE 2 DIABETES MELLITUS WITH HYPERGLYCEMIA, WITH LONG-TERM CURRENT USE OF INSULIN: ICD-10-CM

## 2024-08-09 DIAGNOSIS — R29.898 WEAKNESS OF BOTH LOWER EXTREMITIES: ICD-10-CM

## 2024-08-09 DIAGNOSIS — C67.9 MALIGNANT NEOPLASM OF URINARY BLADDER, UNSPECIFIED SITE: ICD-10-CM

## 2024-08-09 DIAGNOSIS — Z09 HOSPITAL DISCHARGE FOLLOW-UP: Primary | ICD-10-CM

## 2024-08-09 DIAGNOSIS — N18.9 ACUTE ON CHRONIC RENAL INSUFFICIENCY: ICD-10-CM

## 2024-08-09 DIAGNOSIS — E11.65 TYPE 2 DIABETES MELLITUS WITH HYPERGLYCEMIA, WITH LONG-TERM CURRENT USE OF INSULIN: ICD-10-CM

## 2024-08-09 DIAGNOSIS — Z85.46 PERSONAL HISTORY OF MALIGNANT NEOPLASM OF PROSTATE: ICD-10-CM

## 2024-08-09 DIAGNOSIS — N28.9 ACUTE ON CHRONIC RENAL INSUFFICIENCY: ICD-10-CM

## 2024-08-09 RX ORDER — CALCIUM CARBONATE 160(400)MG
1 TABLET,CHEWABLE ORAL DAILY
Qty: 1 EACH | Refills: 0 | Status: SHIPPED | OUTPATIENT
Start: 2024-08-09

## 2024-08-09 RX ORDER — INSULIN DEGLUDEC 200 U/ML
45 INJECTION, SOLUTION SUBCUTANEOUS DAILY
Start: 2024-08-09

## 2024-08-09 NOTE — PATIENT INSTRUCTIONS
"MyChart Tips:    [employer] can be a useful part of our patient care program and is a way for us to communicate lab results and for you to request refills.  Here is some information regarding the best way to use [employer] for communication.       Examples of medical issues that are APPROPRIATE for [employer]:  -Follow-up on problems we have already addressed in a visit such as home testing results, blood pressure readings, glucose readings  -Questions that can be answered with a simple \"yes\" or \"no\"  -Please keep communication concise and to the point.  All other types of communication should be held for an office visit.    Communication that is NOT APPROPRIATE for [employer]:  -New problems, serious problems or urgent problems.  Urgent matters should be addressed by phone for advice, in the office, urgent care or the ER.  -Requesting Paxlovid or Antibiotics: These types of problems require an evaluation unless your provider approved this previously.    -[employer] messages are not email.  Staff will check messages each weekday.  We strive for a 48-hour turnaround on messaging.    -[employer] is not for private issues.  Messages are received first by our office staff.    Please be mindful that sometimes it may take longer to receive a response on [employer].  Many times of the year we have high volumes of messages coming into physician inboxes.      Please also be mindful that [employer] messages become part of your permanent medical record.    We appreciate your respect for the limitations and boundaries of [employer].      Lab Results:  Please allow up to 7 business days for lab results to be sent through [employer] to you before contacting your provider.  Sometimes we are waiting for results to get back from the lab and also your provider needs time to analyze them thoroughly before making recommendations.  Also, providers may be out of the office on vacation.      While the internet has great resources, it is not a substitute for " interpreting lab results.

## 2024-08-09 NOTE — PROGRESS NOTES
Chief Complaint  Hospital Follow Up Visit    Subjective        Bennie Greene presents to [facility] PRIMARY [facility]  History of Present Illness    He is following up today after being in the hospital twice since I saw him last.  He has been dealing with urinary retention and MENDOZA in the setting of bladder cancer and prostate cancer with ureteral obstruction.  He got his care over [place] and was last discharged July 24, 2020 for.  He does have an indwelling Ennis catheter.  He has been showing persistent low-grade leukocytosis around 13,000 with bilateral hydronephrosis.  Urology is planning a cystectomy in the near future.  He has acute on chronic renal failure with creatinine around 2.37 when he left the hospital.  They held his home insulin due to his sugars getting too low and the severe protein calorie malnutrition.  Most recent A1c was 8.9 but he was having acute low blood sugars in the hospital.  PET scan is next and the surgery is hopefully on 9/16.  He is on the high performance protein shakes QID.   He is also having recurrent falls from the weakness.      Current Outpatient Medications:     BD Pen Needle Nola 2nd Gen 32G X 4 MM misc, USE WITH INSULIN EVERY DAY, Disp: 100 each, Rfl: 3    Blood Glucose Monitoring Suppl (Blood Glucose Monitor System) w/Device kit, Use to test 2 (Two) Times a Day., Disp: 1 each, Rfl: 0    cephalexin (KEFLEX) 500 MG capsule, Take 1 capsule by mouth 2 (Two) Times a Day for 14 days., Disp: 28 capsule, Rfl: 0    dexAMETHasone (DECADRON) 4 MG tablet, Take 2 tablets with food the day after chemotherapy.  Then take 2 tablets twice daily with meals for 2 days for nausea prevention.., Disp: 20 tablet, Rfl: 3    fluconazole (DIFLUCAN) 100 MG tablet, Take 1 tablet by mouth daily for 7 days., Disp: 7 tablet, Rfl: 0    glucose blood test strip, Use one test strip 2 (Two) Times a Day. Use as instructed, Disp: 200 each, Rfl: 11    HYDROcodone-acetaminophen (NORCO) 5-325 MG per  tablet, Take 1 tablet by mouth Every 6 (Six) Hours As Needed for Pain, Disp: 60 tablet, Rfl: 0    Insulin Lispro, 1 Unit Dial, (HumaLOG KwikPen) 100 UNIT/ML solution pen-injector, Inject 5 Units under the skin Daily before dinner if BS > 150. Start eating within 5 minutes of injection. Will adjust dose with provider. Max dose 40 units daily, Disp: 15 mL, Rfl: 0    Lancets misc, Use 1 Daily to check blood sugar, Disp: 100 each, Rfl: 1    levothyroxine (SYNTHROID, LEVOTHROID) 50 MCG tablet, Take 1 tablet by mouth Daily., Disp: 90 tablet, Rfl: 0    Mirabegron ER (Myrbetriq) 25 MG tablet sustained-release 24 hour 24 hr tablet, Take 1 tablet by mouth daily., Disp: 30 tablet, Rfl: 2    multivitamin (MULTIVITAMIN PO), Take 1 tablet by mouth Daily., Disp: , Rfl:     ondansetron (Zofran) 8 MG tablet, Take 1 tablet by mouth Every 8 (Eight) Hours As Needed for Nausea or Vomiting., Disp: 30 tablet, Rfl: 3    ondansetron ODT (ZOFRAN-ODT) 4 MG disintegrating tablet, Take 1 tablet by mouth every 8 (eight) hours as needed for Nausea., Disp: 30 tablet, Rfl: 0    pantoprazole (PROTONIX) 40 MG EC tablet, Take 1 tablet by mouth Daily., Disp: 90 tablet, Rfl: 3    phenazopyridine (PYRIDIUM) 200 MG tablet, Take 1 tablet by mouth 3 (three) times daily as needed for Pain for up to 2 days., Disp: 6 tablet, Rfl: 0    Probiotic Product (Jabong[employer]) capsule, Take 1 tablet by mouth Daily., Disp: 30 capsule, Rfl: 5    prochlorperazine (COMPAZINE) 10 MG tablet, Take 1 tablet by mouth Every 8 (Eight) Hours As Needed for nausea., Disp: 90 tablet, Rfl: 0    promethazine (PHENERGAN) 25 MG tablet, Take 0.5-1 tablets by mouth Every 6 (Six) Hours As Needed for Nausea, Disp: 30 tablet, Rfl: 3    tamsulosin (Flomax) 0.4 MG capsule 24 hr capsule, Take 1 capsule by mouth Daily., Disp: 30 capsule, Rfl: 11    traMADol (ULTRAM) 50 MG tablet, Take 1 tablet by mouth 2 (Two) Times a Day As Needed for Pain, Disp: 60 tablet, Rfl: 0    Tresiba FlexTouch  "200 UNIT/ML solution pen-injector pen injection, Inject 45 Units under the skin into the appropriate area as directed Daily., Disp: , Rfl:     Misc. Devices (Rollator Ultra-Light) misc, Use 1 each Daily., Disp: 1 each, Rfl: 0      Objective   Vital Signs:  /70 (BP Location: Left arm, Patient Position: Sitting, Cuff Size: Small Adult)   Resp 18   Ht 177.8 cm (70\")   Wt 65.8 kg (145 lb)   BMI 20.81 kg/m²   Estimated body mass index is 20.81 kg/m² as calculated from the following:    Height as of this encounter: 177.8 cm (70\").    Weight as of this encounter: 65.8 kg (145 lb).       BMI is within normal parameters. No other follow-up for BMI required.      Physical Exam  Vitals and nursing note reviewed.   Constitutional:       Appearance: He is ill-appearing. He is not toxic-appearing.      Comments: Appears very fatigued   Cardiovascular:      Rate and Rhythm: Normal rate and regular rhythm.      Heart sounds: Normal heart sounds. No murmur heard.  Pulmonary:      Effort: Pulmonary effort is normal.      Breath sounds: Normal breath sounds.        Result Review :    The following data was reviewed by: Travon Shah MD on 08/09/2024:  Common labs          7/23/2024    04:51 7/24/2024    03:49 8/8/2024    08:49   Common Labs   WBC 13.42     10.72     20.96       Hemoglobin 7.9     7.7     8.8       Hematocrit 26.4     25.8     30.2       Platelets 418     397     461          Details          This result is from an external source.             Lab Results   Component Value Date    GLUCOSE 92 05/20/2024    BUN 22 05/20/2024    CREATININE 1.48 (H) 05/20/2024     05/20/2024    K 4.3 05/20/2024     05/20/2024    CALCIUM 8.4 (L) 05/20/2024    PROTEINTOT 6.3 05/17/2024    ALBUMIN 2.6 (L) 05/20/2024    ALT 23 05/17/2024    AST 19 05/17/2024    ALKPHOS 109 05/17/2024    BILITOT 0.2 05/17/2024    GLOB 3.8 05/17/2024    AGRATIO 0.7 05/17/2024    BCR 14.9 05/20/2024    ANIONGAP 8.9 05/20/2024    EGFR 46.9 " (L) 05/20/2024         A1c-8.9 July 10, 2024    Data reviewed : Recent hospitalization notes [facility] July 24, 2024 discharge summary             Assessment and Plan     Diagnoses and all orders for this visit:    1. Hospital discharge follow-up (Primary)    2. Malignant neoplasm of urinary bladder, unspecified site  -     Misc. Devices (Rollator Ultra-Light) misc; Use 1 each Daily.  Dispense: 1 each; Refill: 0    3. Personal history of malignant neoplasm of prostate  -     Misc. Devices (Rollator Ultra-Light) misc; Use 1 each Daily.  Dispense: 1 each; Refill: 0    4. Acute on chronic renal insufficiency  -     Misc. Devices (Rollator Ultra-Light) misc; Use 1 each Daily.  Dispense: 1 each; Refill: 0    5. Type 2 diabetes mellitus with hyperglycemia, with long-term current use of insulin  -     Tresiba FlexTouch 200 UNIT/ML solution pen-injector pen injection; Inject 45 Units under the skin into the appropriate area as directed Daily.  -     Misc. Devices (Rollator Ultra-Light) misc; Use 1 each Daily.  Dispense: 1 each; Refill: 0    6. Recurrent falls  -     Misc. Devices (Rollator Ultra-Light) misc; Use 1 each Daily.  Dispense: 1 each; Refill: 0    7. Weakness of both lower extremities  -     Misc. Devices (Rollator Ultra-Light) misc; Use 1 each Daily.  Dispense: 1 each; Refill: 0    8. Abnormal weight loss  -     Misc. Devices (Rollator Ultra-Light) misc; Use 1 each Daily.  Dispense: 1 each; Refill: 0      Continue current medications.  Data reviewed as above.  I think you would benefit from a rollator so he can get around easier due to the recurrent falls.    ROLLATOR (Order: Misc DME) The beneficiary has a mobility limitation related to her prostate and bladder cancer with extremity weakness that significantly impairs his ability to participate in one or more mobility-related activities of daily living (MRADL) in the home. A mobility limitation is one that: 1. Prevents the beneficiary from accomplishing  the MRADL entirely, or 2. Places the beneficiary at reasonably determined heightened risk of morbidity or mortality secondary to the attempts to perform the MRADL, or 3. Prevents the beneficiary from completing the MRADL within a reasonable time frame; and The beneficiary is able to safely use the walker; and The functional mobility deficit can be sufficiently resolved with use of a walker.            Follow Up     Return if symptoms worsen or fail to improve.  Patient was given instructions and counseling regarding his condition or for health maintenance advice. Please see specific information pulled into the AVS if appropriate.

## 2024-08-12 ENCOUNTER — SPECIALTY PHARMACY (OUTPATIENT)
Dept: ENDOCRINOLOGY | Age: 84
End: 2024-08-12
Payer: COMMERCIAL

## 2024-08-12 DIAGNOSIS — E11.65 TYPE 2 DIABETES MELLITUS WITH HYPERGLYCEMIA, WITH LONG-TERM CURRENT USE OF INSULIN: ICD-10-CM

## 2024-08-12 DIAGNOSIS — Z79.4 TYPE 2 DIABETES MELLITUS WITH HYPERGLYCEMIA, WITH LONG-TERM CURRENT USE OF INSULIN: ICD-10-CM

## 2024-08-12 RX ORDER — INSULIN DEGLUDEC 200 U/ML
38 INJECTION, SOLUTION SUBCUTANEOUS DAILY
Qty: 18 ML | Refills: 0 | Status: SHIPPED | OUTPATIENT
Start: 2024-08-12

## 2024-08-12 RX ORDER — INSULIN DEGLUDEC 200 U/ML
45 INJECTION, SOLUTION SUBCUTANEOUS DAILY
Qty: 18 ML | Refills: 0 | Status: CANCELLED | OUTPATIENT
Start: 2024-08-12

## 2024-08-12 NOTE — PROGRESS NOTES
Specialty Pharmacy Refill Coordination Note     Bennie is a 83 y.o. male contacted today regarding refills of  1 specialty medication(s).    Reviewed and verified with patient:       Specialty medication(s) and dose(s) confirmed: yes    Refill Questions      Flowsheet Row Most Recent Value   Changes to allergies? No   Changes to medications? No   New conditions or infections since last clinic visit No   Unplanned office visit, urgent care, ED, or hospital admission in the last 4 weeks  No   How does patient/caregiver feel medication is working? Good   Financial problems or insurance changes  No   Since the previous refill, were any specialty medication doses or scheduled injections missed or delayed?  No   Does this patient require a clinical escalation to a pharmacist? No            Delivery Questions      Flowsheet Row Most Recent Value   Delivery method [employer]   Delivery address verified with patient/caregiver? Yes   Delivery address Home   Number of medications in delivery 5   Medication(s) being filled and delivered Lancets, Insulin Pen Needle, Insulin Degludec, Pantoprazole Sodium (Proton Pump Inhibitors), Mirabegron   Copay verified? Yes   Copay amount tbd   Copay form of payment Credit/debit on file   Ship Date 08/13   Delivery Date 8/14   Signature Required No                   Follow-up: 25 day(s)     Shelby Alvraez, Pharmacy Technician  Specialty Pharmacy Technician

## 2024-08-12 NOTE — TELEPHONE ENCOUNTER
Rx Refill Note  Requested Prescriptions     Pending Prescriptions Disp Refills    Tresiba FlexTouch 200 UNIT/ML solution pen-injector pen injection 18 mL 0     Sig: Inject 38 Units under the skin into the appropriate area as directed Daily.      Last office visit with prescribing clinician: 6/14/2024   Last telemedicine visit with prescribing clinician: Visit date not found   Next office visit with prescribing clinician: 8/22/2024                         Would you like a call back once the refill request has been completed: [] Yes [] No    If the office needs to give you a call back, can they leave a voicemail: [] Yes [] No    Juliana Powell MA  08/12/24, 13:52 EDT

## 2024-08-22 ENCOUNTER — OFFICE VISIT (OUTPATIENT)
Dept: ENDOCRINOLOGY | Age: 84
End: 2024-08-22
Payer: MEDICARE

## 2024-08-22 VITALS
HEART RATE: 89 BPM | DIASTOLIC BLOOD PRESSURE: 80 MMHG | SYSTOLIC BLOOD PRESSURE: 126 MMHG | TEMPERATURE: 97.7 F | HEIGHT: 70 IN | BODY MASS INDEX: 20.07 KG/M2 | WEIGHT: 140.2 LBS | OXYGEN SATURATION: 99 %

## 2024-08-22 DIAGNOSIS — E11.649 TYPE 2 DIABETES MELLITUS WITH HYPOGLYCEMIA WITHOUT COMA, WITH LONG-TERM CURRENT USE OF INSULIN: Primary | ICD-10-CM

## 2024-08-22 DIAGNOSIS — Z79.4 TYPE 2 DIABETES MELLITUS WITH HYPOGLYCEMIA WITHOUT COMA, WITH LONG-TERM CURRENT USE OF INSULIN: Primary | ICD-10-CM

## 2024-08-22 PROCEDURE — 99213 OFFICE O/P EST LOW 20 MIN: CPT | Performed by: NURSE PRACTITIONER

## 2024-08-22 NOTE — TELEPHONE ENCOUNTER
Specialty Pharmacy Patient Management Program  Prescription Request     Patient currently fills medications at  Pharmacy. Needing fill(s) on the following:      Requested Prescriptions     Pending Prescriptions Disp Refills    glucagon (GLUCAGEN) 1 MG injection 1 each 1     Sig: Inject 1 mg under the skin into the appropriate area as directed 1 (One) Time As Needed for Low Blood Sugar for up to 1 dose.       Pended for ALTAGRACIA Rogers to review, and approve if appropriate.     Shelli Plummer, PharmD, MM   Clinical Speciality Pharmacist, Endocrinology   8/22/2024  09:40 EDT

## 2024-08-22 NOTE — PROGRESS NOTES
"Chief Complaint  Diabetes    Subjective        Bennie Greene presents to [facility]  History of Present Illness    Surgery next week for bladder cancer, will be admitted Monday and surgery Wednesday    Given his treatment, he has had a very low appetite and as a results hypoglycemia which is why they are in for todays visit   Tresiba dose was decreased to 26u earlier this week waiting for today's visit, reports BS this am was in the 40s   Not using any humalog     Objective   Vital Signs:  /80   Pulse 89   Temp 97.7 °F (36.5 °C) (Oral)   Ht 177.8 cm (70\")   Wt 63.6 kg (140 lb 3.2 oz)   SpO2 99%   BMI 20.12 kg/m²   Estimated body mass index is 20.12 kg/m² as calculated from the following:    Height as of this encounter: 177.8 cm (70\").    Weight as of this encounter: 63.6 kg (140 lb 3.2 oz).    BMI is within normal parameters. No other follow-up for BMI required.      Physical Exam  Vitals reviewed.   Constitutional:       General: He is not in acute distress.     Comments: Frail, weak   HENT:      Head: Normocephalic and atraumatic.   Cardiovascular:      Rate and Rhythm: Normal rate and regular rhythm.   Pulmonary:      Effort: Pulmonary effort is normal. No respiratory distress.   Musculoskeletal:         General: No signs of injury. Normal range of motion.      Cervical back: Normal range of motion and neck supple.   Skin:     General: Skin is warm and dry.   Neurological:      Motor: Weakness present.      Gait: Gait abnormal.      Comments: Using wheelchair, unsteady on feet    Psychiatric:         Mood and Affect: Mood normal.         Behavior: Behavior normal.         Thought Content: Thought content normal.         Judgment: Judgment normal.        Result Review :  The following data was reviewed by: ALTAGRACIA Rogers on 08/22/2024:  Common labs          7/24/2024    03:49 8/8/2024    08:49 8/19/2024    09:38   Common Labs   WBC 10.72     20.96     16.90     "   Hemoglobin 7.7     8.8     10.8       Hematocrit 25.8     30.2     36.4       Platelets 397     461     403          Details          This result is from an external source.                       Assessment and Plan   Diagnoses and all orders for this visit:    1. Type 2 diabetes mellitus with hypoglycemia without coma, with long-term current use of insulin (Primary)             Follow Up   No follow-ups on file.    Stop insulin now  Cgm sample started in office today to increase hypoglycemia prevention   Picking up glucagon emergency kit from [facility] pharmacy before leaving  Can update me as often as needed until admitted to hospital for guidance on blood sugar readings  Reviewed hypoglycemia plan, treatment and when to call EMS- unresponsive or unable to safely eat or drink anything to bring sugar back up, administer glucagon and call ems     Patient was given instructions and counseling regarding his condition or for health maintenance advice. Please see specific information pulled into the AVS if appropriate.       Mary Ann Carvajal, APRN

## 2024-09-05 ENCOUNTER — READMISSION MANAGEMENT (OUTPATIENT)
Dept: CALL CENTER | Facility: HOSPITAL | Age: 84
End: 2024-09-05
Payer: COMMERCIAL

## 2024-09-05 NOTE — OUTREACH NOTE
Prep Survey      Flowsheet Row Responses   [facility] patient discharged from? Non-BH   Is LACE score < 7 ? Non-BH Discharge   Eligibility [facility]   Date of Admission 08/26/24   Date of Discharge 09/04/24   Discharge Disposition Home or Self Care   Discharge diagnosis Invasive bladder cancer., radical cystectomy   Does the patient have one of the following disease processes/diagnoses(primary or secondary)? General Surgery   Prep survey completed? Yes            Mariama GOODRICH - Registered Nurse

## 2024-09-06 ENCOUNTER — TRANSITIONAL CARE MANAGEMENT TELEPHONE ENCOUNTER (OUTPATIENT)
Dept: CALL CENTER | Facility: HOSPITAL | Age: 84
End: 2024-09-06
Payer: COMMERCIAL

## 2024-09-06 NOTE — OUTREACH NOTE
Call Center TCM Note      Flowsheet Row Responses   [facility] patient discharged from? Non-BH   Does the patient have one of the following disease processes/diagnoses(primary or secondary)? General Surgery   TCM attempt successful? No   Unsuccessful attempts Attempt 2            Jewels Pearl RN    9/6/2024, 16:29 EDT

## 2024-09-06 NOTE — OUTREACH NOTE
Call Center TCM Note      Flowsheet Row Responses   [facility], [place] patient discharged from? Non-BH   Does the patient have one of the following disease processes/diagnoses(primary or secondary)? General Surgery   TCM attempt successful? No   Unsuccessful attempts Attempt 1            Jewels Pearl RN    9/6/2024, 15:29 EDT

## 2024-09-07 ENCOUNTER — TRANSITIONAL CARE MANAGEMENT TELEPHONE ENCOUNTER (OUTPATIENT)
Dept: CALL CENTER | Facility: HOSPITAL | Age: 84
End: 2024-09-07
Payer: COMMERCIAL

## 2024-09-07 NOTE — OUTREACH NOTE
Call Center TCM Note      Flowsheet Row Responses   [facility], [place] patient discharged from? Non-BH   Does the patient have one of the following disease processes/diagnoses(primary or secondary)? General Surgery   TCM attempt successful? Yes   Call start time 0929   Change in Health Status Moved to LTC/SNF/Hospice  [SNF(Signature)]   Call end time 0932   Discharge diagnosis Invasive bladder cancer., radical cystectomy   Person spoke with today (if not patient) and relationship Spouse-Diane   Does the patient have an appointment with their PCP within 7-14 days of discharge? No  [9/24/24 @ 10:45am. Medicare Wellness appt.]   Nursing Interventions Patient declined scheduling/rescheduling appointment at this time   Call end time 0932            Kina Butler RN    9/7/2024, 09:33 EDT

## 2024-09-17 PROBLEM — N17.9 AKI (ACUTE KIDNEY INJURY): Status: ACTIVE | Noted: 2024-01-01

## 2024-09-17 PROBLEM — R18.8 INTRA-ABDOMINAL FLUID COLLECTION: Status: ACTIVE | Noted: 2024-01-01

## 2024-09-17 NOTE — ED PROVIDER NOTES
EMERGENCY DEPARTMENT ENCOUNTER  Room Number:  37/37  PCP: Shanika Lovell MD  Independent Historians: Patient and Family    HPI:  Chief Complaint: had concerns including Abdominal Pain.       Context: Bennie Greene is a 83 y.o. male with a medical history of bladder cancer, diabetes, CKD who presents to the ED c/o acute weakness, abdominal pain.  Wife states patient had surgery to remove bladder approximately 3 weeks ago.  Since then patient has continued with her and has become increasingly weak to the point he cannot get up and get around.  Patient is not eating or drinking at home.  Patient has become quite cachectic.      Review of prior external notes (non-ED) -and- Review of prior external test results outside of this encounter: Discharge summary from 9//24 reviewed and notable for admission for hydration and bowel prep in preparation for radical cystectomy and ileal conduit urinary diversion.  Patient's postoperative course was unremarkable    Prescription drug monitoring program review:         PAST MEDICAL HISTORY  Active Ambulatory Problems     Diagnosis Date Noted    Type 2 diabetes mellitus with hyperglycemia, with long-term current use of insulin 04/06/2016    Personal history of malignant neoplasm of prostate 10/18/2017    Other hyperlipidemia 03/02/2020    Acquired hypothyroidism 03/02/2020    Syncope and collapse 10/31/2021    C4 cervical fracture 10/31/2021    Thumb fracture 10/31/2021    Gastroparesis 08/30/2022    Gastroesophageal reflux disease without esophagitis 08/30/2022    Pain of upper abdomen 09/28/2022    Nausea 09/28/2022    Heartburn 09/28/2022    History of Helicobacter pylori infection 09/28/2022    History of gastrectomy 09/28/2022    Chronic right hip pain 10/27/2022    Bladder cancer 05/15/2024    Acute pyelonephritis 05/20/2024    Acute on chronic renal insufficiency 07/22/2024    Stage 3a chronic kidney disease 07/22/2024     Resolved Ambulatory Problems     Diagnosis Date  Noted    Acute UTI 05/15/2024     Past Medical History:   Diagnosis Date    Diabetes mellitus     Dyspepsia     Erectile dysfunction     Gastritis 2014    GERD (gastroesophageal reflux disease)     Hypothyroidism     Pes cavus     Prostate cancer     SCC (squamous cell carcinoma)     Type 2 diabetes mellitus          PAST SURGICAL HISTORY  Past Surgical History:   Procedure Laterality Date    CHOLECYSTECTOMY  2014    Dr. Ton Killian    COLONOSCOPY  10/2014    Dr. Killian    CYSTOSCOPY W/ URETERAL STENT PLACEMENT Right 2024    Procedure: CYSTOSCOPY, RIGHT URETERAL STENT EXCHANGE;  Surgeon: Home Wright MD;  Location: [facility] OR;  Service: Urology;  Laterality: Right;    PARTIAL GASTRECTOMY  2014    Dr. Killian; for gastric outlet obstruction    UPPER GASTROINTESTINAL ENDOSCOPY           FAMILY HISTORY  Family History   Problem Relation Age of Onset    Heart attack Mother     Diabetes type II Brother     Diabetes Brother     Prostate cancer Brother     Diabetes type II Son     Breast cancer Sister          SOCIAL HISTORY  Social History     Socioeconomic History    Marital status:    Tobacco Use    Smoking status: Former     Current packs/day: 0.00     Average packs/day: 1 pack/day for 45.0 years (45.0 ttl pk-yrs)     Types: Cigarettes     Start date: 1959     Quit date:      Years since quittin.7    Smokeless tobacco: Never   Vaping Use    Vaping status: Never Used   Substance and Sexual Activity    Alcohol use: No    Drug use: No    Sexual activity: Not Currently         ALLERGIES  Patient has no known allergies.      REVIEW OF SYSTEMS  Review of Systems  Included in HPI  All systems reviewed and negative except for those discussed in HPI.      PHYSICAL EXAM    I have reviewed the triage vital signs and nursing notes.    ED Triage Vitals [24 1726]   Temp Heart Rate Resp BP SpO2   97.9 °F (36.6 °C) 82 16 140/80 97 %      Temp src Heart Rate Source  Patient Position BP Location FiO2 (%)   Oral Monitor Sitting Right arm --       Physical Exam  GENERAL: alert, no acute distress, cachectic appearing  SKIN: Warm, dry  HENT: Normocephalic, atraumatic  EYES: no scleral icterus  CV: regular rhythm, regular rate  RESPIRATORY: normal effort, lungs clear  ABDOMEN: soft, generalized tenderness to palpation with a scaphoid abdomen  MUSCULOSKELETAL: no deformity  NEURO: alert, moves all extremities, follows commands            LAB RESULTS  Recent Results (from the past 24 hour(s))   Comprehensive Metabolic Panel    Collection Time: 09/17/24  5:49 PM    Specimen: Blood   Result Value Ref Range    Glucose 173 (H) 65 - 99 mg/dL    BUN 70 (H) 8 - 23 mg/dL    Creatinine 2.13 (H) 0.76 - 1.27 mg/dL    Sodium 142 136 - 145 mmol/L    Potassium 5.5 (H) 3.5 - 5.2 mmol/L    Chloride 111 (H) 98 - 107 mmol/L    CO2 16.0 (L) 22.0 - 29.0 mmol/L    Calcium 12.2 (H) 8.6 - 10.5 mg/dL    Total Protein 7.8 6.0 - 8.5 g/dL    Albumin 3.1 (L) 3.5 - 5.2 g/dL    ALT (SGPT) 16 1 - 41 U/L    AST (SGOT) 20 1 - 40 U/L    Alkaline Phosphatase 144 (H) 39 - 117 U/L    Total Bilirubin 0.2 0.0 - 1.2 mg/dL    Globulin 4.7 gm/dL    A/G Ratio 0.7 g/dL    BUN/Creatinine Ratio 32.9 (H) 7.0 - 25.0    Anion Gap 15.0 5.0 - 15.0 mmol/L    eGFR 30.3 (L) >60.0 mL/min/1.73   Lipase    Collection Time: 09/17/24  5:49 PM    Specimen: Blood   Result Value Ref Range    Lipase 9 (L) 13 - 60 U/L   Green Top (Gel)    Collection Time: 09/17/24  5:49 PM   Result Value Ref Range    Extra Tube Hold for add-ons.    Gold Top - SST    Collection Time: 09/17/24  5:49 PM   Result Value Ref Range    Extra Tube Hold for add-ons.    Light Blue Top    Collection Time: 09/17/24  5:49 PM   Result Value Ref Range    Extra Tube Hold for add-ons.    Lavender Top    Collection Time: 09/17/24  7:17 PM   Result Value Ref Range    Extra Tube hold for add-on    CBC Auto Differential    Collection Time: 09/17/24  7:17 PM    Specimen: Blood   Result  Value Ref Range    WBC 17.27 (H) 3.40 - 10.80 10*3/mm3    RBC 4.92 4.14 - 5.80 10*6/mm3    Hemoglobin 13.2 13.0 - 17.7 g/dL    Hematocrit 42.7 37.5 - 51.0 %    MCV 86.8 79.0 - 97.0 fL    MCH 26.8 26.6 - 33.0 pg    MCHC 30.9 (L) 31.5 - 35.7 g/dL    RDW 18.1 (H) 12.3 - 15.4 %    RDW-SD 55.5 (H) 37.0 - 54.0 fl    MPV 9.8 6.0 - 12.0 fL    Platelets 400 140 - 450 10*3/mm3    Neutrophil % 88.7 (H) 42.7 - 76.0 %    Lymphocyte % 6.9 (L) 19.6 - 45.3 %    Monocyte % 3.4 (L) 5.0 - 12.0 %    Eosinophil % 0.0 (L) 0.3 - 6.2 %    Basophil % 0.2 0.0 - 1.5 %    Immature Grans % 0.8 (H) 0.0 - 0.5 %    Neutrophils, Absolute 15.33 (H) 1.70 - 7.00 10*3/mm3    Lymphocytes, Absolute 1.20 0.70 - 3.10 10*3/mm3    Monocytes, Absolute 0.58 0.10 - 0.90 10*3/mm3    Eosinophils, Absolute 0.00 0.00 - 0.40 10*3/mm3    Basophils, Absolute 0.03 0.00 - 0.20 10*3/mm3    Immature Grans, Absolute 0.13 (H) 0.00 - 0.05 10*3/mm3    nRBC 0.0 0.0 - 0.2 /100 WBC         RADIOLOGY  No Radiology Exams Resulted Within Past 24 Hours      MEDICATIONS GIVEN IN ER  Medications   sodium chloride 0.9 % flush 10 mL (has no administration in time range)   sodium chloride 0.9 % bolus 1,000 mL (1,000 mL Intravenous New Bag 9/1941)         ORDERS PLACED DURING THIS VISIT:  Orders Placed This Encounter   Procedures    CT Abdomen Pelvis Without Contrast    [place] Draw    Comprehensive Metabolic Panel    Lipase    Urinalysis With Microscopic If Indicated (No Culture) - Urine, Clean Catch    Lactic Acid, Plasma    CBC Auto Differential    NPO Diet NPO Type: Strict NPO    Undress & Gown    Insert Peripheral IV    CBC & Differential    Green Top (Gel)    Lavender Top    Gold Top - SST    Light Blue Top         OUTPATIENT MEDICATION MANAGEMENT:  Current Facility-Administered Medications Ordered in Epic   Medication Dose Route Frequency Provider Last Rate Last Admin    sodium chloride 0.9 % bolus 1,000 mL  1,000 mL Intravenous Once Brando Pérez MD 2,000 mL/hr at  09/1941 1,000 mL at 09[birth year]1941    sodium chloride 0.9 % flush 10 mL  10 mL Intravenous PRN Brando Pérez MD         Current Outpatient Medications Ordered in Epic   Medication Sig Dispense Refill    BD Pen Needle Nola 2nd Gen 32G X 4 MM misc USE WITH INSULIN EVERY  each 3    Blood Glucose Monitoring Suppl (Blood Glucose Monitor System) w/Device kit Use to test 2 (Two) Times a Day. 1 each 0    dexAMETHasone (DECADRON) 4 MG tablet Take 2 tablets with food the day after chemotherapy.  Then take 2 tablets twice daily with meals for 2 days for nausea prevention.. (Patient not taking: Reported on 8/22/2024) 20 tablet 3    fluconazole (DIFLUCAN) 100 MG tablet Take 1 tablet by mouth daily for 7 days. (Patient not taking: Reported on 8/22/2024) 7 tablet 0    glucagon (GLUCAGEN) 1 MG injection Inject 1 mg under the skin into the appropriate area as directed 1 (One) Time As Needed for Low Blood Sugar for up to 1 dose. 1 each 1    glucose blood test strip Use one test strip 2 (Two) Times a Day. Use as instructed 200 each 11    HYDROcodone-acetaminophen (NORCO) 5-325 MG per tablet Take 1 tablet by mouth Every 6 (Six) Hours As Needed for Pain 60 tablet 0    Lancets misc Use 1 Daily to check blood sugar 100 each 1    levothyroxine (SYNTHROID, LEVOTHROID) 50 MCG tablet Take 1 tablet by mouth Daily. 90 tablet 0    Mirabegron ER (Myrbetriq) 25 MG tablet sustained-release 24 hour 24 hr tablet Take 1 tablet by mouth daily. 30 tablet 2    Misc. Devices (Rollator Ultra-Light) misc Use 1 each Daily. 1 each 0    multivitamin (MULTIVITAMIN PO) Take 1 tablet by mouth Daily. (Patient not taking: Reported on 8/22/2024)      ondansetron (Zofran) 8 MG tablet Take 1 tablet by mouth Every 8 (Eight) Hours As Needed for Nausea or Vomiting. 30 tablet 3    ondansetron ODT (ZOFRAN-ODT) 4 MG disintegrating tablet Take 1 tablet by mouth every 8 (eight) hours as needed for Nausea. (Patient not taking: Reported on 8/22/2024) 30 tablet  0    pantoprazole (PROTONIX) 40 MG EC tablet Take 1 tablet by mouth Daily. 90 tablet 3    phenazopyridine (PYRIDIUM) 200 MG tablet Take 1 tablet by mouth 3 (three) times daily as needed for Pain for up to 2 days. 6 tablet 0    Probiotic Product (Sonalight) capsule Take 1 tablet by mouth Daily. 30 capsule 5    prochlorperazine (COMPAZINE) 10 MG tablet Take 1 tablet by mouth Every 8 (Eight) Hours As Needed for nausea. 90 tablet 0    promethazine (PHENERGAN) 25 MG tablet Take 0.5-1 tablets by mouth Every 6 (Six) Hours As Needed for Nausea 30 tablet 3    tamsulosin (Flomax) 0.4 MG capsule 24 hr capsule Take 1 capsule by mouth Daily. 30 capsule 11    traMADol (ULTRAM) 50 MG tablet Take 1 tablet by mouth 2 (Two) Times a Day As Needed for Pain 60 tablet 0         PROCEDURES  Procedures            PROGRESS, DATA ANALYSIS, CONSULTS, AND MEDICAL DECISION MAKING  All labs have been independently interpreted by me.  All radiology studies have been reviewed by me. All EKG's have been independently viewed and interpreted by me.  Discussion below represents my analysis of pertinent findings related to patient's condition, differential diagnosis, treatment plan and final disposition.    Differential diagnosis includes but is not limited to dehydration, MENDOZA, electrolyte abnormality, deconditioning, failure to thrive.    Clinical Scores:                   ED Course as of 09/18/24 1227   Tue Sep 17, 2024 1951 Creatinine(!): 2.13 [MW]   1951 Potassium(!): 5.5 [MW]   1951 CO2(!): 16.0 [MW]   1951 WBC(!): 17.27 [MW]   1952 Initial laboratory evaluation notable for an MENDOZA with creatinine 2.13, hyperkalemia with potassium of 5.5 and acidosis with a bicarb of 16.  Patient additionally noted to have leukocytosis.  Will obtain CT abdomen and pelvis.  Patient will ultimately require admission. [MW]   2008 Leukocytes, UA(!): Large (3+) [CC]   2020 Patient was turned over to me by Dr. Pérez.  Pending: workup and admission   [CC]    2200 Potassium(!): 5.5  Hydrating with IV fluids [CC]   2200 Creatinine(!): 2.13  Baseline 1.78 [CC]   2214 I spoke with Dr. Ya with urology.  He said it could be a lymphocele.  He said they will see the patient in consult but recommend admitting to the hospitalist. [CC]   2215 I rechecked the patient.  I discussed the patient's labs, radiology findings (including all incidental findings), diagnosis, and plan for admission. The patient understands and agrees with the plan.   [CC]   2257 Patient is now hallucinating.  May be secondary to metabolic encephalopathy.  Will CT to rule out intracranial pathology [CC]   2258 Lactate: 1.3 [CC]   2304 Spoke with KEVIN Herrera with [facility].  Reviewed history, exam, results, treatments.  She agrees admit the patient to Dr. Greenwood.  Blood cultures pending at the time of admission    [CC]      ED Course User Index  [CC] Salima Momin PA-C  [MW] Brando Pérez MD             AS OF 19:53 EDT VITALS:    BP - 140/80  HR - 82  TEMP - 97.9 °F (36.6 °C) (Oral)  O2 SATS - 97%    COMPLEXITY OF CARE  The patient requires admission.      DIAGNOSIS  Final diagnoses:   MENDOZA (acute kidney injury)   Hyperkalemia   Complicated UTI (urinary tract infection)   Free fluid in pelvis   Failure to thrive in adult   Hallucinations         DISPOSITION  ED Disposition       ED Disposition   Intended Admit    Condition   --    Comment   --                Please note that portions of this document were completed with a voice recognition program.    Note Disclaimer: At [facility], we believe that sharing information builds trust and better relationships. You are receiving this note because you recently visited [facility]. It is possible you will see health information before a provider has talked with you about it. This kind of information can be easy to misunderstand. To help you fully understand what it means for your health, we urge you to discuss this note with your provider.          Brando Pérez MD  09/18/24 0860

## 2024-09-18 PROBLEM — E43 SEVERE MALNUTRITION: Status: ACTIVE | Noted: 2024-01-01

## 2024-09-18 PROBLEM — G93.41 METABOLIC ENCEPHALOPATHY: Status: ACTIVE | Noted: 2024-01-01

## 2024-09-18 NOTE — NURSING NOTE
"   09/18/24 0938   Wound coccyx Pressure Injury   No placement date or time found.   Present on Original Admission: Yes  Location: coccyx  Primary Wound Type: Pressure Injury   Pressure Injury Stage U   Dressing Appearance moist drainage   Closure None   Base non-blanchable;moist;necrotic;yellow   Periwound blanchable;swelling;redness   Periwound Temperature warm   Periwound Skin Turgor soft   Edges open   Wound Length (cm) 1.5 cm   Wound Width (cm) 1 cm   Wound Surface Area (cm^2) 1.5 cm^2   Drainage Characteristics/Odor serous   Drainage Amount small   Wound 09/18/24 0938 Right lateral ankle Pressure Injury   Placement Date/Time: 09/18/24 0938   Present on Original Admission: Yes  Side: Right  Orientation: lateral  Location: ankle  Primary Wound Type: Pressure Injury   Pressure Injury Stage 2   Closure None   Base non-blanchable;moist;red   Periwound intact   Periwound Temperature warm   Periwound Skin Turgor soft   Edges open   Wound Length (cm) 0.6 cm   Wound Width (cm) 0.6 cm   Wound Depth (cm) 0.1 cm   Wound Surface Area (cm^2) 0.36 cm^2   Wound Volume (cm^3) 0.036 cm^3   Drainage Characteristics/Odor serosanguineous   Drainage Amount scant   Skin Interventions   Pressure Reduction Devices specialty bed utilized  (Centrella Pro + requested)   Pressure Reduction Techniques heels elevated off bed;positioned off wounds   Urostomy Ileal conduit RLQ   No Placement date: If unknown, DO NOT use \"Add Comment\" note or Placement time: If unknown, DO NOT use \"Add Comment\" note found.   Present on Admission? Select all that apply: Other hospital  Urostomy Type: Ileal conduit  Location: RLQ   Stomal Appliance 2 piece;Clean;Dry;Intact;Drainable   Stoma Appearance round;moist;red;protruding above skin level   Peristomal Assessment JAIMIE     Wound/Ostomy: We see patient per request of floor nurse regarding skin issue on Coccyx, rt lat lower leg. Patient is alert, able to turn in for assistance, upon assessment is observed, " full-thickness skin and tissue loss in the Coccyx area, Pressure injury unstageable, POA Therahoney/ Opticel dressing and Optifoam Gentle was ordered.  In addtion on rt lat Ankle over the bone, partial-thickness skin loss was also seen possibly related to Pressure injury and/or friction or Trauma, Opticel dressing ordered.  Towards left lat upper lower leg we could see skin tear, Vaseline gauze also ordered.  Wound care order and pressure ulcer preventives  measures have been implemented into [facility].   Specialty mattress, Centrella Pro + for adequate pressure redistribution and pressure relief, will be requested, unit secretary will call.  Bilateral Heesl are with intact skin and normal coloration, protective padding should be used to facilitate cushioning, they must remain off-loaded at all the time.  He is at risk for further skin problems, is completely bedridden, is contracted, impaired mobility and is incontinent, repositioning him every two hours and minimizing any skin contact with urine or stool would be beneficial.\  Ostomy care. Existing appliance remain intact, no leaking, good seal noted. Supplies left in the room, ([place] 2-piece 2 1/4, barrier ring). Please re-consult for any additional needs.  Please re-consult for any additional needs.

## 2024-09-18 NOTE — CONSULTS
Subjective     REASON FOR CONSULTATION:    Evaluation and management for bladder urothelial carcinoma                             REQUESTING PHYSICIAN:  Dr. Aurea MD    RECORDS OBTAINED:  Records of the patients history including those obtained from the referring provider were reviewed and summarized in detail.    HISTORY OF PRESENT ILLNESS:  The patient is a 84 y.o. year old male with medical history significant for bladder cancer s/p cystectomy (8/23/24) , DM2, history of prostate cancer s/p IMRT (2006), GERD and hypothyroidism who presented to [facility] ER 9/17/2024 with abdominal pain.    Patient had undergone cystectomy for muscle invasive bladder urothelial carcinoma on 8/23/2024 by Dr. Pope, urology.  Final path: Urothelial carcinoma with squamous differentiation.  pT3b Nx, positive margins.    Per wife, he has been feeling extremely weak and has lost greater than 50 pounds since the surgery.    The CT A/P performed in the ER noted an indeterminate ~5 cm complex fluid collection vs lymphadenopathy along the right pelvic sidewall.  Labs on presentation noted MENDOZA with serum creatinine of 2.13, hypercalcemia of 12.2 and leukocytosis of 17.27.    Hem/Onc has been consulted for further evaluation & management.     Past Medical History:   Diagnosis Date    Acute on chronic renal insufficiency 7/22/2024    Bladder cancer     Diabetes mellitus     Dyspepsia     Erectile dysfunction     Gastritis 04/09/2014    Marked gastritis by EGD- Dr. BRAXTON Garcia; H. Pylori +    Gastroparesis     s/p gastrectomy    GERD (gastroesophageal reflux disease)     Hypothyroidism     Other hyperlipidemia 03/02/2020    Personal history of malignant neoplasm of prostate     2006 s/p XRT    Pes cavus     Prostate cancer     and radiation    SCC (squamous cell carcinoma)     scalp/ neck    Type 2 diabetes mellitus 2015        Past Surgical History:   Procedure Laterality Date    CHOLECYSTECTOMY  05/2014    Dr. Cmailo  Maria D    COLONOSCOPY  10/2014    Dr. Killian    CYSTOSCOPY W/ URETERAL STENT PLACEMENT Right 5/17/2024    Procedure: CYSTOSCOPY, RIGHT URETERAL STENT EXCHANGE;  Surgeon: Home Wright MD;  Location: [facility] OR;  Service: Urology;  Laterality: Right;    PARTIAL GASTRECTOMY  05/2014    Dr. Killian; for gastric outlet obstruction    UPPER GASTROINTESTINAL ENDOSCOPY  2015        No current facility-administered medications on file prior to encounter.     Current Outpatient Medications on File Prior to Encounter   Medication Sig Dispense Refill    HYDROcodone-acetaminophen (NORCO) 5-325 MG per tablet Take 1 tablet by mouth Every 6 (Six) Hours As Needed for Pain 60 tablet 0    insulin degludec (Tresiba FlexTouch) 100 UNIT/ML solution pen-injector injection Inject 26 Units under the skin into the appropriate area as directed Daily.      Insulin Lispro (humaLOG) 100 UNIT/ML injection Inject 5 Units under the skin into the appropriate area as directed 4 (Four) Times a Day After Meals & at Bedtime.      levothyroxine (SYNTHROID, LEVOTHROID) 50 MCG tablet Take 1 tablet by mouth Daily. 90 tablet 0    pantoprazole (PROTONIX) 40 MG EC tablet Take 1 tablet by mouth Daily. 90 tablet 3    BD Pen Needle Nola 2nd Gen 32G X 4 MM misc USE WITH INSULIN EVERY  each 3    Blood Glucose Monitoring Suppl (Blood Glucose Monitor System) w/Device kit Use to test 2 (Two) Times a Day. 1 each 0    dexAMETHasone (DECADRON) 4 MG tablet Take 2 tablets with food the day after chemotherapy.  Then take 2 tablets twice daily with meals for 2 days for nausea prevention.. (Patient not taking: Reported on 8/22/2024) 20 tablet 3    fluconazole (DIFLUCAN) 100 MG tablet Take 1 tablet by mouth daily for 7 days. (Patient not taking: Reported on 8/22/2024) 7 tablet 0    glucagon (GLUCAGEN) 1 MG injection Inject 1 mg under the skin into the appropriate area as directed 1 (One) Time As Needed for Low Blood Sugar for up to 1 dose. 1 each 1     glucose blood test strip Use one test strip 2 (Two) Times a Day. Use as instructed 200 each 11    Lancets misc Use 1 Daily to check blood sugar 100 each 1    Mirabegron ER (Myrbetriq) 25 MG tablet sustained-release 24 hour 24 hr tablet Take 1 tablet by mouth daily. 30 tablet 2    mirtazapine (REMERON) 15 MG tablet Take 1 tablet by mouth Daily.      Misc. Devices (Rollator Ultra-Light) misc Use 1 each Daily. 1 each 0    multivitamin (MULTIVITAMIN PO) Take 1 tablet by mouth Daily. (Patient not taking: Reported on 2024)      ondansetron (Zofran) 8 MG tablet Take 1 tablet by mouth Every 8 (Eight) Hours As Needed for Nausea or Vomiting. 30 tablet 3    ondansetron ODT (ZOFRAN-ODT) 4 MG disintegrating tablet Take 1 tablet by mouth every 8 (eight) hours as needed for Nausea. (Patient not taking: Reported on 2024) 30 tablet 0    phenazopyridine (PYRIDIUM) 200 MG tablet Take 1 tablet by mouth 3 (three) times daily as needed for Pain for up to 2 days. 6 tablet 0    Probiotic Product (Dayana's One Stop Salon) capsule Take 1 tablet by mouth Daily. 30 capsule 5    prochlorperazine (COMPAZINE) 10 MG tablet Take 1 tablet by mouth Every 8 (Eight) Hours As Needed for nausea. 90 tablet 0    promethazine (PHENERGAN) 25 MG tablet Take 0.5-1 tablets by mouth Every 6 (Six) Hours As Needed for Nausea 30 tablet 3    tamsulosin (Flomax) 0.4 MG capsule 24 hr capsule Take 1 capsule by mouth Daily. 30 capsule 11    traMADol (ULTRAM) 50 MG tablet Take 1 tablet by mouth 2 (Two) Times a Day As Needed for Pain 60 tablet 0        ALLERGIES:  No Known Allergies     Social History     Socioeconomic History    Marital status:    Tobacco Use    Smoking status: Former     Current packs/day: 0.00     Average packs/day: 1 pack/day for 45.0 years (45.0 ttl pk-yrs)     Types: Cigarettes     Start date: 1959     Quit date: 2004     Years since quittin.7    Smokeless tobacco: Never   Vaping Use    Vaping status: Never Used   Substance  and Sexual Activity    Alcohol use: No    Drug use: No    Sexual activity: Not Currently        Family History   Problem Relation Age of Onset    Heart attack Mother     Diabetes type II Brother     Diabetes Brother     Prostate cancer Brother     Diabetes type II Son     Breast cancer Sister         Review of Systems   As per HPI    Objective     Vitals:    09/18/24 0201 09/18/24 0300 09/18/24 0644 09/18/24 0700   BP: 144/76 148/78  148/82   BP Location:  Left arm  Left arm   Patient Position:  Lying  Lying   Pulse: 79   83   Resp:  18  18   Temp:  97.9 °F (36.6 °C)  98.1 °F (36.7 °C)   TempSrc:  Oral  Oral   SpO2: 98% 99%  99%   Weight:  53.8 kg (118 lb 9.7 oz) 53 kg (116 lb 13.5 oz)    Height:              No data to display                Physical Exam    CONSTITUTIONAL:  Vital signs reviewed.    EYES:  Conjunctiva and lids unremarkable.    EARS,NOSE,MOUTH,THROAT:  Ears and nose appear unremarkable.    RESPIRATORY:  Normal respiratory effort.   CARDIOVASCULAR:  Normal heart rate  GASTROINTESTINAL: Abdomen appears unremarkable.  Nondistended   LYMPHATIC:  No cervical, supraclavicular lymphadenopathy.  SKIN:  Warm.  No rashes.  PSYCHIATRIC:  Is confused & drowsy      RECENT LABS:  Hematology WBC   Date Value Ref Range Status   09/18/2024 15.11 (H) 3.40 - 10.80 10*3/mm3 Final     RBC   Date Value Ref Range Status   09/18/2024 4.48 4.14 - 5.80 10*6/mm3 Final     Hemoglobin   Date Value Ref Range Status   09/18/2024 11.7 (L) 13.0 - 17.7 g/dL Final     Hematocrit   Date Value Ref Range Status   09/18/2024 38.2 37.5 - 51.0 % Final     Platelets   Date Value Ref Range Status   09/18/2024 350 140 - 450 10*3/mm3 Final          Assessment & Plan   This is a 83 y/o M with:    # Muscle invasive bladder urothelial carcinoma:  Diagnosed in June 2024.   Seen by , [facility].  He could not receive neoadjuvant chemo d/t renal function.  Underwent radical cystectomy by Dr. Pope on 8/28/2024 at [facility].  Pathology: High-grade urothelial carcinoma with squamous differentiation, pT3b Nx. apical, anterior and posterior soft tissue margins positive for malignancy.  9/18/2024: Hospitalized with failure to thrive with labs showing hypercalcemia of 12.2, MENDOZA with serum creatinine of 2.13 and leukocytosis.  CT A/P concerning for right pelvic wall disease recurrence vs complex fluid collection.  I reviewed the imaging and pathologic findings with patient and his wife in detail.  Informed that radical cystectomy can be associated with significant morbidity and mortality, especially in someone with borderline performance status.  Unfortunately he has not done well since surgery - which is not unusual in cystectomy patients  His current decline appears to be secondary to recurrent, progressive malignancy which is being reflected as hypercalcemia of malignancy and possible pyelonephritis.  If not corrected, this portends poor prognosis.  Agree with IV fluids and IV antibiotics.  Nephrology has been consulted   Patient has not received any form of systemic therapy for his cancer.  His wife wishes to pursue all aggressive measures.  If patient shows remarkable recovery from this acute illness, he could be considered for treatment with Padcev and Keytruda as outpatient.  Will need palliative radiotherapy as well.    # Hypercalcemia of malignancy  Corrected calcium 12.92 on presentation  Continue IV fluids as above.   Administer IV Pamidronate 60 mg x 1 dose today  Nephrology consulted    # Suspected pyelonephritis: On IV Zosyn    # AMS: Likely hypercalcemia/pyelonephritis related    # Weight loss: Consult dietician    Recommendations:  -IV pamidronate 60 mg x 1 dose  -IV fluids  -Continue IV Zosyn  -Dietary consult  -Consider outpatient systemic therapy if shows significant recovery  -Daily CBC and CMP  -Will continue to follow    I spent 82 minutes on this encounter, before, during & after the visit evaluating the  patient, reviewing records and writing orders.

## 2024-09-18 NOTE — PLAN OF CARE
Problem: Adult Inpatient Plan of Care  Goal: Plan of Care Review  Outcome: Ongoing, Progressing  Goal: Patient-Specific Goal (Individualized)  Outcome: Ongoing, Progressing  Goal: Absence of Hospital-Acquired Illness or Injury  Outcome: Ongoing, Progressing  Intervention: Identify and Manage Fall Risk  Recent Flowsheet Documentation  Taken 9/18/2024 1840 by Nidia Perez RN  Safety Promotion/Fall Prevention:   safety round/check completed   nonskid shoes/slippers when out of bed   fall prevention program maintained  Taken 9/18/2024 1650 by Nidia Perez RN  Safety Promotion/Fall Prevention:   safety round/check completed   nonskid shoes/slippers when out of bed   fall prevention program maintained  Taken 9/18/2024 1411 by Nidia Perez RN  Safety Promotion/Fall Prevention:   fall prevention program maintained   safety round/check completed   nonskid shoes/slippers when out of bed  Taken 9/18/2024 1245 by Nidia Perez RN  Safety Promotion/Fall Prevention:   safety round/check completed   nonskid shoes/slippers when out of bed   fall prevention program maintained  Taken 9/18/2024 1002 by Nidia Perez RN  Safety Promotion/Fall Prevention:   safety round/check completed   nonskid shoes/slippers when out of bed   fall prevention program maintained  Taken 9/18/2024 0820 by Nidia Perez RN  Safety Promotion/Fall Prevention:   fall prevention program maintained   safety round/check completed   nonskid shoes/slippers when out of bed  Intervention: Prevent and Manage VTE (Venous Thromboembolism) Risk  Recent Flowsheet Documentation  Taken 9/18/2024 0820 by Nidia Perez RN  VTE Prevention/Management: sequential compression devices on  Goal: Optimal Comfort and Wellbeing  Outcome: Ongoing, Progressing  Intervention: Provide Person-Centered Care  Recent Flowsheet Documentation  Taken 9/18/2024 1411 by Nidia Perez RN  Trust Relationship/Rapport: care explained  Taken  9/18/2024 0820 by Nidia Perez RN  Trust Relationship/Rapport: care explained  Goal: Readiness for Transition of Care  Outcome: Ongoing, Progressing     Problem: Skin Injury Risk Increased  Goal: Skin Health and Integrity  Outcome: Ongoing, Progressing     Problem: Fall Injury Risk  Goal: Absence of Fall and Fall-Related Injury  Outcome: Ongoing, Progressing  Intervention: Identify and Manage Contributors  Recent Flowsheet Documentation  Taken 9/18/2024 1840 by Nidia Perez RN  Medication Review/Management: medications reviewed  Taken 9/18/2024 1650 by Nidia Perez RN  Medication Review/Management: medications reviewed  Taken 9/18/2024 1411 by Nidia Perez RN  Medication Review/Management: medications reviewed  Taken 9/18/2024 1245 by Nidia Perez RN  Medication Review/Management: medications reviewed  Taken 9/18/2024 1002 by Nidia Perez RN  Medication Review/Management: medications reviewed  Taken 9/18/2024 0820 by Nidia Perez RN  Medication Review/Management: medications reviewed  Intervention: Promote Injury-Free Environment  Recent Flowsheet Documentation  Taken 9/18/2024 1840 by Nidia Perez RN  Safety Promotion/Fall Prevention:   safety round/check completed   nonskid shoes/slippers when out of bed   fall prevention program maintained  Taken 9/18/2024 1650 by Nidia Perez RN  Safety Promotion/Fall Prevention:   safety round/check completed   nonskid shoes/slippers when out of bed   fall prevention program maintained  Taken 9/18/2024 1411 by Nidia Perez, RN  Safety Promotion/Fall Prevention:   fall prevention program maintained   safety round/check completed   nonskid shoes/slippers when out of bed  Taken 9/18/2024 1245 by Nidia Perez, RN  Safety Promotion/Fall Prevention:   safety round/check completed   nonskid shoes/slippers when out of bed   fall prevention program maintained  Taken 9/18/2024 1002 by Nidia Perez,  RN  Safety Promotion/Fall Prevention:   safety round/check completed   nonskid shoes/slippers when out of bed   fall prevention program maintained  Taken 9/18/2024 0820 by Nidia Perez RN  Safety Promotion/Fall Prevention:   fall prevention program maintained   safety round/check completed   nonskid shoes/slippers when out of bed     Problem: Malnutrition  Goal: Improved Nutritional Intake  Outcome: Ongoing, Progressing     Problem: Oral Intake Inadequate  Goal: Improved Oral Intake  Outcome: Ongoing, Progressing   Goal Outcome Evaluation:

## 2024-09-18 NOTE — PROGRESS NOTES
[facility]    Patient was seen by Dr. Chapin Garcia in July '24  Will re-direct consult to his service  Thank you nonetheless for the consideration of this consult    --Francisco Barillas MD

## 2024-09-18 NOTE — PROGRESS NOTES
[facility]: Piperacillin-Tazobactam Consult    Pt Name: Bennie Greene   : 1940    Indication: Intra-Abdominal Infection    Relevant clinical data and objective history reviewed:    Past Medical History:   Diagnosis Date    Acute on chronic renal insufficiency 2024    Bladder cancer     Diabetes mellitus     Dyspepsia     Erectile dysfunction     Gastritis 2014    Marked gastritis by EGD- Dr. BRAXTON Garcia; H. Pylori +    Gastroparesis     s/p gastrectomy    GERD (gastroesophageal reflux disease)     Hypothyroidism     Other hyperlipidemia 2020    Personal history of malignant neoplasm of prostate     2006 s/p XRT    Pes cavus     Prostate cancer     and radiation    SCC (squamous cell carcinoma)     scalp/ neck    Type 2 diabetes mellitus      Creatinine   Date Value Ref Range Status   2024 2.13 (H) 0.76 - 1.27 mg/dL Final   2024 1.78 (H) 0.76 - 1.27 mg/dL Final   2024 1.48 (H) 0.76 - 1.27 mg/dL Final   2023 1.10 0.60 - 1.30 mg/dL Final     Comment:     Serial Number: [id number]:  2021     BUN   Date Value Ref Range Status   2024 70 (H) 8 - 23 mg/dL Final     Estimated Creatinine Clearance: 24.9 mL/min (A) (by C-G formula based on SCr of 2.13 mg/dL (H)).    Lab Results   Component Value Date    WBC 17.27 (H) 2024     Temp Readings from Last 3 Encounters:   24 97.9 °F (36.6 °C) (Oral)   24 97.7 °F (36.5 °C) (Oral)   24 97.7 °F (36.5 °C) (Oral)      Assessment/Plan  Estimated CrCl >20 mL/min at this time; BMI 20.81 kg/m2  Will start piperacillin-tazobactam 3.375 g IV every 8 hours     Pharmacy will continue to follow daily while on piperacillin-tazobactam and adjust as needed. Thank you for this consult.    Florentin Jacobo [facility]  Clinical Pharmacist

## 2024-09-18 NOTE — CONSULTS
Nutrition Services    Patient Name:  Bennie Greene  YOB: 1940  MRN: [id number]  Admit Date:  9/17/2024  Assessment Date:  09/18/24    Summary: Nutrition consult for chronic poor po intake.  This is a 84 yo male with a history of bladder cancer, diabetes mellitus, chronic renal disease, who presented to the emergency department with acute weakness and abdominal pain.Pt recently had a radical cystectomy with ileoconduit.  He reports poor po intake at home.  Labs:K+ 5.5, glu 173, BUN 70, cr 2.13, alb 3.1  Skin: Coccyx pressure injury, right lateral ankle pressure injury  BMI 16.77. pt with hx of wt loss 27lb+ x 6 mo(15.5%)    Patient meets ASPEN/AND criteria for nutrition diagnosis of severe malnutrition of chronic illness based on: wt loss, inadequate po intake, and NFPE results.     Plan/Recommendation  Good po intake encouraged  Will offer supplements to help promote wt gain and wound healing.    RD to follow    CLINICAL NUTRITION ASSESSMENT      Reason for Assessment Chronic Poor Intake, Physician Consult     Diagnosis/Problem   MENDOZA, DM, bladder cancer, malnutrition   Medical/Surgical History Past Medical History:   Diagnosis Date    Acute on chronic renal insufficiency 7/22/2024    Bladder cancer     Diabetes mellitus     Dyspepsia     Erectile dysfunction     Gastritis 04/09/2014    Marked gastritis by EGD- Dr. BRAXTON Garcia; H. Pylori +    Gastroparesis     s/p gastrectomy    GERD (gastroesophageal reflux disease)     Hypothyroidism     Other hyperlipidemia 03/02/2020    Personal history of malignant neoplasm of prostate     2006 s/p XRT    Pes cavus     Prostate cancer     and radiation    SCC (squamous cell carcinoma)     scalp/ neck    Type 2 diabetes mellitus 2015       Past Surgical History:   Procedure Laterality Date    CHOLECYSTECTOMY  05/2014    Dr. Ton Killian    COLONOSCOPY  10/2014    Dr. Killian    CYSTOSCOPY W/ URETERAL STENT PLACEMENT Right 5/17/2024    Procedure: CYSTOSCOPY, RIGHT  "URETERAL STENT EXCHANGE;  Surgeon: Home Wright MD;  Location: [facility];  Service: Urology;  Laterality: Right;    PARTIAL GASTRECTOMY  05/2014    Dr. Killian; for gastric outlet obstruction    UPPER GASTROINTESTINAL ENDOSCOPY  2015        Anthropometrics        Current Height  Current Weight  BMI kg/m2 Height: 177.8 cm (70\")  Weight: 53 kg (116 lb 13.5 oz) (09/18/24 0644)  Body mass index is 16.77 kg/m².   Adjusted BMI (if applicable)    BMI Category Underweight (18.4 or below)   Ideal Body Weight (IBW) 160lb   Usual Body Weight (UBW) 170's   Weight Trend Loss   Weight History Wt Readings from Last 30 Encounters:   09/18/24 0644 53 kg (116 lb 13.5 oz)   09/18/24 0300 53.8 kg (118 lb 9.7 oz)   09/17/24 1726 65.8 kg (145 lb)   08/22/24 0905 63.6 kg (140 lb 3.2 oz)   08/09/24 0955 65.8 kg (145 lb)   06/14/24 0850 72.2 kg (159 lb 3.2 oz)   06/03/24 0956 72.6 kg (160 lb)   05/15/24 1425 73.1 kg (161 lb 1.6 oz)   03/14/24 0826 77 kg (169 lb 12.8 oz)   02/05/24 1047 78 kg (172 lb)   11/14/23 0824 79 kg (174 lb 3.2 oz)   05/12/23 1244 78.4 kg (172 lb 12.8 oz)   05/03/23 0749 78 kg (172 lb)   03/30/23 1138 80.3 kg (177 lb)   01/30/23 1331 79 kg (174 lb 3.2 oz)   12/02/22 0903 79.7 kg (175 lb 12.8 oz)   10/27/22 0825 79.6 kg (175 lb 6.4 oz)   09/28/22 1250 78.3 kg (172 lb 9.6 oz)   08/30/22 1010 78.5 kg (173 lb)   07/20/22 0815 78.9 kg (174 lb)   04/12/22 0926 80.4 kg (177 lb 3.2 oz)   04/07/22 1002 81.6 kg (180 lb)   01/11/22 0850 82.3 kg (181 lb 6.4 oz)   12/14/21 0823 78 kg (171 lb 15.3 oz)   11/01/21 1301 78 kg (171 lb 15.3 oz)   11/01/21 0503 78.4 kg (172 lb 12.8 oz)   10/31/21 0821 78.7 kg (173 lb 8 oz)   10/31/21 0955 78.5 kg (173 lb)   10/29/21 0828 79.7 kg (175 lb 9.6 oz)   08/30/21 1155 76.4 kg (168 lb 6.4 oz)   04/16/21 0812 79.2 kg (174 lb 9.6 oz)   10/01/20 0937 78.5 kg (173 lb)   03/02/20 0750 81.3 kg (179 lb 3.2 oz)   10/01/19 0652 81.5 kg (179 lb 9.6 oz)      --  Labs       Pertinent Labs  "   Results from last 7 days   Lab Units 09/18/24  0839 09/17/24  1749 09/14/24  1010   SODIUM mmol/L 145 142 145   POTASSIUM mmol/L 4.6 5.5* 4.9   CHLORIDE mmol/L 116* 111* 109*   CO2 mmol/L 15.0* 16.0* 17.0*   BUN mg/dL 72* 70* 44*   CREATININE mg/dL 2.20* 2.13* 1.78*   CALCIUM mg/dL 11.5* 12.2* 11.3*   BILIRUBIN mg/dL  --  0.2  --    ALK PHOS U/L  --  144*  --    ALT (SGPT) U/L  --  16  --    AST (SGOT) U/L  --  20  --    GLUCOSE mg/dL 150* 173* 73     Results from last 7 days   Lab Units 09/18/24  0839 09/17/24 1917 09/17/24  1749   HEMOGLOBIN g/dL 11.7*   < >  --    HEMATOCRIT % 38.2   < >  --    WBC 10*3/mm3 15.11*   < >  --    ALBUMIN g/dL  --   --  3.1*    < > = values in this interval not displayed.     Results from last 7 days   Lab Units 09/18/24  0839 09/17/24 1917   PLATELETS 10*3/mm3 350 400     COVID19   Date Value Ref Range Status   10/31/2021 Not Detected Not Detected - Ref. Range Final     Lab Results   Component Value Date    HGBA1C 6.6 (H) 08/26/2024          Medications           Scheduled Medications piperacillin-tazobactam, 3.375 g, Intravenous, Q8H       Infusions sodium chloride, 100 mL/hr, Last Rate: 100 mL/hr (09/18/24 0308)       PRN Medications   acetaminophen    senna-docusate sodium **AND** polyethylene glycol **AND** bisacodyl **AND** bisacodyl    nitroglycerin    OLANZapine    ondansetron ODT **OR** ondansetron    sodium chloride     Physical Findings          General Findings alert, disoriented, loss of muscle mass, loss of subcutaneous fat, underweight   Oral/Mouth Cavity tooth or teeth missing   Edema  no edema   Gastrointestinal abdominal distension, fecal incontinence, hypoactive bowel sounds   Skin  pressure injury: coccyx, right lateral ankle, skin tear   Tubes/Drains/Lines urostomy    NFPE See Malnutrition Severity Assessment, Date Completed: 9/18   --  Estimated/Assessed Needs        Current Weight  Weight: 53 kg (116 lb 13.5 oz) (09/18/24 0644)       Energy Requirements     Weight for Calculation 53 kg   Method for Estimation  35 kcal/kg   EST Needs (kcal/day) 1855       Protein Requirements    Weight for Calculation 53 kg   EST Protein Needs (g/kg) 1.5 gm/kg   EST Daily Needs (g/day) 80       Fluid Requirements     Method for Estimation 1 mL/kcal    EST Needs (mL/day)      Current Nutrition Orders & Evaluation of Intake       Oral Nutrition     Food Allergies NKFA   Current PO Diet Diet: Cardiac; Healthy Heart (2-3 Na+); Fluid Consistency: Thin (IDDSI 0)   Supplement n/a   PO Evaluation     % PO Intake <25%    Factors Affecting Intake: abdominal pain, decreased appetite, early satiety , weakness   --  Malnutrition Severity Assessment      Patient meets criteria for : Severe Malnutrition  Malnutrition Type (Last 8 Hours)       Malnutrition Severity Assessment       Row Name 09/18/24 0749       Malnutrition Severity Assessment    Malnutrition Type Chronic Disease - Related Malnutrition      Row Name 09/18/24 0749       Insufficient Energy Intake     Insufficient Energy Intake Findings Severe    Insufficient Energy Intake  <75% of est. energy requirement for > or equal to 1 month      Row Name 09/18/24 0749       Unintentional Weight Loss     Unintentional Weight Loss Findings Severe    Unintentional Weight Loss  Weight loss greater than 10% in six months      Row Name 09/18/24 0749       Muscle Loss    Worship Region Severe - deep hollowing/scooping, lack of muscle to touch, facial bones well defined    Clavicle Bone Region Severe - protruding prominent bone    Acromion Bone Region Severe - squared shoulders, bones, and acromion process protrusion prominent    Dorsal Hand Region Severe - prominent depression    Posterior Calf Region Severe - thin with very little definition/firmness      Row Name 09/18/24 0749       Fat Loss    Orbital Region  Moderate -  somewhat hollowness, slightly dark circles    Upper Arm Region Moderate - some fat tissue, not ample    Thoracic & Lumbar Region  Moderate - ribs visible with mild depressions, iliac crest somewhat prominent      Row Name 09/18/24 0749       Criteria Met (Must meet criteria for severity in at least 2 of these categories: M Wasting, Fat Loss, Fluid, Secondary Signs, Wt. Status, Intake)    Patient meets criteria for  Severe Malnutrition                       PES STATEMENT / NUTRITION DIAGNOSIS      Nutrition Dx Problem  Problem: Underweight, Unintentional Weight Loss, Malnutrition (moderate), and Inadequate Oral Intake  Etiology: Factors Affecting Nutrition - decrease appetite    Signs/Symptoms: NFPE Results, BMI, Unintended Weight Change, and Report/Observation     NUTRITION INTERVENTION / PLAN OF CARE      Intervention Goal(s) Reduce/improve symptoms, Meet estimated needs, Disease management/therapy, Tolerate PO , Increase intake, Appropriate weight gain, and PO intake goal %: 75+         RD Intervention/Action Interview for preferences, Encourage intake, Continue to monitor, Care plan reviewed, and Recommend/order: supplement   --      Prescription/Orders:       PO Diet       Supplements Boost      Enteral Nutrition       Parenteral Nutrition    New Prescription Ordered? Yes   --      Monitor/Evaluation Per protocol, PO intake, Supplement intake, Pertinent labs, Weight, Skin status, GI status, Symptoms   Discharge Plan/Needs Pending clinical course   --    RD to follow per protocol.      Electronically signed by:  Marcela Canas RD  09/18/24 13:18 EDT

## 2024-09-18 NOTE — NURSING NOTE
Attempt to call Inpatient Psychiatrist Consult x2 with phone number provided in order, no answering service available and no one to speak with.

## 2024-09-18 NOTE — DISCHARGE PLACEMENT REQUEST
"Bennie Greene (84 y.o. Male)       Date of Birth   1940    Social Security Number       Address   [address] [place] [facility] [address]    Home Phone   [phone]    MRN   [id number]       [place]   [facility]    Marital Status                               Admission Date   9/17/24    Admission Type   Emergency    Admitting Provider   Yuan Greenwood MD    Attending Provider   Brando Villar MD    Department, Room/Bed   [address], [address]1       Discharge Date       Discharge Disposition       Discharge Destination                                 Attending Provider: Brando Villar MD    Allergies: No Known Allergies    Isolation: Contact   Infection: Candida Auris (rule out) (09/18/24)   Code Status: CPR    Ht: 177.8 cm (70\")   Wt: 53 kg (116 lb 13.5 oz)    Admission Cmt: None   Principal Problem: MENDOZA (acute kidney injury) [N17.9]                   Active Insurance as of 9/17/2024       Primary Coverage       Payor Plan Insurance Group Employer/Plan Group    MEDICARE MEDICARE A & B        Payor Plan Address Payor Plan Phone Number Payor Plan Fax Number Effective Dates    [address] [phone]  9/1/2006 - None Entered    [facility] [address]         Subscriber Name Subscriber Birth Date Member ID       BENNIE GREENE 1940 [id number]               Secondary Coverage       Payor Plan Insurance Group Employer/Plan Group    [employer]  [id number]       Payor Plan Address Payor Plan Phone Number Payor Plan Fax Number Effective Dates    [address] [phone]  1/1/2005 - None Entered    [facility] [id number]         Subscriber Name Subscriber Birth Date Member ID       BENNIE GREENE 1940 [id number]                     Emergency Contacts        (Rel.) Home Phone Work Phone Mobile Phone    Diane Greene (Spouse) [phone] -- [phone]          "

## 2024-09-18 NOTE — H&P
Patient Name:  Bennie Greene  YOB: 1940  MRN:  [id number]  Admit Date:  9/17/2024  Patient Care Team:  Person, Shanika Kaufman MD as PCP - General (Internal Medicine)  Ramila Stanley MD as Consulting Physician (Ophthalmology)  Shila Killian MD as Surgeon (General Surgery)  Mata Wright MD as Consulting Physician (Dermatology)  Gregorio Chase MA as Medical Assistant (Internal Medicine)      Subjective   History Present Illness     Chief Complaint   Patient presents with    Abdominal Pain       Mr. Greene is a 84 y.o. male with a history of diabetes, hypothyroidism, bladder cancer status post recent radical cystectomy at [place] that presents to [facility] complaining of confusion and lack of appetite.  He has been at rehab since his surgery about 3 weeks ago and has been doing poorly.  Wife states that he has eaten very little dating back even to prior to his surgery.  She reports a 50 pound weight loss.  She states that the confusion has been new over the past 3 to 4 days.  As I am seeing the patient he is fidgety, pulling at the sheets and his gown.  He does answer questions when directly asked but mostly is disengaged and messing with the sheets and blankets as well as his wires.  She is not aware of any fevers or chills at the rehab facility.    Review of Systems   Unable to perform ROS: Mental status change        Personal History     Past Medical History:   Diagnosis Date    Acute on chronic renal insufficiency 7/22/2024    Bladder cancer     Diabetes mellitus     Dyspepsia     Erectile dysfunction     Gastritis 04/09/2014    Marked gastritis by EGD- Dr. BRAXTON Garcia; H. Pylori +    Gastroparesis     s/p gastrectomy    GERD (gastroesophageal reflux disease)     Hypothyroidism     Other hyperlipidemia 03/02/2020    Personal history of malignant neoplasm of prostate     2006 s/p XRT    Pes cavus     Prostate cancer     and radiation    SCC (squamous cell carcinoma)      scalp/ neck    Type 2 diabetes mellitus      Past Surgical History:   Procedure Laterality Date    CHOLECYSTECTOMY  2014    Dr. Ton Killian    COLONOSCOPY  10/2014    Dr. Killian    CYSTOSCOPY W/ URETERAL STENT PLACEMENT Right 2024    Procedure: CYSTOSCOPY, RIGHT URETERAL STENT EXCHANGE;  Surgeon: Home Wright MD;  Location: [facility] OR;  Service: Urology;  Laterality: Right;    PARTIAL GASTRECTOMY  2014    Dr. Killian; for gastric outlet obstruction    UPPER GASTROINTESTINAL ENDOSCOPY       Family History   Problem Relation Age of Onset    Heart attack Mother     Diabetes type II Brother     Diabetes Brother     Prostate cancer Brother     Diabetes type II Son     Breast cancer Sister      Social History     Tobacco Use    Smoking status: Former     Current packs/day: 0.00     Average packs/day: 1 pack/day for 45.0 years (45.0 ttl pk-yrs)     Types: Cigarettes     Start date: 1959     Quit date:      Years since quittin.7    Smokeless tobacco: Never   Vaping Use    Vaping status: Never Used   Substance Use Topics    Alcohol use: No    Drug use: No     No current facility-administered medications on file prior to encounter.     Current Outpatient Medications on File Prior to Encounter   Medication Sig Dispense Refill    HYDROcodone-acetaminophen (NORCO) 5-325 MG per tablet Take 1 tablet by mouth Every 6 (Six) Hours As Needed for Pain 60 tablet 0    insulin degludec (Tresiba FlexTouch) 100 UNIT/ML solution pen-injector injection Inject 26 Units under the skin into the appropriate area as directed Daily.      Insulin Lispro (humaLOG) 100 UNIT/ML injection Inject 5 Units under the skin into the appropriate area as directed 4 (Four) Times a Day After Meals & at Bedtime.      levothyroxine (SYNTHROID, LEVOTHROID) 50 MCG tablet Take 1 tablet by mouth Daily. 90 tablet 0    pantoprazole (PROTONIX) 40 MG EC tablet Take 1 tablet by mouth Daily. 90 tablet 3    BD Pen Needle Nola 2nd  Gen 32G X 4 MM misc USE WITH INSULIN EVERY  each 3    Blood Glucose Monitoring Suppl (Blood Glucose Monitor System) w/Device kit Use to test 2 (Two) Times a Day. 1 each 0    dexAMETHasone (DECADRON) 4 MG tablet Take 2 tablets with food the day after chemotherapy.  Then take 2 tablets twice daily with meals for 2 days for nausea prevention.. (Patient not taking: Reported on 8/22/2024) 20 tablet 3    fluconazole (DIFLUCAN) 100 MG tablet Take 1 tablet by mouth daily for 7 days. (Patient not taking: Reported on 8/22/2024) 7 tablet 0    glucagon (GLUCAGEN) 1 MG injection Inject 1 mg under the skin into the appropriate area as directed 1 (One) Time As Needed for Low Blood Sugar for up to 1 dose. 1 each 1    glucose blood test strip Use one test strip 2 (Two) Times a Day. Use as instructed 200 each 11    Lancets misc Use 1 Daily to check blood sugar 100 each 1    Mirabegron ER (Myrbetriq) 25 MG tablet sustained-release 24 hour 24 hr tablet Take 1 tablet by mouth daily. 30 tablet 2    mirtazapine (REMERON) 15 MG tablet Take 1 tablet by mouth Daily.      Misc. Devices (Rollator Ultra-Light) misc Use 1 each Daily. 1 each 0    multivitamin (MULTIVITAMIN PO) Take 1 tablet by mouth Daily. (Patient not taking: Reported on 8/22/2024)      ondansetron (Zofran) 8 MG tablet Take 1 tablet by mouth Every 8 (Eight) Hours As Needed for Nausea or Vomiting. 30 tablet 3    ondansetron ODT (ZOFRAN-ODT) 4 MG disintegrating tablet Take 1 tablet by mouth every 8 (eight) hours as needed for Nausea. (Patient not taking: Reported on 8/22/2024) 30 tablet 0    phenazopyridine (PYRIDIUM) 200 MG tablet Take 1 tablet by mouth 3 (three) times daily as needed for Pain for up to 2 days. 6 tablet 0    Probiotic Product ("[employer]") capsule Take 1 tablet by mouth Daily. 30 capsule 5    prochlorperazine (COMPAZINE) 10 MG tablet Take 1 tablet by mouth Every 8 (Eight) Hours As Needed for nausea. 90 tablet 0    promethazine (PHENERGAN) 25 MG  tablet Take 0.5-1 tablets by mouth Every 6 (Six) Hours As Needed for Nausea 30 tablet 3    tamsulosin (Flomax) 0.4 MG capsule 24 hr capsule Take 1 capsule by mouth Daily. 30 capsule 11    traMADol (ULTRAM) 50 MG tablet Take 1 tablet by mouth 2 (Two) Times a Day As Needed for Pain 60 tablet 0     No Known Allergies    Objective    Objective     Vital Signs  Temp:  [97.9 °F (36.6 °C)-98.1 °F (36.7 °C)] 97.9 °F (36.6 °C)  Heart Rate:  [77-83] 77  Resp:  [16-18] 18  BP: (104-148)/(75-92) 146/92  SpO2:  [93 %-99 %] 98 %  on   ;   Device (Oxygen Therapy): room air  Body mass index is 16.77 kg/m².    Physical Exam  Vitals reviewed.   Constitutional:       General: He is not in acute distress.     Appearance: He is ill-appearing.   HENT:      Mouth/Throat:      Mouth: Mucous membranes are dry.   Cardiovascular:      Rate and Rhythm: Normal rate and regular rhythm.   Pulmonary:      Breath sounds: No wheezing.   Abdominal:      General: There is no distension.      Palpations: Abdomen is soft.      Tenderness: There is no abdominal tenderness.      Comments: Ileostomy RLQ.  Incision well-healed   Musculoskeletal:      Right lower leg: No edema.      Left lower leg: No edema.   Skin:     Coloration: Skin is pale.      Findings: No rash.   Neurological:      General: No focal deficit present.      Mental Status: He is disoriented.         Results Review:  I reviewed the patient's new clinical results.  I reviewed the patient's new imaging results and agree with the interpretation.  I reviewed the patient's other test results and agree with the interpretation  I personally viewed and interpreted the patient's EKG/Telemetry data      Lab Results (last 24 hours)       Procedure Component Value Units Date/Time    Comprehensive Metabolic Panel [[id number]]  (Abnormal) Collected: 09/17/24 1749    Specimen: Blood Updated: 09/17/24 1824     Glucose 173 mg/dL      BUN 70 mg/dL      Creatinine 2.13 mg/dL      Sodium 142 mmol/L       Potassium 5.5 mmol/L      Comment: Slight hemolysis detected by analyzer. Result may be falsely elevated.        Chloride 111 mmol/L      CO2 16.0 mmol/L      Calcium 12.2 mg/dL      Total Protein 7.8 g/dL      Albumin 3.1 g/dL      ALT (SGPT) 16 U/L      AST (SGOT) 20 U/L      Comment: Slight hemolysis detected by analyzer. Result may be falsely elevated.        Alkaline Phosphatase 144 U/L      Total Bilirubin 0.2 mg/dL      Globulin 4.7 gm/dL      A/G Ratio 0.7 g/dL      BUN/Creatinine Ratio 32.9     Anion Gap 15.0 mmol/L      eGFR 30.3 mL/min/1.73     Narrative:      GFR Normal >60  Chronic Kidney Disease <60  Kidney Failure <15    The GFR formula is only valid for adults with stable renal function between ages 18 and 70.    Lipase [[id number]]  (Abnormal) Collected: 09/17/24 1749    Specimen: Blood Updated: 09/17/24 1824     Lipase 9 U/L     CBC & Differential [[id number]]  (Abnormal) Collected: 09/17/24 1917    Specimen: Blood Updated: 09/17/24 1936    Narrative:      The following orders were created for panel order CBC & Differential.  Procedure                               Abnormality         Status                     ---------                               -----------         ------                     CBC Auto Differential[[id number]]        Abnormal            Final result                 Please view results for these tests on the individual orders.    Lactic Acid, Plasma [[id number]]  (Normal) Collected: 09/17/24 1917    Specimen: Blood Updated: 09/17/24 1953     Lactate 2.0 mmol/L     CBC Auto Differential [[id number]]  (Abnormal) Collected: 09/17/24 1917    Specimen: Blood Updated: 09/17/24 1936     WBC 17.27 10*3/mm3      RBC 4.92 10*6/mm3      Hemoglobin 13.2 g/dL      Hematocrit 42.7 %      MCV 86.8 fL      MCH 26.8 pg      MCHC 30.9 g/dL      RDW 18.1 %      RDW-SD 55.5 fl      MPV 9.8 fL      Platelets 400 10*3/mm3      Neutrophil % 88.7 %      Lymphocyte % 6.9 %      Monocyte % 3.4 %       Eosinophil % 0.0 %      Basophil % 0.2 %      Immature Grans % 0.8 %      Neutrophils, Absolute 15.33 10*3/mm3      Lymphocytes, Absolute 1.20 10*3/mm3      Monocytes, Absolute 0.58 10*3/mm3      Eosinophils, Absolute 0.00 10*3/mm3      Basophils, Absolute 0.03 10*3/mm3      Immature Grans, Absolute 0.13 10*3/mm3      nRBC 0.0 /100 WBC     Urinalysis With Microscopic If Indicated (No Culture) - Indwelling Urethral Catheter [[id number]]  (Abnormal) Collected: 09/17/24 1944    Specimen: Urine from Indwelling Urethral Catheter Updated: 09/17/24 2004     Color, UA Yellow     Appearance, UA Turbid     pH, UA 6.5     Specific Gravity, UA 1.013     Glucose, UA Negative     Ketones, UA Trace     Bilirubin, UA Negative     Blood, UA Large (3+)     Protein, UA 30 mg/dL (1+)     Leuk Esterase, UA Large (3+)     Nitrite, UA Negative     Urobilinogen, UA 0.2 E.U./dL    Urinalysis, Microscopic Only - Indwelling Urethral Catheter [[id number]]  (Abnormal) Collected: 09/17/24 1944    Specimen: Urine from Indwelling Urethral Catheter Updated: 09/17/24 2148     RBC, UA 6-10 /HPF      WBC, UA 21-50 /HPF      Bacteria, UA Trace /HPF      Squamous Epithelial Cells, UA None Seen /HPF      Methodology Manual Light Microscopy    Blood Culture - Blood, Arm, Left [[id number]] Collected: 09/17/24 2224    Specimen: Blood from Arm, Left Updated: 09/17/24 2231    Blood Culture - Blood, Arm, Right [[id number]] Collected: 09/17/24 2224    Specimen: Blood from Arm, Right Updated: 09/17/24 2231    Lactic Acid, Plasma [[id number]]  (Normal) Collected: 09/17/24 2224    Specimen: Blood from Arm, Left Updated: 09/17/24 2254     Lactate 1.3 mmol/L     Basic Metabolic Panel [[id number]]  (Abnormal) Collected: 09/18/24 0839    Specimen: Blood Updated: 09/18/24 1028     Glucose 150 mg/dL      BUN 72 mg/dL      Creatinine 2.20 mg/dL      Sodium 145 mmol/L      Potassium 4.6 mmol/L      Comment: Slight hemolysis detected by analyzer. Result may be falsely elevated.         Chloride 116 mmol/L      CO2 15.0 mmol/L      Calcium 11.5 mg/dL      BUN/Creatinine Ratio 32.7     Anion Gap 14.0 mmol/L      eGFR 29.0 mL/min/1.73     Narrative:      GFR Normal >60  Chronic Kidney Disease <60  Kidney Failure <15    The GFR formula is only valid for adults with stable renal function between ages 18 and 70.    CBC (No Diff) [[id number]]  (Abnormal) Collected: 09/18/24 0839    Specimen: Blood Updated: 09/18/24 1009     WBC 15.11 10*3/mm3      RBC 4.48 10*6/mm3      Hemoglobin 11.7 g/dL      Hematocrit 38.2 %      MCV 85.3 fL      MCH 26.1 pg      MCHC 30.6 g/dL      RDW 17.0 %      RDW-SD 52.8 fl      MPV 10.3 fL      Platelets 350 10*3/mm3     CANDIDA AURIS SCREEN - Swab, Axilla Right, Axilla Left and Groin [[id number]] Collected: 09/18/24 1411    Specimen: Swab from Axilla Right, Axilla Left and Groin Updated: 09/18/24 1418            Imaging Results (Last 24 Hours)       Procedure Component Value Units Date/Time    CT Abdomen Pelvis Without Contrast [[id number]] Collected: 09/17/24 2049     Updated: 09/18/24 1116    Narrative:      CT ABDOMEN AND PELVIS WITHOUT IV CONTRAST     HISTORY: 83-year-old male with abdominal pain. Bladder resection for  bladder cancer approximately 3 weeks ago.     TECHNIQUE: Radiation dose reduction techniques were utilized, including  automated exposure control and exposure modulation based on body size.   3 mm images were obtained through the abdomen and pelvis without the  administration of IV contrast. Compared with preoperative CT 05/16/2024.     FINDINGS: Evaluation of the postoperative pelvis in the absence of IV  contrast is grossly limited particularly when there is a paucity of  intrapelvic fat.     1. There is an indeterminant approximately 5.0 x 5.0 cm complex fluid  collection versus lymphadenopathy along the right pelvic sidewall,  series 2 image 97. There is also moderate volume complex free fluid  within the dependent aspect of the pelvis, image  108.     There is no bowel ileus or obstruction. Right mid abdominal ileal  conduit appears unremarkable. There is mild distention of both renal  collecting systems. There is a 2.3 cm collection of fluid posterior to  the right kidney at the site of the pigtail portion of the right  ureteral stent present in this region on the previous CT.     2. Noncontrasted liver, spleen, pancreas, and adrenals as visualized  appear unremarkable.     3. No evidence for pneumonia or pleural effusions at the visualized  lower chest.     This report was finalized on 9/18/2024 11:13 AM by Dr. Carolina Smith M.D  on Workstation: [id number]       CT Head Without Contrast [[id number]] Collected: 09/17/24 2356     Updated: 09/17/24 2356    Narrative:        Patient: MELISSA NORIEGA  Time Out: 23:56  Exam(s): CT HEAD Without Contrast     EXAM:    CT Head Without Intravenous Contrast    CLINICAL HISTORY:     Reason for exam: hallucinations.    TECHNIQUE:    Axial computed tomography images of the head brain without intravenous   contrast.  CTDI is 55.44 mGy and DLP is 968.6 mGy-cm.  This CT exam was   performed according to the principle of ALARA (As Low As Reasonably   Achievable) by using one or more of the following dose reduction   techniques: automated exposure control, adjustment of the mA and or kV   according to patient size, and or use of iterative reconstruction   technique.    COMPARISON:    No relevant prior studies available.    FINDINGS:    Brain:  Mild white matter disease and cortical atrophy.  No hemorrhage.    Ventricles:  Unremarkable.  No ventriculomegaly.    Bones joints:  Unremarkable.  No acute fracture.    Soft tissues:  Unremarkable.    Sinuses:  Unremarkable as visualized.  No acute sinusitis.    Mastoid air cells:  Unremarkable as visualized.  No mastoid effusion.    IMPRESSION:         No acute findings in the head brain.      Impression:          Electronically signed by Antony Heredia MD on 09-17-24 at 2350                  Assessment/Plan     Active Hospital Problems    Diagnosis  POA    **MENDOZA (acute kidney injury) [N17.9]  Yes    Severe malnutrition [E43]  Yes    Metabolic encephalopathy [G93.41]  Yes    Intra-abdominal fluid collection [R18.8]  Yes    Stage 3a chronic kidney disease [N18.31]  Yes    Bladder cancer [C67.9]  Yes    Acquired hypothyroidism [E03.9]  Yes    Type 2 diabetes mellitus with hyperglycemia, with long-term current use of insulin [E11.65, Z79.4]  Not Applicable      Resolved Hospital Problems   No resolved problems to display.       Mr. Greene is a 84 y.o. male with history of diabetes, hypothyroidism, CKD 3 and recent radical cystectomy for bladder cancer presenting with failure to thrive, weight loss and now with new onset confusion due to metabolic encephalopathy and MENDOZA    Overall constellation of symptoms certainly very concerning.  A lot of this even dates back prior to the surgery so he has really been failing to thrive for quite a while.  He just does not seem to have recovered well at all from the surgery.  I am not certain there is truly any active infection although he does have significant leukocytosis and has this fluid collection so we will cover with Zosyn for now.  Urology has seen and I have reviewed their notes- no surgery felt indicated.      Oncology also saw and I discussed with Dr. Musa.  I await his formal consultation note but to be this patient seems to have dismal prognosis with poor performance status. I do not think he would be a candidate for any type of aggressive treatment unless I am missing something?  Wife seems to want accurate gauge for his prognosis and in my opinion it is quite poor though I will certainly defer to the expert opinions of oncology and urology if they feel he is a candidate for any additional treatment.  I am not convinced that he is going to recover much better than he has already.  He is already very malnourished and we are going to have to  address in short order whether or not he will need a feeding tube.    For his MENDOZA and hypercalcemia I am going to ask nephrology to see to assist with his fluid replacement and electrolyte management.  Holding any nephrotoxic meds or any meds that could be sedating or mind altering.    For the diabetes only SSI for now.    SCDs  Disposition TBD    Brando Villar MD  [facility]  09/18/24  15:13 EDT

## 2024-09-18 NOTE — ED NOTES
"Nursing report ED to floor  Bennie Greene  84 y.o.  male    HPI :  HPI (Adult)  Stated Reason for Visit: Pt to ED via EMS from [facility] for abnormal lab of elevated creatine and RLQ abdominal with no nauea and vomiting. 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 Pt is A/Ox2 at baseline and uses wheelchair.  History Obtained From: patient, EMS    Chief Complaint  Chief Complaint   Patient presents with    Abdominal Pain       Admitting doctor:   Yuan Greenwood MD    Admitting diagnosis:   The primary encounter diagnosis was MENDOZA (acute kidney injury). Diagnoses of Hyperkalemia, Complicated UTI (urinary tract infection), Free fluid in pelvis, Failure to thrive in adult, and Hallucinations were also pertinent to this visit.    Code status:   Current Code Status       Date Active Code Status Order ID Comments User Context       9/17/2024 2325 CPR (Attempt to Resuscitate) [id number]  Sharon Gracia APRN ED        Question Answer    Code Status (Patient has no pulse and is not breathing) CPR (Attempt to Resuscitate)    Medical Interventions (Patient has pulse or is breathing) Full                    Allergies:   Patient has no known allergies.    Isolation:   No active isolations    Intake and Output    Intake/Output Summary (Last 24 hours) at 9/18/2024 0049  Last data filed at 9/17/2024 2305  Gross per 24 hour   Intake 1100 ml   Output --   Net 1100 ml       Weight:       09/17/24  1726   Weight: 65.8 kg (145 lb)       Most recent vitals:   Vitals:    09/17/24 1726 09/17/24 2301 09/18/24 0001 09/18/24 0031   BP: 140/80 104/79 126/75 130/77   BP Location: Right arm      Patient Position: Sitting      Pulse: 82 80 81 77   Resp: 16      Temp: 97.9 °F (36.6 °C)      TempSrc: Oral      SpO2: 97% 95% 96% 93%   Weight: 65.8 kg (145 lb)      Height: 177.8 cm (70\")          Active LDAs/IV Access:   Lines, Drains & Airways  "      Active LDAs       Name Placement date Placement time Site Days    Peripheral IV 09/17/24 1917 Right;Lateral Antecubital 09/17/24 1917  Antecubital  less than 1                    Labs (abnormal labs have a star):   Labs Reviewed   COMPREHENSIVE METABOLIC PANEL - Abnormal; Notable for the following components:       Result Value    Glucose 173 (*)     BUN 70 (*)     Creatinine 2.13 (*)     Potassium 5.5 (*)     Chloride 111 (*)     CO2 16.0 (*)     Calcium 12.2 (*)     Albumin 3.1 (*)     Alkaline Phosphatase 144 (*)     BUN/Creatinine Ratio 32.9 (*)     eGFR 30.3 (*)     All other components within normal limits    Narrative:     GFR Normal >60  Chronic Kidney Disease <60  Kidney Failure <15    The GFR formula is only valid for adults with stable renal function between ages 18 and 70.   LIPASE - Abnormal; Notable for the following components:    Lipase 9 (*)     All other components within normal limits   URINALYSIS W/ MICROSCOPIC IF INDICATED (NO CULTURE) - Abnormal; Notable for the following components:    Appearance, UA Turbid (*)     Ketones, UA Trace (*)     Blood, UA Large (3+) (*)     Protein, UA 30 mg/dL (1+) (*)     Leuk Esterase, UA Large (3+) (*)     All other components within normal limits   CBC WITH AUTO DIFFERENTIAL - Abnormal; Notable for the following components:    WBC 17.27 (*)     MCHC 30.9 (*)     RDW 18.1 (*)     RDW-SD 55.5 (*)     Neutrophil % 88.7 (*)     Lymphocyte % 6.9 (*)     Monocyte % 3.4 (*)     Eosinophil % 0.0 (*)     Immature Grans % 0.8 (*)     Neutrophils, Absolute 15.33 (*)     Immature Grans, Absolute 0.13 (*)     All other components within normal limits   URINALYSIS, MICROSCOPIC ONLY - Abnormal; Notable for the following components:    RBC, UA 6-10 (*)     WBC, UA 21-50 (*)     Bacteria, UA Trace (*)     All other components within normal limits   LACTIC ACID, PLASMA - Normal   LACTIC ACID, PLASMA - Normal   BLOOD CULTURE   BLOOD CULTURE   RAINBOW DRAW    Narrative:      The following orders were created for panel order [place] Draw.  Procedure                               Abnormality         Status                     ---------                               -----------         ------                     Green Top (Gel)[[id number]]                                  Final result               Lavender Top[[id number]]                                     Final result               Gold Top - SST[[id number]]                                   Final result               Light Blue Top[[id number]]                                   Final result                 Please view results for these tests on the individual orders.   BASIC METABOLIC PANEL   CBC (NO DIFF)   CBC AND DIFFERENTIAL    Narrative:     The following orders were created for panel order CBC & Differential.  Procedure                               Abnormality         Status                     ---------                               -----------         ------                     CBC Auto Differential[[id number]]        Abnormal            Final result                 Please view results for these tests on the individual orders.   GREEN TOP   LAVENDER TOP   GOLD TOP - SST   LIGHT BLUE TOP       EKG:   No orders to display       Meds given in ED:   Medications   sodium chloride 0.9 % flush 10 mL (has no administration in time range)   nitroglycerin (NITROSTAT) SL tablet 0.4 mg (has no administration in time range)   acetaminophen (TYLENOL) tablet 650 mg (has no administration in time range)   sennosides-docusate (PERICOLACE) 8.6-50 MG per tablet 2 tablet (has no administration in time range)     And   polyethylene glycol (MIRALAX) packet 17 g (has no administration in time range)     And   bisacodyl (DULCOLAX) EC tablet 5 mg (has no administration in time range)     And   bisacodyl (DULCOLAX) suppository 10 mg (has no administration in time range)   ondansetron ODT (ZOFRAN-ODT) disintegrating tablet 4 mg (has no administration in time  range)     Or   ondansetron (ZOFRAN) injection 4 mg (has no administration in time range)   sodium chloride 0.9 % infusion (has no administration in time range)   Pharmacy to Dose Zosyn (has no administration in time range)   piperacillin-tazobactam (ZOSYN) 3.375 g IVPB in 100 mL NS MBP (CD) (has no administration in time range)   sodium chloride 0.9 % bolus 1,000 mL (0 mL Intravenous Stopped 24)   piperacillin-tazobactam (ZOSYN) 3.375 g IVPB in 100 mL NS MBP (CD) (0 g Intravenous Stopped 24 2305)       Imaging results:  CT Head Without Contrast    Result Date: 2024  Electronically signed by Antony Heredia MD on 24 at 2356     Ambulatory status:   - bed rest     Social issues:   Social History     Socioeconomic History    Marital status:    Tobacco Use    Smoking status: Former     Current packs/day: 0.00     Average packs/day: 1 pack/day for 45.0 years (45.0 ttl pk-yrs)     Types: Cigarettes     Start date: 1959     Quit date:      Years since quittin.7    Smokeless tobacco: Never   Vaping Use    Vaping status: Never Used   Substance and Sexual Activity    Alcohol use: No    Drug use: No    Sexual activity: Not Currently       Peripheral Neurovascular  Peripheral Neurovascular (Adult)  Peripheral Neurovascular WDL: WDL    Neuro Cognitive  Neuro Cognitive (Adult)  Cognitive/Neuro/Behavioral WDL: .WDL except, level of consciousness, mood/behavior, speech, orientation  Level of Consciousness: Alert  Orientation: disoriented to, time, situation  Speech: clear  Mood/Behavior: cooperative, hypoactive (quiet, withdrawn)    Learning  Learning Assessment (Adult)  Learning Readiness and Ability: no barriers identified    Respiratory  Respiratory WDL  Respiratory WDL: WDL    Abdominal Pain       Pain Assessments  Pain (Adult)  Preferred Pain Scale: PAINAD (Pain Assessment in Advance Dementia Scale)  PAINAD Breathin-->normal  PAINAD Negative Vocalization: 1-->occasional  moan/groan, low speech, negative/disapproving quality  PAINAD Facial Expression: 0-->smiling or inexpressive  PAINAD Body Language: 1-->tense, distressed pacing, fidgeting  PAINAD Consolability: 0-->no need to console  PAINAD Score: 2    NIH Stroke Scale       Nancy Camp RN  09/18/24 00:49 EDT

## 2024-09-18 NOTE — PLAN OF CARE
Goal Outcome Evaluation:      Pt admitted to 5[facility] from ED this morning for MENDOZA. Pt is confused, RA, VSS. Wife stated pt is not normally confused. Pt is wheelchair bound at baseline. Pt has urostomy (ileal conduit) with purulent, white drainage near stoma. UOP yellow, cloudy. Pt admitted with pressure injury to coccyx and multiple skin tears from recent fall. Wife reported multiple falls within the last week, most recent was Saturday. Wife spoke of anxiety related to spouse's care plan, requested to be called with updates from care team if not present at bedside. IV Zosyn initiated according to orders. Bed alarm on. Call light within reach. Plan of care ongoing.

## 2024-09-18 NOTE — ED PROVIDER NOTES
EMERGENCY DEPARTMENT ENCOUNTER    Room number:  37/37  Date Seen:  9/17/2024  Time of transfer:2000  PCP:  Shanika Lovell MD    Laboratory Results:  Recent Results (from the past 24 hour(s))   Comprehensive Metabolic Panel    Collection Time: 09/17/24  5:49 PM    Specimen: Blood   Result Value Ref Range    Glucose 173 (H) 65 - 99 mg/dL    BUN 70 (H) 8 - 23 mg/dL    Creatinine 2.13 (H) 0.76 - 1.27 mg/dL    Sodium 142 136 - 145 mmol/L    Potassium 5.5 (H) 3.5 - 5.2 mmol/L    Chloride 111 (H) 98 - 107 mmol/L    CO2 16.0 (L) 22.0 - 29.0 mmol/L    Calcium 12.2 (H) 8.6 - 10.5 mg/dL    Total Protein 7.8 6.0 - 8.5 g/dL    Albumin 3.1 (L) 3.5 - 5.2 g/dL    ALT (SGPT) 16 1 - 41 U/L    AST (SGOT) 20 1 - 40 U/L    Alkaline Phosphatase 144 (H) 39 - 117 U/L    Total Bilirubin 0.2 0.0 - 1.2 mg/dL    Globulin 4.7 gm/dL    A/G Ratio 0.7 g/dL    BUN/Creatinine Ratio 32.9 (H) 7.0 - 25.0    Anion Gap 15.0 5.0 - 15.0 mmol/L    eGFR 30.3 (L) >60.0 mL/min/1.73   Lipase    Collection Time: 09/17/24  5:49 PM    Specimen: Blood   Result Value Ref Range    Lipase 9 (L) 13 - 60 U/L   Green Top (Gel)    Collection Time: 09/17/24  5:49 PM   Result Value Ref Range    Extra Tube Hold for add-ons.    Gold Top - SST    Collection Time: 09/17/24  5:49 PM   Result Value Ref Range    Extra Tube Hold for add-ons.    Light Blue Top    Collection Time: 09/17/24  5:49 PM   Result Value Ref Range    Extra Tube Hold for add-ons.    Lactic Acid, Plasma    Collection Time: 09/17/24  7:17 PM    Specimen: Blood   Result Value Ref Range    Lactate 2.0 0.5 - 2.0 mmol/L   Lavender Top    Collection Time: 09/17/24  7:17 PM   Result Value Ref Range    Extra Tube hold for add-on    CBC Auto Differential    Collection Time: 09/17/24  7:17 PM    Specimen: Blood   Result Value Ref Range    WBC 17.27 (H) 3.40 - 10.80 10*3/mm3    RBC 4.92 4.14 - 5.80 10*6/mm3    Hemoglobin 13.2 13.0 - 17.7 g/dL    Hematocrit 42.7 37.5 - 51.0 %    MCV 86.8 79.0 - 97.0 fL    MCH 26.8  26.6 - 33.0 pg    MCHC 30.9 (L) 31.5 - 35.7 g/dL    RDW 18.1 (H) 12.3 - 15.4 %    RDW-SD 55.5 (H) 37.0 - 54.0 fl    MPV 9.8 6.0 - 12.0 fL    Platelets 400 140 - 450 10*3/mm3    Neutrophil % 88.7 (H) 42.7 - 76.0 %    Lymphocyte % 6.9 (L) 19.6 - 45.3 %    Monocyte % 3.4 (L) 5.0 - 12.0 %    Eosinophil % 0.0 (L) 0.3 - 6.2 %    Basophil % 0.2 0.0 - 1.5 %    Immature Grans % 0.8 (H) 0.0 - 0.5 %    Neutrophils, Absolute 15.33 (H) 1.70 - 7.00 10*3/mm3    Lymphocytes, Absolute 1.20 0.70 - 3.10 10*3/mm3    Monocytes, Absolute 0.58 0.10 - 0.90 10*3/mm3    Eosinophils, Absolute 0.00 0.00 - 0.40 10*3/mm3    Basophils, Absolute 0.03 0.00 - 0.20 10*3/mm3    Immature Grans, Absolute 0.13 (H) 0.00 - 0.05 10*3/mm3    nRBC 0.0 0.0 - 0.2 /100 WBC   Urinalysis With Microscopic If Indicated (No Culture) - Indwelling Urethral Catheter    Collection Time: 09/17/24  7:44 PM    Specimen: Indwelling Urethral Catheter; Urine   Result Value Ref Range    Color, UA Yellow Yellow, Straw    Appearance, UA Turbid (A) Clear    pH, UA 6.5 5.0 - 8.0    Specific Gravity, UA 1.013 1.005 - 1.030    Glucose, UA Negative Negative    Ketones, UA Trace (A) Negative    Bilirubin, UA Negative Negative    Blood, UA Large (3+) (A) Negative    Protein, UA 30 mg/dL (1+) (A) Negative    Leuk Esterase, UA Large (3+) (A) Negative    Nitrite, UA Negative Negative    Urobilinogen, UA 0.2 E.U./dL 0.2 - 1.0 E.U./dL   Urinalysis, Microscopic Only - Indwelling Urethral Catheter    Collection Time: 09/17/24  7:44 PM    Specimen: Indwelling Urethral Catheter; Urine   Result Value Ref Range    RBC, UA 6-10 (A) None Seen, 0-2 /HPF    WBC, UA 21-50 (A) None Seen, 0-2 /HPF    Bacteria, UA Trace (A) None Seen /HPF    Squamous Epithelial Cells, UA None Seen None Seen, 0-2 /HPF    Methodology Manual Light Microscopy    Lactic Acid, Plasma    Collection Time: 09/17/24 10:24 PM    Specimen: Arm, Left; Blood   Result Value Ref Range    Lactate 1.3 0.5 - 2.0 mmol/L     I reviewed the  above results.    Radiology:  CT Abdomen Pelvis Without Contrast    Result Date: 9/17/2024  CT ABDOMEN AND PELVIS WITHOUT IV CONTRAST  HISTORY: 83-year-old male with abdominal pain. Bladder resection for bladder cancer approximately 3 weeks ago.  TECHNIQUE: Radiation dose reduction techniques were utilized, including automated exposure control and exposure modulation based on body size. 3 mm images were obtained through the abdomen and pelvis without the administration of IV contrast. Compared with preoperative CT 05/16/2024.  FINDINGS: Evaluation of the postoperative pelvis in the absence of IV contrast is grossly limited particularly when there is a paucity of intrapelvic fat.  1. There is an indeterminant approximately 5.0 x 5.0 cm complex fluid collection versus lymphadenopathy along the right pelvic sidewall, series 2 image 97. There is also moderate volume complex free fluid within the dependent aspect of the pelvis, image 108.  There is no bowel ileus or obstruction. Right mid abdominal ileal conduit appears unremarkable. There is mild distention of both renal collecting systems. There is a 2.3 cm collection of fluid posterior to the right kidney at the site of the pigtail portion of the right ureteral stent present in this region on the previous CT.  2. Noncontrasted liver, spleen, pancreas, and adrenals as visualized appear unremarkable.  3. No evidence for pneumonia or pleural effusions at the visualized lower chest.       I reviewed the above results    Medications ordered in ED:  Medications   sodium chloride 0.9 % flush 10 mL (has no administration in time range)   nitroglycerin (NITROSTAT) SL tablet 0.4 mg (has no administration in time range)   acetaminophen (TYLENOL) tablet 650 mg (has no administration in time range)   sennosides-docusate (PERICOLACE) 8.6-50 MG per tablet 2 tablet (has no administration in time range)     And   polyethylene glycol (MIRALAX) packet 17 g (has no administration in time  range)     And   bisacodyl (DULCOLAX) EC tablet 5 mg (has no administration in time range)     And   bisacodyl (DULCOLAX) suppository 10 mg (has no administration in time range)   ondansetron ODT (ZOFRAN-ODT) disintegrating tablet 4 mg (has no administration in time range)     Or   ondansetron (ZOFRAN) injection 4 mg (has no administration in time range)   sodium chloride 0.9 % infusion (has no administration in time range)   sodium chloride 0.9 % bolus 1,000 mL (0 mL Intravenous Stopped 9/17/24 2011)   piperacillin-tazobactam (ZOSYN) 3.375 g IVPB in 100 mL NS MBP (CD) (3.375 g Intravenous New Bag 9/17/24 2235)       ED Course as of 09/17/24 2326 Tue Sep 17, 2024   1951 Creatinine(!): 2.13 [MW]   1951 Potassium(!): 5.5 [MW]   1951 CO2(!): 16.0 [MW]   1951 WBC(!): 17.27 [MW]   1952 Initial laboratory evaluation notable for an MENDOZA with creatinine 2.13, hyperkalemia with potassium of 5.5 and acidosis with a bicarb of 16.  Patient additionally noted to have leukocytosis.  Will obtain CT abdomen and pelvis.  Patient will ultimately require admission. [MW]   2008 Leukocytes, UA(!): Large (3+) [CC]   2020 Patient was turned over to me by Dr. Pérez.  Pending: workup and admission   [CC]   2200 Potassium(!): 5.5  Hydrating with IV fluids [CC]   2200 Creatinine(!): 2.13  Baseline 1.78 [CC]   2214 I spoke with Dr. Ya with urology.  He said it could be a lymphocele.  He said they will see the patient in consult but recommend admitting to the hospitalist. [CC]   2215 I rechecked the patient.  I discussed the patient's labs, radiology findings (including all incidental findings), diagnosis, and plan for admission. The patient understands and agrees with the plan.   [CC]   2257 Patient is now hallucinating.  May be secondary to metabolic encephalopathy.  Will CT to rule out intracranial pathology [CC]   2258 Lactate: 1.3 [CC]   2304 Spoke with KEVIN Herrera with A.  Reviewed history, exam, results, treatments.  She agrees  admit the patient to Dr. Greenwood.  Blood cultures pending at the time of admission    [CC]      ED Course User Index  [CC] Salima Momin PA-C  [MW] Brando Pérez MD       Diagnosis:  Final diagnoses:   MENDOZA (acute kidney injury)   Hyperkalemia   Complicated UTI (urinary tract infection)   Free fluid in pelvis   Failure to thrive in adult   Hallucinations       Provider attestation:  I personally reviewed the past medical history, past surgical history, social history, family history, current medications, and allergies as they appear in the chart.    The patient was seen and examined by myself and Dr. Pérez, who agree with plan.      Salima Momin PA-C  09/17/24 5004

## 2024-09-18 NOTE — CONSULTS
"IDENTIFYING INFORMATION: The patient is an 83-year-old white male admitted with increasing weakness.  He is seen by psychiatry related reported visual hallucinations    CHIEF COMPLAINT: None given    INFORMANT: Patient family and chart    RELIABILITY: Fair    HISTORY OF PRESENT ILLNESS: The patient is an 83-year-old white male with a history of bladder cancer, diabetes mellitus chronic renal disease who presented to the emergency department with acute weakness and abdominal pain.  The patient had surgery to remove his bladder approximately 3 weeks ago.  Since then he has become increasingly weak and has not been eating or drinking at home.  The patient is seen by psychiatry related to reported hallucinations though the patient denies these.  Unfortunately patient and family members present are particular good historians.  There is no reported history of a dementia diagnosis or other psychiatric illness.  When seen today the patient is oriented to person only.  He believes that we are in a Jewish and identifies the date as \"November 31.\"  He is able to identify the [place] president as \"Twin\".  Unfortunately, the patient does not she was to participate in formal mental status examination, choosing instead to eat as much.  He lives in a skilled nursing facility.    PAST PSYCHIATRIC HISTORY: None reported    PAST MEDICAL HISTORY: See above    MEDICATIONS:   Current Facility-Administered Medications   Medication Dose Route Frequency Provider Last Rate Last Admin    acetaminophen (TYLENOL) tablet 650 mg  650 mg Oral Q4H PRN Sharon Gracia APRN        sennosides-docusate (PERICOLACE) 8.6-50 MG per tablet 2 tablet  2 tablet Oral BID PRN Sharon Gracia APRN        And    polyethylene glycol (MIRALAX) packet 17 g  17 g Oral Daily PRN Sharon Gracia APRN        And    bisacodyl (DULCOLAX) EC tablet 5 mg  5 mg Oral Daily PRN Sharon Gracia APRN        And    bisacodyl (DULCOLAX) suppository 10 mg  10 " mg Rectal Daily PRN Sharon Gracia APRN        nitroglycerin (NITROSTAT) SL tablet 0.4 mg  0.4 mg Sublingual Q5 Min PRN Sharon Gracia APRN        ondansetron ODT (ZOFRAN-ODT) disintegrating tablet 4 mg  4 mg Oral Q6H PRN Sharon Gracia APRAMOL        Or    ondansetron (ZOFRAN) injection 4 mg  4 mg Intravenous Q6H PRN Sharon Gracia APRN        piperacillin-tazobactam (ZOSYN) 3.375 g IVPB in 100 mL NS MBP (CD)  3.375 g Intravenous Q8H Sharon Gracia, APRN   3.375 g at 09/18/24 0514    sodium chloride 0.9 % flush 10 mL  10 mL Intravenous PRN Brando Pérez MD        sodium chloride 0.9 % infusion  100 mL/hr Intravenous Continuous Sharon Gracia APRN 100 mL/hr at 09/18/24 0308 100 mL/hr at 09/18/24 0308         ALLERGIES: None    FAMILY HISTORY: Not obtained    SOCIAL HISTORY: The patient is retired from the post office and also been a .  There is no reported history of alcohol use.    MENTAL STATUS EXAM: The patient is a very thin elderly white male appearing his stated age.  He is in no apparent physical distress at the time of the examination.  He is eating his lunch hungrily.  He is awake and alert but oriented to person only though he is able to identify the first name of the .  His mood is calm his affect blunted.  Speech is impoverished.  The patient does not comply with formal testing of memory and cognition.  He denies current suicidal or homicidal ideation and denies any auditory or visual hallucinations.  He does not appear to be responding to any internal stimuli.  Judgment and insight cannot be adequately assessed but are assumed to be impaired.    ASSETS/LIABILITIES: To be assessed    DIAGNOSTIC IMPRESSION: Metabolic encephalopathy, etiology uncertain, medical problems as noted previously    PLAN: The patient's failure to comply with attempted formal mental status examination make a diagnosis difficult at this point.  He denies having  some experienced any hallucinations and apparently has no ophthalmologic pathology which would explain this phenomenon.  I will order as needed olanzapine if the patient does become agitated secondary to psychosis or hallucinations and we will continue to follow with you.

## 2024-09-18 NOTE — CASE MANAGEMENT/SOCIAL WORK
Discharge Planning Assessment  [facility]     Patient Name: Bennie Greene  MRN: [id number]  Today's Date: 9/18/2024    Admit Date: 9/17/2024    Plan: [facility]   Discharge Needs Assessment       Row Name 09/18/24 1017       Living Environment    People in Home facility resident    Current Living Arrangements extended care facility    Potentially Unsafe Housing Conditions none    Primary Care Provided by self    Provides Primary Care For no one    Family Caregiver if Needed spouse    Quality of Family Relationships involved;helpful    Able to Return to Prior Arrangements yes       Resource/Environmental Concerns    Resource/Environmental Concerns none       Transition Planning    Patient/Family Anticipates Transition to inpatient rehabilitation facility    Patient/Family Anticipated Services at Transition skilled nursing    Transportation Anticipated health plan transportation       Discharge Needs Assessment    Readmission Within the Last 30 Days unable to assess    Equipment Currently Used at Home wheelchair;walker, rolling    Concerns to be Addressed discharge planning;basic needs    Equipment Needed After Discharge none    Outpatient/Agency/Support Group Needs skilled nursing facility    Discharge Facility/Level of Care Needs nursing facility, skilled    Provided Post Acute Provider List? Yes    Post Acute Provider List Nursing Home    Delivered To Support Person    Method of Delivery Telephone                   Discharge Plan       Row Name 09/18/24 1018       Plan    Plan [facility]    Patient/Family in Agreement with Plan yes    Plan Comments Spoke with pt bedside and wife via phone. Confirmed facesheet correct. Explained role of CCP. Pt reports he lives with his wife but has been at [facility] for rehab. Spoke with John/ Som pt is skilled LOC no bed hold but can return at d/c. He is in obs status but per John pt was inpatient at [facility] on 8/26. Wife is in  agreement for pt to return to SNF. Pt will need transport arranged. Packet in office.                  Continued Care and Services - Admitted Since 9/17/2024       Destination       Service Provider Request Status Selected Services Address Phone Fax Patient Preferred    [facility] Accepted N/A [address], [place] [address] [phone] --                  Selected Continued Care - Episodes Includes continued care and service providers with selected services from the active episodes listed below      [facility] Episode start date: 1/6/2022   There are no active outsourced providers for this episode.                    Demographic Summary    No documentation.                  Functional Status    No documentation.                  Psychosocial    No documentation.                  Abuse/Neglect    No documentation.                  Legal    No documentation.                  Substance Abuse    No documentation.                  Patient Forms    No documentation.                     SEVERO Javed

## 2024-09-18 NOTE — PLAN OF CARE
Problem: Malnutrition  Goal: Improved Nutritional Intake  Outcome: Ongoing, Progressing     Problem: Oral Intake Inadequate  Goal: Improved Oral Intake  Outcome: Ongoing, Progressing   Goal Outcome Evaluation:         Good po intake encouraged  Supplements offered  RD to follow

## 2024-09-19 PROBLEM — E83.52 HYPERCALCEMIA: Status: ACTIVE | Noted: 2024-01-01

## 2024-09-19 PROBLEM — E87.0 HYPERNATREMIA: Status: ACTIVE | Noted: 2024-01-01

## 2024-09-19 NOTE — PROGRESS NOTES
Name: Bennie Greene ADMIT: 2024   : 1940  PCP: Person, Shanika Kaufman MD    MRN: [id number] LOS: 1 days   AGE/SEX: 84 y.o. male  ROOM: [facility]     Subjective   Subjective   He looks a lot better.  Still confused but voices no complaints.  Friend at bedside says that he ate about 30% of his lunch.       Objective   Objective   Vital Signs  Temp:  [97.3 °F (36.3 °C)-97.7 °F (36.5 °C)] 97.5 °F (36.4 °C)  Heart Rate:  [69-73] 72  Resp:  [16-17] 16  BP: (138-169)/(71-92) 150/92  SpO2:  [98 %-100 %] 99 %  on   ;   Device (Oxygen Therapy): room air  Body mass index is 19.64 kg/m².  Physical Exam  Vitals reviewed.   Constitutional:       General: He is not in acute distress.  HENT:      Mouth/Throat:      Mouth: Mucous membranes are moist.   Cardiovascular:      Rate and Rhythm: Normal rate and regular rhythm.   Pulmonary:      Effort: No respiratory distress.      Breath sounds: Normal breath sounds.   Abdominal:      General: There is no distension.      Palpations: Abdomen is soft.      Tenderness: There is no abdominal tenderness.   Musculoskeletal:      Right lower leg: No edema.      Left lower leg: No edema.   Skin:     General: Skin is warm and dry.      Coloration: Skin is pale.      Findings: No erythema.   Neurological:      Mental Status: He is alert. He is disoriented.       Results Review     I reviewed the patient's new clinical results.  Results from last 7 days   Lab Units 24  0410 24  0839 24  1917   WBC 10*3/mm3 11.94* 15.11* 17.27*   HEMOGLOBIN g/dL 10.5* 11.7* 13.2   PLATELETS 10*3/mm3 336 350 400     Results from last 7 days   Lab Units 24  0410 24  0839 24  1749 24  1010   SODIUM mmol/L 151* 145 142 145   POTASSIUM mmol/L 4.0 4.6 5.5* 4.9   CHLORIDE mmol/L 125* 116* 111* 109*   CO2 mmol/L 17.0* 15.0* 16.0* 17.0*   BUN mg/dL 63* 72* 70* 44*   CREATININE mg/dL 1.99* 2.20* 2.13* 1.78*   GLUCOSE mg/dL 120* 150* 173* 73   EGFR mL/min/1.73 32.7* 29.0*  30.3* 37.6*     Results from last 7 days   Lab Units 09/19/24  0410 09/17/24  1749   ALBUMIN g/dL 2.8* 3.1*   BILIRUBIN mg/dL 0.2 0.2   ALK PHOS U/L 111 144*   AST (SGOT) U/L 18 20   ALT (SGPT) U/L 17 16     Results from last 7 days   Lab Units 09/19/24  0410 09/18/24  0839 09/17/24  1749 09/14/24  1010   CALCIUM mg/dL 11.0* 11.5* 12.2* 11.3*   ALBUMIN g/dL 2.8*  --  3.1*  --      Results from last 7 days   Lab Units 09/17/24  2224 09/17/24  1917   LACTATE mmol/L 1.3 2.0     Glucose   Date/Time Value Ref Range Status   09/19/2024 1101 198 (H) 70 - 130 mg/dL Final   09/19/2024 0624 107 70 - 130 mg/dL Final   09/18/2024 2237 108 70 - 130 mg/dL Final   09/18/2024 1533 204 (H) 70 - 130 mg/dL Final       CT Head Without Contrast    Result Date: 9/17/2024  Electronically signed by Antony Heredia MD on 09-17-24 at 2356     I have personally reviewed all medications:  Scheduled Medications  insulin lispro, 2-9 Units, Subcutaneous, 4x Daily AC & at Bedtime  levothyroxine, 50 mcg, Oral, Q AM  pantoprazole, 40 mg, Oral, Daily  piperacillin-tazobactam, 3.375 g, Intravenous, Q8H    Infusions  dextrose, 75 mL/hr, Last Rate: 75 mL/hr (09/19/24 1248)    Diet  Diet: Regular/House; Fluid Consistency: Thin (IDDSI 0)    I have personally reviewed:  [x]  Laboratory   [x]  Microbiology   [x]  Radiology   [x]  EKG/Telemetry  [x]  Cardiology/Vascular   []  Pathology    []  Records       Assessment/Plan     Active Hospital Problems    Diagnosis  POA    **MENDOZA (acute kidney injury) [N17.9]  Yes    Hypercalcemia [E83.52]  Unknown    Hypernatremia [E87.0]  Unknown    Severe malnutrition [E43]  Yes    Metabolic encephalopathy [G93.41]  Yes    Intra-abdominal fluid collection [R18.8]  Yes    Stage 3a chronic kidney disease [N18.31]  Yes    Bladder cancer [C67.9]  Yes    Acquired hypothyroidism [E03.9]  Yes    Type 2 diabetes mellitus with hyperglycemia, with long-term current use of insulin [E11.65, Z79.4]  Not Applicable      [facility]  Problems   No resolved problems to display.       Mr. Greene is a 84 y.o. male with history of diabetes, hypothyroidism, CKD 3 and recent radical cystectomy for bladder cancer presenting with failure to thrive, weight loss and now with new onset confusion due to metabolic encephalopathy and MENDOZA     MENDOZA improved with hydration.  Still waiting on nephrology consult  - Hypercalcemia slightly better.  Should trend downward after getting pamidronate yesterday  - Hyponatremia this morning after getting saline overnight.  Switch to D5W and monitor closely.  Will defer to nephrology if they would like to change    Still not clear if there is truly any infection.  He remains on Zosyn empirically for the fluid collection outside the kidney.  Cultures negative to date.  Urology also following    I would agree with psychiatry note.  This gentleman seems to have dismal prognosis with very poor performance status and failure to thrive.  Tumor was said to be invasive to bone with positive margins.  Oncology yesterday seemed to indicate patient might be candidate for treatment (??).  I would defer to them regarding prognosis or any ability to treat but I would consider palliative care if they agree.  Family not here to discuss CODE STATUS at this time.    DM2 is stable.  Sort of the least of his problems at this point      SCDs  Disposition: TBD      Brando Villar MD  [facility]  09/19/24  14:25 EDT

## 2024-09-19 NOTE — PLAN OF CARE
Goal Outcome Evaluation:      Pt remains confused, RA, VSS. No acute changes this shift. Pt appears restless at times- frequently pulling off gown, blankets, telemetry leads, and O2 sensor. Pt is easily verbally redirected. Wound care completed as ordered. 1x infusion of IV Pamidronate administered. IV Zosyn administered. Bed alarm on. Call light within reach. Plan of care ongoing.

## 2024-09-19 NOTE — PROGRESS NOTES
"UROLOGY PROGRESS NOTE    NAEO. Patient reports relatively unchanged symptoms of abdomina/stomach pain.      Cr WBC improving. He is having regular BM.     Lab Results   Component Value Date    CREATININE 1.99 (H) 09/19/2024    WBC 11.94 (H) 09/19/2024    HGB 10.5 (L) 09/19/2024        Results for orders placed or performed during the hospital encounter of 09/17/24   Blood Culture - Blood, Arm, Right    Specimen: Arm, Right; Blood   Result Value Ref Range    Blood Culture No growth at 24 hours      Results from last 7 days   Lab Units 09/17/24 1944   COLOR UA  Yellow   CLARITY UA  Turbid*   BILIRUBIN UA  Negative   KETONES UA  Trace*   PH, URINE  6.5   UROBILINOGEN UA  0.2 E.U./dL   LEUKOCYTES UA  Large (3+)*       Intake/Output:    Intake/Output Summary (Last 24 hours) at 9/19/2024 0917  Last data filed at 9/19/2024 0528  Gross per 24 hour   Intake 50 ml   Output 500 ml   Net -450 ml       Exam:  /83 (BP Location: Right arm, Patient Position: Lying)   Pulse 69   Temp 97.5 °F (36.4 °C) (Oral)   Resp 17   Ht 177.8 cm (70\")   Wt 53 kg (116 lb 13.5 oz)   SpO2 100%   BMI 16.77 kg/m²     General Appearance:    Alert, cooperative, NAD                     Abdomen:     Soft, NDNT, no masses, no guarding. Ostomy intact, pink, and viable. Inc c/d/i       Assessment:    MENDOZA (acute kidney injury)    Type 2 diabetes mellitus with hyperglycemia, with long-term current use of insulin    Acquired hypothyroidism    Bladder cancer    Stage 3a chronic kidney disease    Intra-abdominal fluid collection    Severe malnutrition    Metabolic encephalopathy      Bladder cancer  Prostate cancer  History of radiation therapy  MENDOZA     Plan:      - No acute urologic intervention.  - Agree with IV fluid resuscitation for dehydration  - continue IV abx for possible pyelo    Martin Ramos MD    "

## 2024-09-19 NOTE — PLAN OF CARE
Goal Outcome Evaluation:              Outcome Evaluation: No acute changes today. VSS. Pt rested most of day. urostomy bag changed. Family to remain at bedside. WCTM.

## 2024-09-19 NOTE — PROGRESS NOTES
The patient remains confused but has been no management problem.  He is oriented x 1 when seen today.  I would recommend that given his advanced age and health status, i.e. history of bladder cancer, that his CODE STATUS be discussed with family as he remains a full code at this point.

## 2024-09-19 NOTE — PROGRESS NOTES
Subjective     HISTORY OF PRESENT ILLNESS:   No acute issues overnight.  Patient remains confused and having episodes of agitation.  Afebrile.  Vital stable.      Past Medical History, Past Surgical History, Social History, Family History have been reviewed and are without significant changes except as mentioned.    Review of Systems   A comprehensive 14 point review of systems was performed and was negative except as mentioned.    Medications:  The current medication list was reviewed in the EMR    ALLERGIES:  No Known Allergies    Objective      Vitals:    09/18/24 2024 09/18/24 2025 09/19/24 0005 09/19/24 0700   BP: 145/77 138/81 169/71 153/83   BP Location:  Right arm Right arm Right arm   Patient Position:  Lying Lying Lying   Pulse: 73 73 71 69   Resp:    17   Temp: 97.3 °F (36.3 °C) 97.6 °F (36.4 °C) 97.7 °F (36.5 °C) 97.5 °F (36.4 °C)   TempSrc:  Oral Oral Oral   SpO2: 98% 98% 100% 100%   Weight:       Height:              No data to display                Physical Exam    CONSTITUTIONAL:  Vital signs reviewed.  No distress, looks comfortable.  EYES:  Conjunctiva and lids unremarkable.    EARS,NOSE,MOUTH,THROAT:  Ears and nose appear unremarkable.    RESPIRATORY:  Normal respiratory effort.  CARDIOVASCULAR:  Normal heart rate  GASTROINTESTINAL: Abdomen appears unremarkable.  Nondistended   LYMPHATIC:  No cervical, supraclavicular lymphadenopathy.  SKIN:  Warm.  No rashes.  PSYCHIATRIC: Pleasantly confused      RECENT LABS:  Hematology WBC   Date Value Ref Range Status   09/19/2024 11.94 (H) 3.40 - 10.80 10*3/mm3 Final     RBC   Date Value Ref Range Status   09/19/2024 3.94 (L) 4.14 - 5.80 10*6/mm3 Final     Hemoglobin   Date Value Ref Range Status   09/19/2024 10.5 (L) 13.0 - 17.7 g/dL Final     Hematocrit   Date Value Ref Range Status   09/19/2024 33.8 (L) 37.5 - 51.0 % Final     Platelets   Date Value Ref Range Status   09/19/2024 336 140 - 450 10*3/mm3 Final              Assessment & Plan   This is a 84  y/o M with:     # Muscle invasive bladder urothelial carcinoma:  Diagnosed in June 2024.   Seen by , [facility].  He could not receive neoadjuvant chemo d/t renal function.  Underwent radical cystectomy by Dr. Pope on 8/28/2024 at [facility].  Pathology: High-grade urothelial carcinoma with squamous differentiation, pT3b Nx. apical, anterior and posterior soft tissue margins positive for malignancy.  9/18/2024: Hospitalized with failure to thrive with labs showing hypercalcemia of 12.2, MENDOZA with serum creatinine of 2.13 and leukocytosis.  CT A/P concerning for right pelvic wall disease recurrence vs complex fluid collection.  I reviewed the imaging and pathologic findings with patient and his wife in detail.  Informed that radical cystectomy can be associated with significant morbidity and mortality, especially in someone with borderline performance status.  Unfortunately he has not done well since surgery - which is not unusual in cystectomy patients  His current decline appears to be secondary to recurrent, progressive malignancy which is being reflected as hypercalcemia of malignancy and possible pyelonephritis.  If not corrected, this portends poor prognosis.  Agree with IV fluids and IV antibiotics.  Nephrology has been consulted   Patient has not received any form of systemic therapy for his cancer.  His wife wishes to pursue all aggressive measures.  If patient shows remarkable recovery from this acute illness, he could be considered for treatment with Padcev and Keytruda as outpatient.  Will need palliative radiotherapy as well.     # Hypercalcemia of malignancy  Corrected calcium 12.92 on presentation  Continue IV fluids as above.   S/p IV Pamidronate 60 mg x 1 dose on 9/18  Nephrology consulted     # Suspected pyelonephritis: On IV Zosyn     # AMS: Likely hypercalcemia/pyelonephritis related     # Weight loss:   Consult dietician  9/19/2024: Patient's friend (an RN) inquired about PEG  tube placement.  Informed that we need to have a goals of care discussion with patient's wife.  Due to his recurrent and progressive urothelial cancer and now altered mental status, patient has poor prognosis.  PEG tube can increase risk for aspiration.     Recommendations:  -No significant change in patient's mentation.  -Continue IV fluids and Zosyn for now  -Agree with palliative care consult to discuss goals of care  -Consider outpatient systemic therapy if shows significant recovery, although I doubt his recovery  -Daily CBC and CMP  -Will continue to follow    I spent 52 minutes on this encounter, before, during & after the visit evaluating the patient, reviewing records and writing orders.

## 2024-09-20 NOTE — CONSULTS
.            [facility] Palliative Care Services    Palliative Care Initial Consult   Attending Physician: Brando Villar*  Referring Provider: WILFREDO Pierre    Reason for Referral: assistance with clarification of goals of care and hospice referral or discussion  Family/Support: spouse and children     Code Status and Medical Interventions: CPR (Attempt to Resuscitate); Full   Ordered at: 09/17/24 2325     Code Status (Patient has no pulse and is not breathing):    CPR (Attempt to Resuscitate)     Medical Interventions (Patient has pulse or is breathing):    Full     Goals of Care: TBD.    HPI:   84 y.o. male  with history of diabetes, hypothyroidism, muscle invasive bladder urothelial carcinoma  s/p radical cystectomy and ileal conduit 8/28/2024, chronic kidney disease stage IIIa, hypothyroidism, and prostate cancer. He resided at [facility].   Patient presented to [facility] on 9/17/2024 related to confusion and lack of appetite. Workup in ER, concerning for failure to thrive, weight loss, acute kidney injury and hypercalcemia. There was some concern for pyelonephritis and he was empirically started on IV Zosyn. Consults were placed for oncology, urology, and psychiatry. Oncology saw patient and explained to spouse that patient would need to make remarkable recovery from his acute illness to be considered for treatment   Padcev and Keytruda as outpatient, along with radiotherapy. He did recommend IV Pamidronate 60 mg for hypercalcemia. Urology didn't recommend any surgical interventions. The palliative care team was consulted for support with goals of care conversation due to advanced cancer diagnosis and current state of health.   Palliative Care Spoke With: family and HCS  Quality of life: poor  Functional Status: wheelchair/bed per family   Due to the Palliative Care Topics Discussed: palliative care, goals of care, care options, resuscitation status,  [facility] scattered bed status, and discharge options we will establish an advance care plan.   Advance Care Planning   Advance Care Planning Discussion:see below          Review of Systems   Unable to perform ROS: Mental status change       1- Pain Assessment  Nonverbal Indicators of Pain: nonverbal indicators absent  PAINAD Breathin-->normal  PAINAD Negative Vocalization: 0-->none  PAINAD Facial Expression: 0-->smiling or inexpressive  PAINAD Body Language: 0-->relaxed  PAINAD Consolability: 0-->no need to console  PAINAD Score: 0    Past Medical History:   Diagnosis Date    Acute on chronic renal insufficiency 2024    Bladder cancer     Diabetes mellitus     Dyspepsia     Erectile dysfunction     Gastritis 2014    Marked gastritis by EGD- Dr. BRAXTON Garcia; H. Pylori +    Gastroparesis     s/p gastrectomy    GERD (gastroesophageal reflux disease)     Hypernatremia 2024    Hypothyroidism     Other hyperlipidemia 2020    Personal history of malignant neoplasm of prostate     2006 s/p XRT    Pes cavus     Prostate cancer     and radiation    SCC (squamous cell carcinoma)     scalp/ neck    Type 2 diabetes mellitus      Past Surgical History:   Procedure Laterality Date    CHOLECYSTECTOMY  2014    Dr. Ton Killian    COLONOSCOPY  10/2014    Dr. Killian    CYSTOSCOPY W/ URETERAL STENT PLACEMENT Right 2024    Procedure: CYSTOSCOPY, RIGHT URETERAL STENT EXCHANGE;  Surgeon: Home Wright MD;  Location: [facility];  Service: Urology;  Laterality: Right;    PARTIAL GASTRECTOMY  2014    Dr. Killian; for gastric outlet obstruction    UPPER GASTROINTESTINAL ENDOSCOPY       Social History     Socioeconomic History    Marital status:    Tobacco Use    Smoking status: Former     Current packs/day: 0.00     Average packs/day: 1 pack/day for 45.0 years (45.0 ttl pk-yrs)     Types: Cigarettes     Start date: 1959     Quit date: 2004     Years since quitting:  20.7    Smokeless tobacco: Never   Vaping Use    Vaping status: Never Used   Substance and Sexual Activity    Alcohol use: No    Drug use: No    Sexual activity: Not Currently       Current Facility-Administered Medications   Medication Dose Route Frequency Provider Last Rate Last Admin    acetaminophen (TYLENOL) tablet 650 mg  650 mg Oral Q4H PRN Sharon Gracia APRN        sennosides-docusate (PERICOLACE) 8.6-50 MG per tablet 2 tablet  2 tablet Oral BID PRN Sharon Gracia APRN        And    polyethylene glycol (MIRALAX) packet 17 g  17 g Oral Daily PRN Sharon Gracia APRN        And    bisacodyl (DULCOLAX) EC tablet 5 mg  5 mg Oral Daily PRN Sharon Gracia APRN        And    bisacodyl (DULCOLAX) suppository 10 mg  10 mg Rectal Daily PRN Sharon Gracia APRN        dextrose (D50W) (25 g/50 mL) IV injection 25 g  25 g Intravenous Q15 Min PRN Brando Villar MD        dextrose (GLUTOSE) oral gel 15 g  15 g Oral Q15 Min PRN Brando Villar MD   15 g at 09/19/24 2210    glucagon (GLUCAGEN) injection 1 mg  1 mg Intramuscular Q15 Min PRN Brando Villar MD        levothyroxine (SYNTHROID, LEVOTHROID) tablet 50 mcg  50 mcg Oral Q AM Brando Villar MD   50 mcg at 09/20/24 0508    nitroglycerin (NITROSTAT) SL tablet 0.4 mg  0.4 mg Sublingual Q5 Min PRN Sharon Gracia APRN        OLANZapine (zyPREXA) injection 5 mg  5 mg Intramuscular Q8H PRN Blaze Power III, MD        ondansetron ODT (ZOFRAN-ODT) disintegrating tablet 4 mg  4 mg Oral Q6H PRN Sharon Gracia APRN        Or    ondansetron (ZOFRAN) injection 4 mg  4 mg Intravenous Q6H PRN Sharon Gracia APRN        pantoprazole (PROTONIX) EC tablet 40 mg  40 mg Oral Daily Brando Villar MD   40 mg at 09/19/24 1126    piperacillin-tazobactam (ZOSYN) 3.375 g IVPB in 100 mL NS MBP (CD)  3.375 g Intravenous Q8H Sharon Gracia, APRN 0 mL/hr at 09/19/24 0628 3.375 g at  "09/20/24 0508    sodium chloride 0.45 % infusion  125 mL/hr Intravenous Continuous Chapin Garcia  mL/hr at 09/19/24 1910 125 mL/hr at 09/19/24 1910    sodium chloride 0.9 % flush 10 mL  10 mL Intravenous PRN Brando Pérez MD         sodium chloride, 125 mL/hr, Last Rate: 125 mL/hr (09/19/24 1910)        acetaminophen    senna-docusate sodium **AND** polyethylene glycol **AND** bisacodyl **AND** bisacodyl    dextrose    dextrose    glucagon (human recombinant)    nitroglycerin    OLANZapine    ondansetron ODT **OR** ondansetron    sodium chloride    No Known Allergies  Attest that current medications reviewed  including but not limited to prescriptions, over-the counter, herbals and vitamin/mineral/dietary (nutritional) supplements for name, route of administration, type, dose and frequency and are current using all immediate resources available at time of dictation.      Intake/Output Summary (Last 24 hours) at 9/20/2024 1007  Last data filed at 9/19/2024 1550  Gross per 24 hour   Intake --   Output 1000 ml   Net -1000 ml       Physical Exam:    Diagnostics: Reviewed  /74 (BP Location: Right arm, Patient Position: Lying)   Pulse 72   Temp 97.3 °F (36.3 °C) (Oral)   Resp 16   Ht 177.8 cm (70\")   Wt 62.1 kg (136 lb 14.5 oz)   SpO2 90%   BMI 19.64 kg/m²     Vitals reviewed.   Constitutional:       General: Sleeping.      Appearance: Not in distress. Cachectic and frail. Chronically ill-appearing.      Interventions: Nasal cannula in place.   Pulmonary:      Effort: Pulmonary effort is normal.      Breath sounds: No decreased breath sounds.   Cardiovascular:      PMI at left midclavicular line. Normal rate.   Edema:     Peripheral edema absent.   Abdominal:      General: Bowel sounds are normal.      Tenderness: There is no abdominal tenderness.   Genitourinary:     Comments: Urostomy with yellow drainage  Neurological:      Mental Status: Easily aroused. Disoriented.      Comments: Oriented to " self only    Psychiatric:         Mood and Affect: Mood and affect normal.         Speech: Speech normal.         Behavior: Behavior is cooperative.         Thought Content: Thought content normal.         Cognition and Memory: Cognition is impaired.         Judgment: Judgment normal.       Patient status: Disease state: Deteriorating despite treatments.  Functional status: Palliative Performance Scale Score: Performance 30% based on the following measures: Ambulation: Totally bed bound, Activity and Evidence of Disease: Unable to do any work, extensive evidence of disease, Self-Care: Total care required,  Intake: Reduced, LOC: Full, drowsy or confusion   ECOG Status(4) Completely disabled, unable to carry out self-care.  Totally confined to bed or chair.  Nutritional status: Albumin 2.6 on 9/20224 Body mass index is 19.64 kg/m².    Family support: The patient receives support from his wife, children, and extended family..  Advance Directives: Advance Directive Status: Patient has advance directive, copy requested   POA/Healthcare [id number].       Impression/Problem List:    Failure to thrive  Chronic kidney disease stage IIIa     Suspected pyelonephritis  Muscle invasive bladder urothelial carcinoma  s/p radical cystectomy and ileal conduit 8/28/2024  Hypercalcemia     History of prostate cancer s/p radiation   Hypothyroidism  Diabetes mellitus type II      Recommendations/Plan:  1. Provide support support with goals of care  2. DNR/DNI, no cardioversion, no artifical nutrition  3. [facility] consult          2.  Palliative care encounter  The patient is unable to participate in goals of care conversation due to impaired cognition. His spouse, Diane as bedside along with her niece (Khloe) and good friend (Polly).  Diane reports patient has living will and she is his healthcare surrogate. I have requested a copy. She does report they have three children (Grant, Alexander and Brando). Of note, Brando is  disabled and resides in her home. We spent a significant amount of time discussing patient clinical journey since being diagnosed with bladder cancer. She expressed some frustration with how things have gone and that she was  hopeful of a better outcomes.  The patient has had multiple infections, hospitalizations, attempted chemo with poor outcomes and just never recovered despite interventions. We discussed current hospitalization and she referenced recent conversations with oncology and urology. She has also had some conversations with her good friend, Polly who is an RN. She understands current barriers to patient being able to even be considered for further treatment for bladder cancer. I provided her with information regarding palliative care, inpatient palliative care unit, and [facility] (inpatient vs outpatient). She reports the patient has voiced multiple times about going home,. Of note, he was most recently in rehab setting, where he was mainly in wheelchair and needing assistance with feeding (typically ate more). We discussed CODE status and medical interventions and she wishes for him to be  DNR/DNI, no cardioversion, no artifical nutrition and I have updated his chart to reflect those wishes. At this time, she is interested in talking with [facility]. She is leaning towards possibly taking patient home with their services. She would like to discuss further with her sons and her support system. She is aware she can inform medical team of any changes when she is ready to make them. Of note, the patient was a  at [employer] for 40 plus years and has their support as well. She was emotional during conversation and support was provided. I did offer spiritual consult and she declined. We did also discuss grief and stages. She was very thankful and appreciative of support provided. I spoke with Dr Villar and ANAHY Jacob.       Thank you for this consult and allowing us to participate in  patient's plan of care. Palliative Care Team will continue to follow patient.     Time spent: 60 minutes spent reviewing medical and medication records, assessing and examining patient, discussing with family, answering questions, providing some guidance about a plan and documentation of care, and coordinating care with other healthcare members, with > 50% time spent face to face.   .30 additional minutes spent on advance care planning, goals of care and code status discussions.     Jihan Mahmood, ALTAGRACIA  9/20/2024  10:07 EDT

## 2024-09-20 NOTE — PLAN OF CARE
Goal Outcome Evaluation:  Plan of Care Reviewed With: patient, family        Progress: declining  Outcome Evaluation: New admission to palliative unit. Pt very uncomfortable after transfer. Brow furrowed, moaning and verbalizing pain. Palliative order set obtained. Morphine 2mg administered. Pt still appeared very anxious. Nsg was to administer Ativan 1mg IV but IV site had infiltrated from pt being restless and pulling at site. IV nurse paged for new site. Education provided to pt and family about symptom management. Nsg to cont with plan of care.

## 2024-09-20 NOTE — PROGRESS NOTES
Subjective     HISTORY OF PRESENT ILLNESS:   No acute issues overnight.  Patient remains confused and drowsy.  Wife and other family numbers at bedside      Past Medical History, Past Surgical History, Social History, Family History have been reviewed and are without significant changes except as mentioned.    Review of Systems   A comprehensive 14 point review of systems was performed and was negative except as mentioned.    Medications:  The current medication list was reviewed in the EMR    ALLERGIES:  No Known Allergies    Objective      Vitals:    09/19/24 1313 09/19/24 1955 09/19/24 2320 09/20/24 0744   BP: 150/92 126/79 150/74 95/41   BP Location: Right arm Right arm Right arm Right arm   Patient Position: Lying Lying Lying Lying   Pulse: 72 88 72 65   Resp: 16   15   Temp:  97.5 °F (36.4 °C) 97.3 °F (36.3 °C) 98.1 °F (36.7 °C)   TempSrc:  Oral Oral Axillary   SpO2: 99% 92% 90% 92%   Weight:       Height:              No data to display                Physical Exam    CONSTITUTIONAL:  Vital signs reviewed.    EYES:  Conjunctiva and lids unremarkable.    EARS,NOSE,MOUTH,THROAT:  Ears and nose appear unremarkable.    RESPIRATORY:  Normal respiratory effort.  CARDIOVASCULAR:  Normal heart rate  GASTROINTESTINAL: Abdomen appears unremarkable.  Nondistended   LYMPHATIC:  No cervical, supraclavicular lymphadenopathy.  SKIN:  Warm.  No rashes.  PSYCHIATRIC: Patient is getting more confused and drowsy.      RECENT LABS:  Hematology WBC   Date Value Ref Range Status   09/20/2024 10.34 3.40 - 10.80 10*3/mm3 Final     RBC   Date Value Ref Range Status   09/20/2024 3.81 (L) 4.14 - 5.80 10*6/mm3 Final     Hemoglobin   Date Value Ref Range Status   09/20/2024 9.9 (L) 13.0 - 17.7 g/dL Final     Hematocrit   Date Value Ref Range Status   09/20/2024 32.1 (L) 37.5 - 51.0 % Final     Platelets   Date Value Ref Range Status   09/20/2024 301 140 - 450 10*3/mm3 Final              Assessment & Plan   This is a 85 y/o M with:      # Muscle invasive bladder urothelial carcinoma:  Diagnosed in June 2024.   Seen by , [facility].  He could not receive neoadjuvant chemo d/t renal function.  Underwent radical cystectomy by Dr. Pope on 8/28/2024 at [facility].  Pathology: High-grade urothelial carcinoma with squamous differentiation, pT3b Nx. apical, anterior and posterior soft tissue margins positive for malignancy.  9/18/2024: Hospitalized with failure to thrive with labs showing hypercalcemia of 12.2, MENDOZA with serum creatinine of 2.13 and leukocytosis.  CT A/P concerning for right pelvic wall disease recurrence vs complex fluid collection.  I reviewed the imaging and pathologic findings with patient and his wife in detail.  Informed that radical cystectomy can be associated with significant morbidity and mortality, especially in someone with borderline performance status.  Unfortunately he has not done well since surgery - which is not unusual in cystectomy patients  His current decline appears to be secondary to recurrent, progressive malignancy which is being reflected as hypercalcemia of malignancy and possible pyelonephritis.  If not corrected, this portends poor prognosis.  Agree with IV fluids and IV antibiotics.  Nephrology has been consulted   9/20/24: Patient has not had any significant improvement in his mental status, rather he has gotten worse.  He has become more drowsy and confused.  I do not think he will be a good candidate for any cancer directed therapy.  Reviewed goals of care with patient's wife.  Plan made to focus on comfort now.  They are interested in home with hospice.  Hospice has been consulted     # Hypercalcemia of malignancy  Corrected calcium 12.92 on presentation  Continue IV fluids as above.   S/p IV Pamidronate 60 mg x 1 dose on 9/18  Nephrology consulted     # Suspected pyelonephritis: On IV Zosyn     # AMS: Likely hypercalcemia/pyelonephritis related     # Weight loss:   Consult  dietician  9/19/2024: Patient's friend (an RN) inquired about PEG tube placement.  Informed that we need to have a goals of care discussion with patient's wife.  Due to his recurrent and progressive urothelial cancer and now altered mental status, patient has poor prognosis.  PEG tube can increase risk for aspiration.     Recommendations:  -He has become more drowsy and confused.  I do not think he will be a good candidate for any cancer directed therapy.  Reviewed goals of care with patient's wife.  Plan made to focus on comfort now.  They are interested in home with hospice.  Hospice has been consulted    Will sign off.  Please call us with any questions    I spent 52 minutes on this encounter, before, during & after the visit evaluating the patient, reviewing records and writing orders.

## 2024-09-20 NOTE — NURSING NOTE
"   09/20/24 0845   Urostomy Ileal conduit RLQ   No Placement date: If unknown, DO NOT use \"Add Comment\" note or Placement time: If unknown, DO NOT use \"Add Comment\" note found.   Present on Admission? Select all that apply: Other hospital  Urostomy Type: Ileal conduit  Location: RLQ   Stomal Appliance 2 piece;Clean;Dry;Intact;Drainable   Stoma Appearance round;moist;red;protruding above skin level   Peristomal Assessment JAIMIE     Wound/Ostomy:  Follow up Ostomy care. Existing appliance remain intact, no leaking, good seal noted. [place] 2-piece 2 1/4 used, good seal noted. Supplies left in the room. According to the wife at the bedside, the Ostomy appliance was changed yesterday.  Plan to change per WON 2 time a wk.  Please re-consult for any additional needs.  "

## 2024-09-20 NOTE — CONSULTS
"Nephrology Progress Note:     LOS: 1 day   Patient Care Team:  Shanika Lovell MD as PCP - General (Internal Medicine)  Ramila Stanley MD as Consulting Physician (Ophthalmology)  Shila Killian MD as Surgeon (General Surgery)  Mata Wright MD as Consulting Physician (Dermatology)  Gregorio Chase MA as Medical Assistant (Internal Medicine)      Subjective     Interval History:   Ask to see for MENDOZA and hypernatremia.  Chronically ill 84 year old male admitted with lethargy and confusion. Renal asked to see for hypernatremia (151) and MENDOZA on CKD 3B/4. Admission creatinine of 2.2 with baseline of 2.0. Patient poorly responsive with family at bedside helping with history. Patient apparently with poor po intake and progressive weakness.    PMHx:  NKA  CKD 3B/4  Bladder cancer with recent radical cystectomy  DM  Hypothyroid  Prostate cancer  Hyperlipidemia    Surgical:  Cystectomy  Cholecystectomy  Partial gastrectomy    SHx:  45 pk cig  No ETOH    FHx:  ASCAD  DM  Cancer      Review of Systems:   Confusion  Anorexia  Weakness  Hx per family     Objective     Vital Signs  /92 (BP Location: Right arm, Patient Position: Lying)   Pulse 72   Temp 97.5 °F (36.4 °C) (Oral)   Resp 16   Ht 177.8 cm (70\")   Wt 62.1 kg (136 lb 14.5 oz)   SpO2 99%   BMI 19.64 kg/m²     Intake/Output  I/O last 3 completed shifts:  In: 140 [P.O.:140]  Out: 1500 [Urine:1500]  No intake/output data recorded.      Physical Exam:  Chronically ill appearing  Confused  No icterus  No JVD  RRR  Lungs clear  Soft abdomen with urostomy positive bowel sounds  No C/C/E  confused      Results Review:       Imaging Results (Last 24 Hours)       ** No results found for the last 24 hours. **            Medication Review:  insulin lispro, 2-9 Units, Subcutaneous, 4x Daily AC & at Bedtime  levothyroxine, 50 mcg, Oral, Q AM  pantoprazole, 40 mg, Oral, Daily  piperacillin-tazobactam, 3.375 g, Intravenous, Q8H    Labs  Sodium 151  Creatinine " 2.2  Calcium 11.5    Assessment & Plan   MENDOZA on CKD 3B secondary to volume depletion. No indication for dialysis. Needs volume.  Hypernatremia secondary to Volume depletion  Encephalopathy  Recent bladder cancer with radical cystectomy.  Hypercalcemia adding to confusion from volume depletion and cancer.  Plans:  IV fluids with hypotonic fluids.  Discussed with family.      MENDOZA (acute kidney injury)    Type 2 diabetes mellitus with hyperglycemia, with long-term current use of insulin    Acquired hypothyroidism    Bladder cancer    Stage 3a chronic kidney disease    Intra-abdominal fluid collection    Severe malnutrition    Metabolic encephalopathy    Hypercalcemia    Hypernatremia      Chapin Garcia MD  09/19/24  22:33 EDT

## 2024-09-20 NOTE — CASE MANAGEMENT/SOCIAL WORK
Continued Stay Note  [facility]     Patient Name: Bennie Greene  MRN: [id number]  Today's Date: 9/20/2024    Admit Date: 9/17/2024    Plan: Pending SNF vs Home with Hospice   Discharge Plan       Row Name 09/20/24 1517       Plan    Plan Pending SNF vs Home with Hospice    Patient/Family in Agreement with Plan yes    Plan Comments Patient is able to still return to [facility] if that is the route family decides. Palliative saw patient today and family is interested in talking with [facility]. Spouse is leaning towards possibly taking patient home with their services. She would like to discuss further with her sons and her support system. CCP following for any discharge needs.                   Discharge Codes    No documentation.                 Expected Discharge Date and Time       Expected Discharge Date Expected Discharge Time    Sep 20, 2024

## 2024-09-20 NOTE — PLAN OF CARE
Goal Outcome Evaluation:      Pt remains confused, RA, VSS. No acute changes this shift. Spouse currently at bedside. Pt blood glucose critical at 2200, BG 47. ALTAGRACIA Gracia notified. Glucose gel PO administered per standing order. BG finger stick rechecked and lab glucose collection x1 ordered. BG finger stick 70, Lab collection . D5W infusion initiated. Wound care completed as ordered. IV Zosyn administered. Bed alarm on. Call light within reach. Plan of care ongoing.

## 2024-09-20 NOTE — PROGRESS NOTES
Name: Bennie Greene ADMIT: 2024   : 1940  PCP: Person, Shanika Kaufman MD    MRN: [id number] LOS: 2 days   AGE/SEX: 84 y.o. male  ROOM: [facility]     Subjective   Subjective   More somnolent since yesterday afternoon.  Family says not eating much.    Hypoglycemic last night       Objective   Objective   Vital Signs  Temp:  [97.3 °F (36.3 °C)-98.1 °F (36.7 °C)] 97.7 °F (36.5 °C)  Heart Rate:  [65-88] 65  Resp:  [14-15] 14  BP: ()/(41-79) 124/78  SpO2:  [90 %-96 %] 96 %  on   ;   Device (Oxygen Therapy): room air  Body mass index is 19.64 kg/m².  Physical Exam  Vitals reviewed.   Constitutional:       General: He is not in acute distress.     Appearance: He is ill-appearing.      Comments: Awake but withdrawn.  Answered questions when spoken to   Cardiovascular:      Rate and Rhythm: Normal rate and regular rhythm.   Pulmonary:      Effort: No respiratory distress.      Breath sounds: Normal breath sounds.   Abdominal:      General: There is no distension.      Palpations: Abdomen is soft.      Tenderness: There is no abdominal tenderness.   Musculoskeletal:      Right lower leg: No edema.      Left lower leg: No edema.   Skin:     General: Skin is warm and dry.      Coloration: Skin is pale.      Findings: No erythema.   Neurological:      Mental Status: He is disoriented.       Results Review     I reviewed the patient's new clinical results.  Results from last 7 days   Lab Units 24  0532 24  0410 24  0839 24  1917   WBC 10*3/mm3 10.34 11.94* 15.11* 17.27*   HEMOGLOBIN g/dL 9.9* 10.5* 11.7* 13.2   PLATELETS 10*3/mm3 301 336 350 400     Results from last 7 days   Lab Units 24  0532 24  0023 24  0410 24  0839 24  1749   SODIUM mmol/L 149*  --  151* 145 142   POTASSIUM mmol/L 3.6  --  4.0 4.6 5.5*   CHLORIDE mmol/L 124*  --  125* 116* 111*   CO2 mmol/L 18.0*  --  17.0* 15.0* 16.0*   BUN mg/dL 54*  --  63* 72* 70*   CREATININE mg/dL 1.69*  --  1.99*  2.20* 2.13*   GLUCOSE mg/dL 156* 102* 120* 150* 173*   EGFR mL/min/1.73 39.8*  --  32.7* 29.0* 30.3*     Results from last 7 days   Lab Units 09/20/24  0532 09/19/24  0410 09/17/24  1749   ALBUMIN g/dL 2.6* 2.8* 3.1*   BILIRUBIN mg/dL 0.2 0.2 0.2   ALK PHOS U/L 102 111 144*   AST (SGOT) U/L 13 18 20   ALT (SGPT) U/L 14 17 16     Results from last 7 days   Lab Units 09/20/24  0532 09/19/24  0410 09/18/24  0839 09/17/24  1749   CALCIUM mg/dL 10.4 11.0* 11.5* 12.2*   ALBUMIN g/dL 2.6* 2.8*  --  3.1*     Results from last 7 days   Lab Units 09/17/24  2224 09/17/24  1917   LACTATE mmol/L 1.3 2.0     Glucose   Date/Time Value Ref Range Status   09/20/2024 0631 162 (H) 70 - 130 mg/dL Final   09/19/2024 2316 70 70 - 130 mg/dL Final   09/19/2024 2314 66 (L) 70 - 130 mg/dL Final   09/19/2024 2245 51 (L) 70 - 130 mg/dL Final   09/19/2024 2241 51 (L) 70 - 130 mg/dL Final   09/19/2024 2200 47 (C) 70 - 130 mg/dL Final   09/19/2024 2156 51 (L) 70 - 130 mg/dL Final       No radiology results for the last day    I have personally reviewed all medications:  Scheduled Medications  levothyroxine, 50 mcg, Oral, Q AM  pantoprazole, 40 mg, Oral, Daily  piperacillin-tazobactam, 3.375 g, Intravenous, Q8H    Infusions  sodium chloride, 125 mL/hr, Last Rate: 125 mL/hr (09/19/24 1910)    Diet  Diet: Regular/House; Fluid Consistency: Thin (IDDSI 0)    I have personally reviewed:  [x]  Laboratory   [x]  Microbiology   [x]  Radiology   [x]  EKG/Telemetry  [x]  Cardiology/Vascular   []  Pathology    []  Records       Assessment/Plan     Active Hospital Problems    Diagnosis  POA    **MENDOZA (acute kidney injury) [N17.9]  Yes    Hypercalcemia [E83.52]  Unknown    Hypernatremia [E87.0]  Unknown    Severe malnutrition [E43]  Yes    Metabolic encephalopathy [G93.41]  Yes    Intra-abdominal fluid collection [R18.8]  Yes    Stage 3a chronic kidney disease [N18.31]  Yes    Bladder cancer [C67.9]  Yes    Acquired hypothyroidism [E03.9]  Yes    Type 2  diabetes mellitus with hyperglycemia, with long-term current use of insulin [E11.65, Z79.4]  Not Applicable      Resolved Hospital Problems   No resolved problems to display.       Mr. Greene is a 84 y.o. male with history of diabetes, hypothyroidism, CKD 3 and recent radical cystectomy for bladder cancer presenting with failure to thrive, weight loss and now with new onset confusion due to metabolic encephalopathy and MENDOZA     MENDOZA improved with hydration.  Orders written by nephrology but no note available.  - Hypercalcemia slightly better.  Status post pamidronate  - Hypernatremia mildly better    Still not clear if there is truly any infection.  He remains on Zosyn empirically for the fluid collection outside the kidney.  Cultures negative to date.  Urology also following    DM2 with hypoglycemia.  No nutritional stores.  Will discontinue sliding scale insulin based on below conversation.      Talked with his wife and family at bedside and with palliative NP.  They have decided to pursue comfort measures and I think this is clearly indicated.  He has not been doing well for quite some time and I think is going to continue to deteriorate.  He will not be a candidate for any further aggressive treatment even if it was desired.  [facility] has been consulted to potentially do home hospice versus scattered bed.  He can be transferred to palliative unit.  I will stop monitoring labs daily and will not escalate care further        Brando Villar MD  [facility]  09/20/24  16:00 EDT

## 2024-09-21 NOTE — PROGRESS NOTES
Name: Bennie Greene ADMIT: 2024   : 1940  PCP: Person, Shanika Kaufman MD    MRN: [id number] LOS: 3 days   AGE/SEX: 84 y.o. male  ROOM: [facility]     Subjective   Subjective   Wife at bedside, patient sleeping soundly/snoring.  No hospice eval yet       Objective   Objective   Vital Signs  Temp:  [97 °F (36.1 °C)-98.5 °F (36.9 °C)] 98.5 °F (36.9 °C)  Heart Rate:  [] 103  Resp:  [14-16] 16  BP: (115-147)/(77-80) 115/77  SpO2:  [97 %-98 %] 98 %  on   ;   Device (Oxygen Therapy): room air  Body mass index is 19.64 kg/m².  Physical Exam  Vitals reviewed.   Constitutional:       Appearance: He is ill-appearing.      Comments: Snoring   Cardiovascular:      Rate and Rhythm: Tachycardia present.   Pulmonary:      Effort: Pulmonary effort is normal.   Abdominal:      General: There is no distension.      Palpations: Abdomen is soft.      Tenderness: There is no abdominal tenderness.   Musculoskeletal:      Right lower leg: No edema.      Left lower leg: No edema.   Skin:     Coloration: Skin is pale.       Results Review     I reviewed the patient's new clinical results.  Results from last 7 days   Lab Units 24  0532 24  0410 24  0839 24  1917   WBC 10*3/mm3 10.34 11.94* 15.11* 17.27*   HEMOGLOBIN g/dL 9.9* 10.5* 11.7* 13.2   PLATELETS 10*3/mm3 301 336 350 400     Results from last 7 days   Lab Units 24  0532 24  0023 24  0410 24  0839 24  1749   SODIUM mmol/L 149*  --  151* 145 142   POTASSIUM mmol/L 3.6  --  4.0 4.6 5.5*   CHLORIDE mmol/L 124*  --  125* 116* 111*   CO2 mmol/L 18.0*  --  17.0* 15.0* 16.0*   BUN mg/dL 54*  --  63* 72* 70*   CREATININE mg/dL 1.69*  --  1.99* 2.20* 2.13*   GLUCOSE mg/dL 156* 102* 120* 150* 173*   EGFR mL/min/1.73 39.8*  --  32.7* 29.0* 30.3*     Results from last 7 days   Lab Units 24  0532 24  0410 24  1749   ALBUMIN g/dL 2.6* 2.8* 3.1*   BILIRUBIN mg/dL 0.2 0.2 0.2   ALK PHOS U/L 102 111 144*   AST  (SGOT) U/L 13 18 20   ALT (SGPT) U/L 14 17 16     Results from last 7 days   Lab Units 09/20/24  0532 09/19/24  0410 09/18/24  0839 09/17/24  1749   CALCIUM mg/dL 10.4 11.0* 11.5* 12.2*   ALBUMIN g/dL 2.6* 2.8*  --  3.1*     Results from last 7 days   Lab Units 09/17/24  2224 09/17/24  1917   LACTATE mmol/L 1.3 2.0     Glucose   Date/Time Value Ref Range Status   09/20/2024 0631 162 (H) 70 - 130 mg/dL Final   09/19/2024 2316 70 70 - 130 mg/dL Final   09/19/2024 2314 66 (L) 70 - 130 mg/dL Final   09/19/2024 2245 51 (L) 70 - 130 mg/dL Final   09/19/2024 2241 51 (L) 70 - 130 mg/dL Final   09/19/2024 2200 47 (C) 70 - 130 mg/dL Final   09/19/2024 2156 51 (L) 70 - 130 mg/dL Final       No radiology results for the last day    I have personally reviewed all medications:  Scheduled Medications  levothyroxine, 50 mcg, Oral, Q AM  pantoprazole, 40 mg, Oral, Daily  piperacillin-tazobactam, 3.375 g, Intravenous, Q8H    Infusions  sodium chloride, 125 mL/hr, Last Rate: 125 mL/hr (09/21/24 0045)    Diet  Diet: Regular/House; Fluid Consistency: Thin (IDDSI 0)    I have personally reviewed:  [x]  Laboratory   []  Microbiology   []  Radiology   []  EKG/Telemetry  []  Cardiology/Vascular   []  Pathology    []  Records       Assessment/Plan     Active Hospital Problems    Diagnosis  POA    **MENDOZA (acute kidney injury) [N17.9]  Yes    Hypercalcemia [E83.52]  Unknown    Hypernatremia [E87.0]  Unknown    Severe malnutrition [E43]  Yes    Metabolic encephalopathy [G93.41]  Yes    Intra-abdominal fluid collection [R18.8]  Yes    Stage 3a chronic kidney disease [N18.31]  Yes    Bladder cancer [C67.9]  Yes    Acquired hypothyroidism [E03.9]  Yes    Type 2 diabetes mellitus with hyperglycemia, with long-term current use of insulin [E11.65, Z79.4]  Not Applicable      Resolved Hospital Problems   No resolved problems to display.       Mr. Greene is a 84 y.o. male with history of diabetes, hypothyroidism, CKD 3 and recent radical cystectomy for  bladder cancer presenting with failure to thrive, weight loss and now with new onset confusion due to metabolic encephalopathy and MENDOZA     Patient seen on palliative care unit, stable.  Currently pretty somnolent.  He has gotten Ativan and morphine x 3 overnight.  Await hospice eval, possible scattered bed admission    Continue comfort measures and palliative order set.  Discussed care with wife at bedside who was appreciative.      Brando Villar MD  [facility]  09/21/24  14:50 EDT

## 2024-09-21 NOTE — CONSULTS
Name: Bennie Greene ADMIT: 2024   : 1940  PCP: Shanika Lovell MD    MRN: [id number] LOS: 3 days   AGE/SEX: 84 y.o. male        I arrived to unit and spoke with ANAHY Espinosa on pts updated status. Records reviewed and faxed to R. Met with spouse and 2 sons in conference room per family request. Detailed EOS provided, all questions answered, goals of care discussed. Pt in bed with eyes slightly open, no response to touch or voice. Verified eligibility and VC given by Dr. Mariya Vences, pt is GIP eligible. Family want to discuss care before making final decision with [facility] vs Home with [facility]. Will follow up remotely for changes, encouraged family and staff to call CS with any questions and/or changes.    Juliana Kaur RN  [facility]   [phone]

## 2024-09-21 NOTE — PROGRESS NOTES
Patient is seen, evaluated, and chart reviewed. Discussed with staff.      Staff reports that patient has been cooperative and compliant with medications.  Confused, however, no behavioral issues.    On examination, patient is found .to be lying in bed sleeping soundly. Niece and family friend at bedside. Wife had gone home to shower. Family has decided to pursue palliative care measures and will be meeting with [facility] later this weekend per niece.   No further treatment indicated. Will sign off.

## 2024-09-22 NOTE — PLAN OF CARE
Goal Outcome Evaluation:      Pt maintains PPS 10%. Terminal; restlessness noted and family educated on nonverbal indicators of pain and discomfort. Family remains bs; wife, Diane, will stay the night. Antibiotics continued per family request. Hospice consult today; pt is HSB eligible but the family would like more time to discuss with each other. Hospice to follow. Pt tolerating turns well. PRN: 0.5 mg ativan X2, 1mg ativan X1 and 2mg morphine X1. Family will notify staff when they feel he is in distress and needs medication. Support and education provided. Ennis catheter in place and PIV in LAC currently infusing Zosyn. Pt appears to be resting comfortably with unlabored breathing. Will continue plan of comfort.

## 2024-09-23 NOTE — PROGRESS NOTES
Name: Bennie Greene ADMIT: 2024   : 1940  PCP: Person, Shanika Kaufman MD    MRN: [id number] LOS: 4 days   AGE/SEX: 84 y.o. male  ROOM: [facility]     Subjective   Subjective   Wife and son at bedside with other family. No concerns from their standpoint       Objective   Objective   Vital Signs  Temp:  [97.6 °F (36.4 °C)-99.1 °F (37.3 °C)] 99.1 °F (37.3 °C)  Heart Rate:  [81-96] 96  Resp:  [14-16] 14  BP: ()/(52-69) 77/52  SpO2:  [94 %-99 %] 94 %  on   ;   Device (Oxygen Therapy): room air  Body mass index is 19.64 kg/m².  Physical Exam  Vitals reviewed.   Constitutional:       Appearance: He is ill-appearing.      Comments: Snoring   Cardiovascular:      Rate and Rhythm: Tachycardia present.   Pulmonary:      Effort: Pulmonary effort is normal.   Abdominal:      General: There is no distension.      Palpations: Abdomen is soft.      Tenderness: There is no abdominal tenderness.   Musculoskeletal:      Right lower leg: No edema.      Left lower leg: No edema.   Skin:     Coloration: Skin is pale.       Results Review     I reviewed the patient's new clinical results.  Results from last 7 days   Lab Units 24  0532 24  0410 24  0839 24  1917   WBC 10*3/mm3 10.34 11.94* 15.11* 17.27*   HEMOGLOBIN g/dL 9.9* 10.5* 11.7* 13.2   PLATELETS 10*3/mm3 301 336 350 400     Results from last 7 days   Lab Units 24  0532 24  0023 24  0410 24  0839 24  1749   SODIUM mmol/L 149*  --  151* 145 142   POTASSIUM mmol/L 3.6  --  4.0 4.6 5.5*   CHLORIDE mmol/L 124*  --  125* 116* 111*   CO2 mmol/L 18.0*  --  17.0* 15.0* 16.0*   BUN mg/dL 54*  --  63* 72* 70*   CREATININE mg/dL 1.69*  --  1.99* 2.20* 2.13*   GLUCOSE mg/dL 156* 102* 120* 150* 173*   EGFR mL/min/1.73 39.8*  --  32.7* 29.0* 30.3*     Results from last 7 days   Lab Units 24  0532 24  0410 24  1749   ALBUMIN g/dL 2.6* 2.8* 3.1*   BILIRUBIN mg/dL 0.2 0.2 0.2   ALK PHOS U/L 102 111 144*   AST  (SGOT) U/L 13 18 20   ALT (SGPT) U/L 14 17 16     Results from last 7 days   Lab Units 09/20/24  0532 09/19/24  0410 09/18/24  0839 09/17/24  1749   CALCIUM mg/dL 10.4 11.0* 11.5* 12.2*   ALBUMIN g/dL 2.6* 2.8*  --  3.1*     Results from last 7 days   Lab Units 09/17/24  2224 09/17/24  1917   LACTATE mmol/L 1.3 2.0     Glucose   Date/Time Value Ref Range Status   09/20/2024 0631 162 (H) 70 - 130 mg/dL Final   09/19/2024 2316 70 70 - 130 mg/dL Final   09/19/2024 2314 66 (L) 70 - 130 mg/dL Final   09/19/2024 2245 51 (L) 70 - 130 mg/dL Final   09/19/2024 2241 51 (L) 70 - 130 mg/dL Final   09/19/2024 2200 47 (C) 70 - 130 mg/dL Final   09/19/2024 2156 51 (L) 70 - 130 mg/dL Final       No radiology results for the last day    I have personally reviewed all medications:  Scheduled Medications  levothyroxine, 50 mcg, Oral, Q AM  pantoprazole, 40 mg, Oral, Daily    Infusions  sodium chloride, 125 mL/hr, Last Rate: 125 mL/hr (09/21/24 0045)    Diet  Diet: Regular/House; Fluid Consistency: Thin (IDDSI 0)    I have personally reviewed:  []  Laboratory   []  Microbiology   []  Radiology   []  EKG/Telemetry  []  Cardiology/Vascular   []  Pathology    []  Records       Assessment/Plan     Active Hospital Problems    Diagnosis  POA    **MENDOZA (acute kidney injury) [N17.9]  Yes    Hypercalcemia [E83.52]  Unknown    Hypernatremia [E87.0]  Unknown    Severe malnutrition [E43]  Yes    Metabolic encephalopathy [G93.41]  Yes    Intra-abdominal fluid collection [R18.8]  Yes    Stage 3a chronic kidney disease [N18.31]  Yes    Bladder cancer [C67.9]  Yes    Acquired hypothyroidism [E03.9]  Yes    Type 2 diabetes mellitus with hyperglycemia, with long-term current use of insulin [E11.65, Z79.4]  Not Applicable      Resolved Hospital Problems   No resolved problems to display.       Mr. Greene is a 84 y.o. male with history of diabetes, hypothyroidism, CKD 3 and recent radical cystectomy for bladder cancer presenting with failure to thrive,  weight loss and now with new onset confusion due to metabolic encephalopathy and MENDOZA     Patient continues in dying process. Wife and family met with [facility] and apparently wanted to think over options. She tells me they have decided to stay at [facility] for scattered bed care. Staff was to reach out to [facility] again this afternoon but apparently consents were not obtained.  Morphine 2mg IV x 4, Ativan 0.5 mg IV x1 and Ativan 1mg IV x3 since yesterday.    Continue comfort measures and palliative order set.  Updated family and answered a few questions. Anticipate prognosis hours to days.      Brando Villar MD  [facility]  09/22/24  21:31 EDT

## 2024-09-23 NOTE — PROGRESS NOTES
Case Management Discharge Note      Final Note: The patient  on 24. KAREEM Alex Rn, CCP.    Provided Post Acute Provider List?: Yes  Post Acute Provider List: Nursing Home  Delivered To: Support Person  Method of Delivery: Telephone    Selected Continued Care - Discharged on 2024 Admission date: 2024 - Discharge disposition:       Destination Coordination complete.      Service Provider Selected Services Address Phone Fax Patient Preferred    [facility] [place] [id number] [phone] --              Durable Medical Equipment    No services have been selected for the patient.                Dialysis/Infusion    No services have been selected for the patient.                Home Medical Care    No services have been selected for the patient.                Therapy    No services have been selected for the patient.                Community Resources    No services have been selected for the patient.                Community & DME    No services have been selected for the patient.                    Selected Continued Care - Episodes Includes continued care and service providers with selected services from the active episodes listed below      [facility] Episode start date: 2022   There are no active outsourced providers for this episode.                      Final Discharge Disposition Code: 20 -

## 2024-09-23 NOTE — PROGRESS NOTES
Discharge Planning Assessment  [facility]     Patient Name: Bennie Greene  MRN: [id number]  Today's Date: 2024    Admit Date: 2024    Plan: Pending SNF vs Home with Hospice   Discharge Needs Assessment    No documentation.                  Discharge Plan       Row Name 24 1632       Plan    Plan Comments The patient transferred to [facility] from [address] [place] [address] on 24. The patient was palliative and [facility] was to evaluate. KAREEM jara Rn, CCP.    Final Discharge Disposition Code 20 -     Final Note The patient  on 24. KAREEM Jara Rn, CCP.                  Continued Care and Services - Discharged on 2024 Admission date: 2024 - Discharge disposition:       Destination Coordination complete.      Service Provider Request Status Selected Services Address Phone Fax Patient Preferred    [facility] [address] [place] [id number] [phone] --                  Selected Continued Care - Episodes Includes continued care and service providers with selected services from the active episodes listed below      [facility] Episode start date: 2022   There are no active outsourced providers for this episode.                 Expected Discharge Date and Time       Expected Discharge Date Expected Discharge Time    Sep 23, 2024            Demographic Summary    No documentation.                  Functional Status    No documentation.                  Psychosocial    No documentation.                  Abuse/Neglect    No documentation.                  Legal    No documentation.                  Substance Abuse    No documentation.                  Patient Forms    No documentation.                     Monica Jara RN

## 2024-10-01 NOTE — DISCHARGE SUMMARY
Name: Bennie Greene  :  1940  MRN: [id number]         Primary Care Physician: Shanika Lovell MD      Date of Admission:  2024  Date and Time of Death:  2024 at 12:06 AM    Principle Cause of Death:   Bladder cancer    Secondary Diagnoses:     MENDOZA (acute kidney injury)    Type 2 diabetes mellitus with hyperglycemia, with long-term current use of insulin    Acquired hypothyroidism    Bladder cancer    Stage 3a chronic kidney disease    Intra-abdominal fluid collection    Severe malnutrition    Metabolic encephalopathy    Hypercalcemia    Hypernatremia        Hospital Course  Patient was a 84 y.o. male who presented to [facility] with diabetes, hypothyroidism, bladder cancer status post recent radical cystectomy at another facility who presented with confusion and lack of appetite.  Please see H&P for full details.  He was significantly dehydrated with acute kidney injury and also had hypercalcemia which was likely driving the dehydration.  He had unspecified fluid collection along the right pelvic sidewall.  Urology saw him and did not feel there was an abscess but recommended empiric antibiotics.  No surgical invention was recommended.  He was started on IV hydration and also given some pamidronate for hypercalcemia.  His electrolytes did improve somewhat with this intervention.  However his mentation really did not improve.  Goals of care discussions were continued throughout the hospital stay and ultimately it was clear that he had deteriorated significantly over the months even preceding the surgery and had not been able to recover from the surgery itself and likely had very poor prognosis due to his poor overall performance status.  Family elected for comfort measures and patient was transferred to the palliative care unit where comfort order set was initiated and he subsequently .      Brando Villar MD  10/01/24  08:31 EDT

## 2024-10-01 NOTE — PROGRESS NOTES
"Enter Query Response Below      Query Response:   Pressure injury to coccyx, unstageable, and right lateral ankle, stage 2, both present on admit             If applicable, please update the problem list.   Patient: Bennie Greene        : 1940  Account: [id number]           Admit Date: 2024        How to Respond to this query:       a. Click New Note     b. Answer query within the yellow box.                c. Update the Problem List, if applicable.    If you have any questions about this query contact me at: [email or web address]     Dr. Villar,     85 yo male admitted per H&P dated 24 for \"yamilet and metabolic encephalopathy.\"  Risk Factors: recent radical cystectomy for bladder cancer, PMH includes CKD 3 and DM.  Clinical indicators: Wound care nurse consulted, 24 note \"coccyx pressure injury, unstageable. Right lateral ankle pressure injury, stage 2.\"  Treatment: per WOCN note therahoney/opticel dressing and optifoam gentle dressing applied to coccyx and opticel dressing applied to right ankle. Also requested specialty mattress, Centrella Pro +, off-load heels, and repositioning every 2 hours.    Please clarify if the patient was treated/monitored for:    Pressure injury to coccyx, unstageable, and right lateral ankle, stage 2, both present on admit  Other- specify______    By submitting this query, we are merely seeking further clarification of documentation to accurately reflect all conditions that you are monitoring, evaluating, treating or that extend the hospitalization or utilize additional resources of care. Please utilize your independent clinical judgment when addressing the question(s) above.     This query and your response, once completed, will be entered into the legal medical record.    Sincerely,  Lyssa TRINIDAD RN, CCDS  System Director Clinical Documentation Integrity Program     "

## 2024-10-01 NOTE — PROGRESS NOTES
"Enter Query Response Below      Query Response: MENDOZA was not related to recent cystectomy.  Likely due to prerenal causes/dehydration due to hypercalcemia             If applicable, please update the problem list.   Patient: Bennie Greene        : 1940  Account: [id number]           Admit Date: 2024        How to Respond to this query:       a. Click New Note     b. Answer query within the yellow box.                c. Update the Problem List, if applicable.    If you have any questions about this query contact me at: [email or web address]     Dr. Villar,     83 yo male admitted per H&P dated 24 for \"mendoza and metabolic encephalopathy.\"  Risk Factors: recent radical cystectomy for bladder cancer, PMH includes CKD 3 and DM.  Clinical indicators: Urology consulted, 24 \"possible pyelo. and intra-abdominal fluid collection.\"  Oncology consulted. 24 \"His current decline appears to be secondary to recurrent, progressive malignancy which is being reflected as hypercalcemia of malignancy and possible pyelonephritis.\"  Urine culture not resulted during this admission.  Treatment: IV bolus ns 1 liter 24, IV Zosyn, Urology and Oncology consults.    Please clarify if patient treated/monitored for one or more of the following:    MENDOZA due to recent cystectomy  MENDOZA due to underlying medical condition, (please list)______________  Other- specify ______    By submitting this query, we are merely seeking further clarification of documentation to accurately reflect all conditions that you are monitoring, evaluating, treating or that extend the hospitalization or utilize additional resources of care. Please utilize your independent clinical judgment when addressing the question(s) above.     This query and your response, once completed, will be entered into the legal medical record.    Sincerely,  Lyssa TRINIDAD RN CCDS  System Director Clinical Documentation Integrity Program     "

## (undated) DEVICE — LOU CYSTO: Brand: MEDLINE INDUSTRIES, INC.

## (undated) DEVICE — CATH URETRL FLXITP POLLACK STD 5F 70CM

## (undated) DEVICE — GLV SURG BIOGEL LTX PF 7

## (undated) DEVICE — NITINOL WIRE WITH HYDROPHILIC TIP: Brand: SENSOR

## (undated) DEVICE — TIDISHIELD UROLOGY DRAIN BAGS FROSTY VINYL STERILE FITS SIEMENS UROSKOP ACCESS 20 PER CASE: Brand: TIDISHIELD